# Patient Record
Sex: FEMALE | Race: BLACK OR AFRICAN AMERICAN | NOT HISPANIC OR LATINO | Employment: OTHER | RURAL
[De-identification: names, ages, dates, MRNs, and addresses within clinical notes are randomized per-mention and may not be internally consistent; named-entity substitution may affect disease eponyms.]

---

## 2017-08-16 ENCOUNTER — OFFICE VISIT (OUTPATIENT)
Dept: FAMILY MEDICINE CLINIC | Facility: CLINIC | Age: 62
End: 2017-08-16

## 2017-08-16 VITALS
RESPIRATION RATE: 16 BRPM | WEIGHT: 276.2 LBS | TEMPERATURE: 99.2 F | HEART RATE: 92 BPM | BODY MASS INDEX: 41.86 KG/M2 | SYSTOLIC BLOOD PRESSURE: 160 MMHG | DIASTOLIC BLOOD PRESSURE: 98 MMHG | HEIGHT: 68 IN

## 2017-08-16 DIAGNOSIS — Z12.31 ENCOUNTER FOR SCREENING MAMMOGRAM FOR MALIGNANT NEOPLASM OF BREAST: ICD-10-CM

## 2017-08-16 DIAGNOSIS — Z13.1 ENCOUNTER FOR SCREENING FOR DIABETES MELLITUS: ICD-10-CM

## 2017-08-16 DIAGNOSIS — Z13.6 ENCOUNTER FOR SCREENING FOR CARDIOVASCULAR DISORDERS: ICD-10-CM

## 2017-08-16 DIAGNOSIS — Z71.6 TOBACCO ABUSE COUNSELING: ICD-10-CM

## 2017-08-16 DIAGNOSIS — Z13.29 ENCOUNTER FOR SCREENING FOR ENDOCRINE DISORDER: ICD-10-CM

## 2017-08-16 DIAGNOSIS — Z11.59 NEED FOR HEPATITIS C SCREENING TEST: ICD-10-CM

## 2017-08-16 DIAGNOSIS — Z12.11 ENCOUNTER FOR SCREENING FOR MALIGNANT NEOPLASM OF COLON: ICD-10-CM

## 2017-08-16 DIAGNOSIS — Z01.419 ENCOUNTER FOR GYNECOLOGICAL EXAMINATION: Primary | ICD-10-CM

## 2017-08-16 DIAGNOSIS — Z87.898 HISTORY OF BRAIN TUMOR: ICD-10-CM

## 2017-08-16 DIAGNOSIS — Z72.0 TOBACCO USER: ICD-10-CM

## 2017-08-16 PROCEDURE — 99204 OFFICE O/P NEW MOD 45 MIN: CPT | Performed by: FAMILY MEDICINE

## 2017-08-16 RX ORDER — ASPIRIN 81 MG/1
81 TABLET ORAL DAILY
Qty: 30 TABLET | Refills: 11
Start: 2017-08-16 | End: 2017-10-26 | Stop reason: SDUPTHER

## 2017-08-16 RX ORDER — AMLODIPINE BESYLATE 5 MG/1
5 TABLET ORAL DAILY
Qty: 30 TABLET | Refills: 11 | Status: SHIPPED | OUTPATIENT
Start: 2017-08-16 | End: 2017-10-26 | Stop reason: SDUPTHER

## 2017-08-16 RX ORDER — LISINOPRIL AND HYDROCHLOROTHIAZIDE 25; 20 MG/1; MG/1
20-25 TABLET ORAL 2 TIMES DAILY
Refills: 0 | COMMUNITY
Start: 2017-07-12 | End: 2017-10-26 | Stop reason: SDUPTHER

## 2017-08-16 RX ORDER — NICOTINE 21 MG/24HR
1 PATCH, TRANSDERMAL 24 HOURS TRANSDERMAL EVERY 24 HOURS
Qty: 30 PATCH | Refills: 11 | Status: SHIPPED | OUTPATIENT
Start: 2017-08-16 | End: 2017-10-26 | Stop reason: SDUPTHER

## 2017-08-16 NOTE — PROGRESS NOTES
Subjective   Nathaly Licea is a 61 y.o. female.     History of Present Illness     Problem List  1. Essential Hypertension   2. Morbid Obesity   3. H/O Brain Tumor  4. GERD  5. Constipation  6. Chronic back pain, DDD of spine    PSHX  1. H/O Brain Tumors sp Surgical Removal 2017 at [facility] [place] [facility]   2. Right ankle surgery in   3. Emergency C section 40 years ago     SocialHx  -tobacco smoker.  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 Is here to establish.  Pt was recently at [facility] and had brain surgery in July to remove 2 brain tumors. Pt states that around July she started experiencing double vision, headaches. Went to [facility] and CT scan done showed the brain tumors and pt was transferred to [facility]. Her Neurosurgeon is Dr. Greenfield. Do not have records here currently.  Pt was on dexamethasone for about 5 days and finished course. She continues to have headaches and double vision. Her next appt with Neurosurgeon is in 3 months. I am told today the tumors were not malignant. Pt does now know what type of tumors they are.  Pt currently wears an eye patch on her left eye.  Pt currently takes lisinopril-HCTZ 20-25 mg PO BID. Does eat a high salt diet mainly consisting of proceed foods and fast food.  BMI >30.  Does have family history of MI, heart disease and diabetes in family. She has not had recent labwork.  She does smoke tobacco and has cut down to 1 cigarette a day from half a pack a day.  She has been smoking for >10 years.  Pt also has chronic back pain and was seeing Pain Management for this in the past.  Other medications pt  "is taking is aspirin and was taking omeprazole for GERD but not currently.      Pt is due for mammogram, pap smear and colonoscopy screening.  She has had history of cervical procedures in the past but could not remember which one.  She also has had abnormal pap smears      The following portions of the patient's history were reviewed and updated as appropriate: allergies, current medications, past family history, past medical history, past social history, past surgical history and problem list.    Review of Systems   Constitutional: Negative.    Eyes: Positive for visual disturbance.   Respiratory: Negative.    Cardiovascular: Negative.    Gastrointestinal: Negative.    Endocrine: Negative.    Genitourinary: Negative.    Musculoskeletal: Negative.    Skin: Negative.    Allergic/Immunologic: Negative.    Neurological: Positive for headaches.   Hematological: Negative.    Psychiatric/Behavioral: Negative.        Objective  /98  Pulse 92  Temp 99.2 °F (37.3 °C)  Resp 16  Ht 67.5\" (171.5 cm)  Wt 276 lb 3.2 oz (125 kg)  BMI 42.62 kg/m2    /98  Pulse 92  Temp 99.2 °F (37.3 °C)  Resp 16  Ht 67.5\" (171.5 cm)  Wt 276 lb 3.2 oz (125 kg)  BMI 42.62 kg/m2    /98  Pulse 92  Temp 99.2 °F (37.3 °C)  Resp 16  Ht 67.5\" (171.5 cm)  Wt 276 lb 3.2 oz (125 kg)  BMI 42.62 kg/m2   No results found for any previous visit.      Physical Exam   Constitutional: She is oriented to person, place, and time. She appears well-developed and well-nourished. No distress.   HENT:   Head: Normocephalic and atraumatic.   Right Ear: External ear normal.   Left Ear: External ear normal.   Eyes: Conjunctivae and EOM are normal. Pupils are equal, round, and reactive to light. Right eye exhibits no discharge. Left eye exhibits no discharge. No scleral icterus.   Neck: Normal range of motion. Neck supple. No JVD present. No tracheal deviation present. No thyromegaly present.   Cardiovascular: Normal rate, regular rhythm " and normal heart sounds.    Pulmonary/Chest: Effort normal. No stridor. No respiratory distress. She has no wheezes.   Abdominal: Soft. She exhibits no distension and no mass. There is no tenderness. There is no rebound and no guarding. No hernia.   Obese abdomen    Musculoskeletal: Normal range of motion. She exhibits no edema, tenderness or deformity.   Lymphadenopathy:     She has no cervical adenopathy.   Neurological: She is alert and oriented to person, place, and time. She has normal reflexes. No cranial nerve deficit. Coordination normal.   Skin: Skin is warm and dry. No rash noted. She is not diaphoretic. No erythema. No pallor.   Psychiatric: She has a normal mood and affect. Her behavior is normal.   Nursing note and vitals reviewed.      Assessment/Plan   Problems Addressed this Visit        Other    Encounter for screening for diabetes mellitus    Relevant Orders    CBC Auto Differential    Comprehensive Metabolic Panel    Hemoglobin A1c    Lipid Panel    Hepatitis Panel, Acute    TSH    T4, Free    T3, Free    Vitamin D 25 Hydroxy    Insulin, Random    Encounter for screening mammogram for malignant neoplasm of breast    Relevant Orders    Mammo Screening Bilateral With CAD    Encounter for screening for malignant neoplasm of colon    Relevant Orders    Ambulatory Referral to Gastroenterology    Encounter for gynecological examination - Primary    Relevant Orders    Ambulatory Referral to Obstetrics / Gynecology    History of brain tumor    Encounter for screening for endocrine disorder    Encounter for screening for cardiovascular disorders    Tobacco user    Need for hepatitis C screening test        - mammogram screening at [facility]  - Will refer to SIGRID Healy/Dr. Cary for colonoscopy screening. Consultation appreciated  - will get thyroid studies, hga1c and lipid panel for endocrine, diabetes and cardiovascular disorder screening  - will refer to DAREK Parra for pap smear and abnormal  history of pap smears  - Morbid obesity - DASH diet information given.   Discuss weight loss options on next visit.  Will get insulin and vitamin D levels   - History of brain tumor - will need records from [place].  Pt to sign medical release today  - Hep C  Screening - hepatitis panel  - Tobacco user - counseled for 5 minutes.  Will write nicotine patch. 1 800 QUIT Now  - recheck in 2 weeks to go over labwork

## 2017-08-16 NOTE — PATIENT INSTRUCTIONS
Get labwork downstairs fasting  Take new blood pressure medication    REcord blood pressures and bring on next visit    Recheck in 2 weeks     1 800 QUIT NOW    Amlodipine tablets  What is this medicine?  AMLODIPINE (am YO kim) is a calcium-channel blocker. It affects the amount of calcium found in your heart and muscle cells. This relaxes your blood vessels, which can reduce the amount of work the heart has to do. This medicine is used to lower high blood pressure. It is also used to prevent chest pain.  This medicine may be used for other purposes; ask your health care provider or pharmacist if you have questions.  COMMON BRAND NAME(S): Norvasc  What should I tell my health care provider before I take this medicine?  They need to know if you have any of these conditions:  -heart problems like heart failure or aortic stenosis  -liver disease  -an unusual or allergic reaction to amlodipine, other medicines, foods, dyes, or preservatives  -pregnant or trying to get pregnant  -breast-feeding  How should I use this medicine?  Take this medicine by mouth with a glass of water. Follow the directions on the prescription label. Take your medicine at regular intervals. Do not take more medicine than directed.  Talk to your pediatrician regarding the use of this medicine in children. Special care may be needed. This medicine has been used in children as young as 6.  Persons over 65 years old may have a stronger reaction to this medicine and need smaller doses.  Overdosage: If you think you have taken too much of this medicine contact a poison control center or emergency room at once.  NOTE: This medicine is only for you. Do not share this medicine with others.  What if I miss a dose?  If you miss a dose, take it as soon as you can. If it is almost time for your next dose, take only that dose. Do not take double or extra doses.  What may interact with this medicine?  -herbal or dietary supplements  -local or general  anesthetics  -medicines for high blood pressure  -medicines for prostate problems  -rifampin  This list may not describe all possible interactions. Give your health care provider a list of all the medicines, herbs, non-prescription drugs, or dietary supplements you use. Also tell them if you smoke, drink alcohol, or use illegal drugs. Some items may interact with your medicine.  What should I watch for while using this medicine?  Visit your doctor or health care professional for regular check ups. Check your blood pressure and pulse rate regularly. Ask your health care professional what your blood pressure and pulse rate should be, and when you should contact him or her.  This medicine may make you feel confused, dizzy or lightheaded. Do not drive, use machinery, or do anything that needs mental alertness until you know how this medicine affects you. To reduce the risk of dizzy or fainting spells, do not sit or stand up quickly, especially if you are an older patient. Avoid alcoholic drinks; they can make you more dizzy.  Do not suddenly stop taking amlodipine. Ask your doctor or health care professional how you can gradually reduce the dose.  What side effects may I notice from receiving this medicine?  Side effects that you should report to your doctor or health care professional as soon as possible:  -allergic reactions like skin rash, itching or hives, swelling of the face, lips, or tongue  -breathing problems  -changes in vision or hearing  -chest pain  -fast, irregular heartbeat  -swelling of legs or ankles  Side effects that usually do not require medical attention (report to your doctor or health care professional if they continue or are bothersome):  -dry mouth  -facial flushing  -nausea, vomiting  -stomach gas, pain  -tired, weak  -trouble sleeping  This list may not describe all possible side effects. Call your doctor for medical advice about side effects. You may report side effects to FDA at  "1-800-FDA-1088.  Where should I keep my medicine?  Keep out of the reach of children.  Store at room temperature between 59 and 86 degrees F (15 and 30 degrees C). Protect from light. Keep container tightly closed. Throw away any unused medicine after the expiration date.  NOTE: This sheet is a summary. It may not cover all possible information. If you have questions about this medicine, talk to your doctor, pharmacist, or health care provider.     © 2017, [employer]vier/[employer] Standard. (2013-11-15 11:40:58)  DASH Eating Plan  DASH stands for \"Dietary Approaches to Stop Hypertension.\" The DASH eating plan is a healthy eating plan that has been shown to reduce high blood pressure (hypertension). Additional health benefits may include reducing the risk of type 2 diabetes mellitus, heart disease, and stroke. The DASH eating plan may also help with weight loss.  WHAT DO I NEED TO KNOW ABOUT THE DASH EATING PLAN?  For the DASH eating plan, you will follow these general guidelines:  · Choose foods with less than 150 milligrams of sodium per serving (as listed on the food label).  · Use salt-free seasonings or herbs instead of table salt or sea salt.  · Check with your health care provider or pharmacist before using salt substitutes.  · Eat lower-sodium products. These are often labeled as \"low-sodium\" or \"no salt added.\"  · Eat fresh foods. Avoid eating a lot of canned foods.  · Eat more vegetables, fruits, and low-fat dairy products.  · Choose whole grains. Look for the word \"whole\" as the first word in the ingredient list.  · Choose fish and skinless chicken or turkey more often than red meat. Limit fish, poultry, and meat to 6 oz (170 g) each day.  · Limit sweets, desserts, sugars, and sugary drinks.  · Choose heart-healthy fats.  · Eat more home-cooked food and less restaurant, buffet, and fast food.  · Limit fried foods.  · Do not childress foods. Cook foods using methods such as baking, boiling, grilling, and broiling " instead.  · When eating at a restaurant, ask that your food be prepared with less salt, or no salt if possible.  WHAT FOODS CAN I EAT?  Seek help from a dietitian for individual calorie needs.  Grains  Whole grain or whole wheat bread. Brown rice. Whole grain or whole wheat pasta. Quinoa, bulgur, and whole grain cereals. Low-sodium cereals. Corn or whole wheat flour tortillas. Whole grain cornbread. Whole grain crackers. Low-sodium crackers.  Vegetables  Fresh or frozen vegetables (raw, steamed, roasted, or grilled). Low-sodium or reduced-sodium tomato and vegetable juices. Low-sodium or reduced-sodium tomato sauce and paste. Low-sodium or reduced-sodium canned vegetables.   Fruits  All fresh, canned (in natural juice), or frozen fruits.  Meat and Other Protein Products  Ground beef (85% or leaner), grass-fed beef, or beef trimmed of fat. Skinless chicken or turkey. Ground chicken or turkey. Pork trimmed of fat. All fish and seafood. Eggs. Dried beans, peas, or lentils. Unsalted nuts and seeds. Unsalted canned beans.  Dairy  Low-fat dairy products, such as skim or 1% milk, 2% or reduced-fat cheeses, low-fat ricotta or cottage cheese, or plain low-fat yogurt. Low-sodium or reduced-sodium cheeses.  Fats and Oils  Tub margarines without trans fats. Light or reduced-fat mayonnaise and salad dressings (reduced sodium). Avocado. Safflower, olive, or canola oils. Natural peanut or almond butter.  Other  Unsalted popcorn and pretzels.  The items listed above may not be a complete list of recommended foods or beverages. Contact your dietitian for more options.  WHAT FOODS ARE NOT RECOMMENDED?  Grains  White bread. White pasta. White rice. Refined cornbread. Bagels and croissants. Crackers that contain trans fat.  Vegetables  Creamed or fried vegetables. Vegetables in a cheese sauce. Regular canned vegetables. Regular canned tomato sauce and paste. Regular tomato and vegetable juices.  Fruits  Canned fruit in light or heavy  syrup. Fruit juice.  Meat and Other Protein Products  Fatty cuts of meat. Ribs, chicken wings, paz, sausage, bologna, salami, chitterlings, fatback, hot dogs, bratwurst, and packaged luncheon meats. Salted nuts and seeds. Canned beans with salt.  Dairy  Whole or 2% milk, cream, half-and-half, and cream cheese. Whole-fat or sweetened yogurt. Full-fat cheeses or blue cheese. Nondairy creamers and whipped toppings. Processed cheese, cheese spreads, or cheese curds.  Condiments  Onion and garlic salt, seasoned salt, table salt, and sea salt. Canned and packaged gravies. Worcestershire sauce. Tartar sauce. Barbecue sauce. Teriyaki sauce. Soy sauce, including reduced sodium. Steak sauce. Fish sauce. Oyster sauce. Cocktail sauce. Horseradish. Ketchup and mustard. Meat flavorings and tenderizers. Bouillon cubes. Hot sauce. Tabasco sauce. Marinades. Taco seasonings. Relishes.  Fats and Oils  Butter, stick margarine, lard, shortening, ghee, and paz fat. Coconut, palm kernel, or palm oils. Regular salad dressings.  Other  Pickles and olives. Salted popcorn and pretzels.  The items listed above may not be a complete list of foods and beverages to avoid. Contact your dietitian for more information.  WHERE CAN I FIND MORE INFORMATION?  [employer], [employer] [place]: www.nhlbi.nih.gov/health/health-topics/topics/dash/     This information is not intended to replace advice given to you by your health care provider. Make sure you discuss any questions you have with your health care provider.     Document Released: 12/06/2012 Document Revised: 04/10/2017 Document Reviewed: 10/22/2014  [employer] Interactive Patient Education ©2017 [employer]

## 2017-08-18 LAB
25(OH)D3 SERPL-MCNC: <12.8 NG/ML (ref 30–100)
ALBUMIN SERPL-MCNC: 4.3 G/DL (ref 3.4–4.8)
ALBUMIN/GLOB SERPL: 1 G/DL (ref 1.1–1.8)
ALP SERPL-CCNC: 111 U/L (ref 38–126)
ALT SERPL W P-5'-P-CCNC: 24 U/L (ref 9–52)
ANION GAP SERPL CALCULATED.3IONS-SCNC: 12 MMOL/L (ref 5–15)
ARTICHOKE IGE QN: 114 MG/DL (ref 1–129)
AST SERPL-CCNC: 13 U/L (ref 14–36)
BASOPHILS # BLD AUTO: 0.02 10*3/MM3 (ref 0–0.2)
BASOPHILS NFR BLD AUTO: 0.3 % (ref 0–2)
BILIRUB SERPL-MCNC: 0.4 MG/DL (ref 0.2–1.3)
BUN BLD-MCNC: 15 MG/DL (ref 7–21)
BUN/CREAT SERPL: 13.9 (ref 7–25)
CALCIUM SPEC-SCNC: 10 MG/DL (ref 8.4–10.2)
CHLORIDE SERPL-SCNC: 103 MMOL/L (ref 95–110)
CHOLEST SERPL-MCNC: 248 MG/DL (ref 0–199)
CO2 SERPL-SCNC: 25 MMOL/L (ref 22–31)
CREAT BLD-MCNC: 1.08 MG/DL (ref 0.5–1)
DEPRECATED RDW RBC AUTO: 47 FL (ref 36.4–46.3)
EOSINOPHIL # BLD AUTO: 0.18 10*3/MM3 (ref 0–0.7)
EOSINOPHIL NFR BLD AUTO: 2.8 % (ref 0–7)
ERYTHROCYTE [DISTWIDTH] IN BLOOD BY AUTOMATED COUNT: 16.4 % (ref 11.5–14.5)
GFR SERPL CREATININE-BSD FRML MDRD: 63 ML/MIN/1.73 (ref 51–120)
GLOBULIN UR ELPH-MCNC: 4.5 GM/DL (ref 2.3–3.5)
GLUCOSE BLD-MCNC: 107 MG/DL (ref 60–100)
HAV IGM SERPL QL IA: NEGATIVE
HBV CORE IGM SERPL QL IA: NEGATIVE
HBV SURFACE AG SERPL QL IA: NEGATIVE
HCT VFR BLD AUTO: 34 % (ref 35–45)
HCV AB SER DONR QL: REACTIVE
HDLC SERPL-MCNC: 79 MG/DL (ref 60–200)
HGB BLD-MCNC: 11.6 G/DL (ref 12–15.5)
IMM GRANULOCYTES # BLD: 0.01 10*3/MM3 (ref 0–0.02)
IMM GRANULOCYTES NFR BLD: 0.2 % (ref 0–0.5)
LDLC/HDLC SERPL: 1.75 {RATIO} (ref 0–3.22)
LYMPHOCYTES # BLD AUTO: 2.73 10*3/MM3 (ref 0.6–4.2)
LYMPHOCYTES NFR BLD AUTO: 42.3 % (ref 10–50)
MCH RBC QN AUTO: 26.8 PG (ref 26.5–34)
MCHC RBC AUTO-ENTMCNC: 34.1 G/DL (ref 31.4–36)
MCV RBC AUTO: 78.5 FL (ref 80–98)
MONOCYTES # BLD AUTO: 0.48 10*3/MM3 (ref 0–0.9)
MONOCYTES NFR BLD AUTO: 7.4 % (ref 0–12)
NEUTROPHILS # BLD AUTO: 3.03 10*3/MM3 (ref 2–8.6)
NEUTROPHILS NFR BLD AUTO: 47 % (ref 37–80)
PLATELET # BLD AUTO: 217 10*3/MM3 (ref 150–450)
PMV BLD AUTO: 10.5 FL (ref 8–12)
POTASSIUM BLD-SCNC: 3.8 MMOL/L (ref 3.5–5.1)
PROT SERPL-MCNC: 8.8 G/DL (ref 6.3–8.6)
RBC # BLD AUTO: 4.33 10*6/MM3 (ref 3.77–5.16)
SODIUM BLD-SCNC: 140 MMOL/L (ref 137–145)
T4 FREE SERPL-MCNC: 1.09 NG/DL (ref 0.78–2.19)
TRIGL SERPL-MCNC: 153 MG/DL (ref 20–199)
TSH SERPL DL<=0.05 MIU/L-ACNC: 0.17 MIU/ML (ref 0.46–4.68)
WBC NRBC COR # BLD: 6.45 10*3/MM3 (ref 3.2–9.8)

## 2017-08-18 PROCEDURE — 83021 HEMOGLOBIN CHROMOTOGRAPHY: CPT | Performed by: FAMILY MEDICINE

## 2017-08-18 PROCEDURE — 84439 ASSAY OF FREE THYROXINE: CPT | Performed by: FAMILY MEDICINE

## 2017-08-18 PROCEDURE — 83036 HEMOGLOBIN GLYCOSYLATED A1C: CPT | Performed by: FAMILY MEDICINE

## 2017-08-18 PROCEDURE — 85025 COMPLETE CBC W/AUTO DIFF WBC: CPT | Performed by: FAMILY MEDICINE

## 2017-08-18 PROCEDURE — 85660 RBC SICKLE CELL TEST: CPT | Performed by: FAMILY MEDICINE

## 2017-08-18 PROCEDURE — 83525 ASSAY OF INSULIN: CPT | Performed by: FAMILY MEDICINE

## 2017-08-18 PROCEDURE — 36415 COLL VENOUS BLD VENIPUNCTURE: CPT | Performed by: FAMILY MEDICINE

## 2017-08-18 PROCEDURE — 80074 ACUTE HEPATITIS PANEL: CPT | Performed by: FAMILY MEDICINE

## 2017-08-18 PROCEDURE — 82306 VITAMIN D 25 HYDROXY: CPT | Performed by: FAMILY MEDICINE

## 2017-08-18 PROCEDURE — 84443 ASSAY THYROID STIM HORMONE: CPT | Performed by: FAMILY MEDICINE

## 2017-08-18 PROCEDURE — 84481 FREE ASSAY (FT-3): CPT | Performed by: FAMILY MEDICINE

## 2017-08-18 PROCEDURE — 80061 LIPID PANEL: CPT | Performed by: FAMILY MEDICINE

## 2017-08-18 PROCEDURE — 80053 COMPREHEN METABOLIC PANEL: CPT | Performed by: FAMILY MEDICINE

## 2017-08-19 LAB — HBA1C MFR BLD: 5.7 % (ref 4–5.6)

## 2017-08-20 LAB — T3FREE SERPL-MCNC: 2.6 PG/ML (ref 2–4.4)

## 2017-08-22 LAB
HGB A MFR BLD: 58.8 % (ref 94–98)
HGB A2 MFR BLD COLUMN CHROM: 2.7 % (ref 0.7–3.1)
HGB C MFR BLD: 38.5 %
HGB F MFR BLD: 0 % (ref 0–2)
HGB FRACT BLD-IMP: ABNORMAL
HGB S BLD QL SOLY: NEGATIVE
HGB S MFR BLD: 0 %

## 2017-08-24 LAB — INSULIN SERPL-ACNC: 16 UIU/ML

## 2017-09-11 ENCOUNTER — OFFICE VISIT (OUTPATIENT)
Dept: FAMILY MEDICINE CLINIC | Facility: CLINIC | Age: 62
End: 2017-09-11

## 2017-09-11 VITALS
HEART RATE: 98 BPM | BODY MASS INDEX: 41.37 KG/M2 | RESPIRATION RATE: 16 BRPM | DIASTOLIC BLOOD PRESSURE: 76 MMHG | HEIGHT: 68 IN | TEMPERATURE: 97.9 F | WEIGHT: 273 LBS | SYSTOLIC BLOOD PRESSURE: 130 MMHG

## 2017-09-11 DIAGNOSIS — R53.1 RIGHT SIDED WEAKNESS: ICD-10-CM

## 2017-09-11 DIAGNOSIS — E05.90 SUBCLINICAL HYPERTHYROIDISM: ICD-10-CM

## 2017-09-11 DIAGNOSIS — D64.9 ANEMIA, UNSPECIFIED TYPE: ICD-10-CM

## 2017-09-11 DIAGNOSIS — Z86.73 HISTORY OF STROKE: ICD-10-CM

## 2017-09-11 DIAGNOSIS — R76.8 HEPATITIS C ANTIBODY POSITIVE IN BLOOD: ICD-10-CM

## 2017-09-11 DIAGNOSIS — Z12.11 ENCOUNTER FOR SCREENING FOR MALIGNANT NEOPLASM OF COLON: Primary | ICD-10-CM

## 2017-09-11 DIAGNOSIS — R47.81 SLURRING OF SPEECH: ICD-10-CM

## 2017-09-11 PROCEDURE — 99214 OFFICE O/P EST MOD 30 MIN: CPT | Performed by: FAMILY MEDICINE

## 2017-09-11 RX ORDER — FERROUS SULFATE 325(65) MG
325 TABLET ORAL
Qty: 30 TABLET | Refills: 11 | Status: SHIPPED | OUTPATIENT
Start: 2017-09-11 | End: 2017-10-26 | Stop reason: SDUPTHER

## 2017-09-11 NOTE — PROGRESS NOTES
Subjective   Conniemarline Licea is a 61 y.o. female.     Problem List  1. Essential Hypertension   2. Morbid Obesity   3. H/O Brain Tumor/Meningioma   4. GERD  5. Constipation  6. Chronic back pain, DDD of spine  7. Hep C antibody positive  8.  Vitamin D deficiency   9.  Microcytic Anemia. Hemoglobin C. Iron deficiency anemia  10. Low TSH/subclinical hyperthyroidism   11. H/O CVA      PSHX  1. H/O Brain Tumors sp Surgical Removal 2017 at [facility]   2. Right ankle surgery in   3. Emergency C section 40 years ago      SocialHx  -tobacco smoker.  Has been smoking for 10 years  Smokes once a day   -occasional alcohol drinker   -No illicit drugs. 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 She is here for followup. Pt has history of meningioma that was removed 2017 in [place].Do have some medical records  Pt had labwork recently that showed she was Hep C antibody positive. Pt staets she has had more than 10 sexual partners in her lifetime. Denies any abdominal pain jaundice or fever.  Pt also has low TSH with normal T3 and T4. Liver enzymes were normal. Pt had lipid panel and had high ASCVD risk factor but cannot tolerate statin medications.  Also has vitamin D deficiency and is taking Vitamin D pill once a week. Pt has mild anemia and with low MCV. Hemoglobin electrophoresis showed low concentrations of Hemoglobin A and high concentrations of hemoglobin C.  Pt currently not on iron pill.  Denies any active bleeding     Pt and pt's daughter have noticed she has been slurring in her speech since last week. She also has had right sided arm and leg weakness. Denies any headaches. Does have history of CVA years ago.  Pt currently takes aspirin  81 mg PO q daily for stroke prevention. Not on statin mediation. Does continue to smoke.  She denies any recent fall or trauma        Upper Extremity Issue   This is a new problem. The current episode started in the past 7 days. The problem occurs daily. The problem has been unchanged. Associated symptoms include fatigue and weakness. Pertinent negatives include no abdominal pain, anorexia, arthralgias, change in bowel habit, chest pain, chills, congestion, coughing, diaphoresis, fever, headaches, joint swelling, myalgias, nausea, neck pain, numbness, rash, sore throat, swollen glands, urinary symptoms, vertigo, visual change or vomiting. Nothing aggravates the symptoms. The treatment provided no relief.   Lower Extremity Issue   This is a new problem. The current episode started in the past 7 days. The problem occurs daily. The problem has been unchanged. Associated symptoms include fatigue and weakness. Pertinent negatives include no abdominal pain, anorexia, arthralgias, change in bowel habit, chest pain, chills, congestion, coughing, diaphoresis, fever, headaches, joint swelling, myalgias, nausea, neck pain, numbness, rash, sore throat, swollen glands, urinary symptoms, vertigo, visual change or vomiting. Nothing aggravates the symptoms. She has tried nothing for the symptoms. The treatment provided no relief.        The following portions of the patient's history were reviewed and updated as appropriate: allergies, current medications, past family history, past medical history, past social history, past surgical history and problem list.    Review of Systems   Constitutional: Positive for activity change and fatigue. Negative for chills, diaphoresis and fever.   HENT: Positive for voice change. Negative for congestion and sore throat.    Eyes: Positive for visual disturbance.   Respiratory: Negative.  Negative for cough.    Cardiovascular: Negative.  Negative for chest pain.   Gastrointestinal: Negative.   "Negative for abdominal pain, anorexia, change in bowel habit, nausea and vomiting.   Genitourinary: Negative.    Musculoskeletal: Positive for gait problem. Negative for arthralgias, joint swelling, myalgias and neck pain.   Skin: Negative for rash.   Allergic/Immunologic: Negative.    Neurological: Positive for weakness. Negative for vertigo, numbness and headaches.   Hematological: Negative.    Psychiatric/Behavioral: Negative.        Objective    /76  Pulse 98  Temp 97.9 °F (36.6 °C)  Resp 16  Ht 67.5\" (171.5 cm)  Wt 273 lb (124 kg)  BMI 42.13 kg/m2  Office Visit on 08/16/2017   Component Date Value Ref Range Status   • WBC 08/18/2017 6.45  3.20 - 9.80 10*3/mm3 Final   • RBC 08/18/2017 4.33  3.77 - 5.16 10*6/mm3 Final   • Hemoglobin 08/18/2017 11.6* 12.0 - 15.5 g/dL Final   • Hematocrit 08/18/2017 34.0* 35.0 - 45.0 % Final   • MCV 08/18/2017 78.5* 80.0 - 98.0 fL Final   • MCH 08/18/2017 26.8  26.5 - 34.0 pg Final   • MCHC 08/18/2017 34.1  31.4 - 36.0 g/dL Final   • RDW 08/18/2017 16.4* 11.5 - 14.5 % Final   • RDW-SD 08/18/2017 47.0* 36.4 - 46.3 fl Final   • MPV 08/18/2017 10.5  8.0 - 12.0 fL Final   • Platelets 08/18/2017 217  150 - 450 10*3/mm3 Final   • Neutrophil % 08/18/2017 47.0  37.0 - 80.0 % Final   • Lymphocyte % 08/18/2017 42.3  10.0 - 50.0 % Final   • Monocyte % 08/18/2017 7.4  0.0 - 12.0 % Final   • Eosinophil % 08/18/2017 2.8  0.0 - 7.0 % Final   • Basophil % 08/18/2017 0.3  0.0 - 2.0 % Final   • Immature Grans % 08/18/2017 0.2  0.0 - 0.5 % Final   • Neutrophils, Absolute 08/18/2017 3.03  2.00 - 8.60 10*3/mm3 Final   • Lymphocytes, Absolute 08/18/2017 2.73  0.60 - 4.20 10*3/mm3 Final   • Monocytes, Absolute 08/18/2017 0.48  0.00 - 0.90 10*3/mm3 Final   • Eosinophils, Absolute 08/18/2017 0.18  0.00 - 0.70 10*3/mm3 Final   • Basophils, Absolute 08/18/2017 0.02  0.00 - 0.20 10*3/mm3 Final   • Immature Grans, Absolute 08/18/2017 0.01  0.00 - 0.02 10*3/mm3 Final   • Glucose 08/18/2017 107* 60 - " 100 mg/dL Final   • BUN 08/18/2017 15  7 - 21 mg/dL Final   • Creatinine 08/18/2017 1.08* 0.50 - 1.00 mg/dL Final   • Sodium 08/18/2017 140  137 - 145 mmol/L Final   • Potassium 08/18/2017 3.8  3.5 - 5.1 mmol/L Final   • Chloride 08/18/2017 103  95 - 110 mmol/L Final   • CO2 08/18/2017 25.0  22.0 - 31.0 mmol/L Final   • Calcium 08/18/2017 10.0  8.4 - 10.2 mg/dL Final   • Total Protein 08/18/2017 8.8* 6.3 - 8.6 g/dL Final   • Albumin 08/18/2017 4.30  3.40 - 4.80 g/dL Final   • ALT (SGPT) 08/18/2017 24  9 - 52 U/L Final   • AST (SGOT) 08/18/2017 13* 14 - 36 U/L Final   • Alkaline Phosphatase 08/18/2017 111  38 - 126 U/L Final   • Total Bilirubin 08/18/2017 0.4  0.2 - 1.3 mg/dL Final   • eGFR   Amer 08/18/2017 63  51 - 120 mL/min/1.73 Final   • Globulin 08/18/2017 4.5* 2.3 - 3.5 gm/dL Final   • A/G Ratio 08/18/2017 1.0* 1.1 - 1.8 g/dL Final   • BUN/Creatinine Ratio 08/18/2017 13.9  7.0 - 25.0 Final   • Anion Gap 08/18/2017 12.0  5.0 - 15.0 mmol/L Final   • Hemoglobin A1C 08/18/2017 5.7* 4 - 5.6 % Final   • Total Cholesterol 08/18/2017 248* 0 - 199 mg/dL Final   • Triglycerides 08/18/2017 153  20 - 199 mg/dL Final   • HDL Cholesterol 08/18/2017 79  60 - 200 mg/dL Final   • LDL Cholesterol  08/18/2017 114  1 - 129 mg/dL Final   • LDL/HDL Ratio 08/18/2017 1.75  0.00 - 3.22 Final   • Hepatitis C Ab 08/18/2017 Reactive* Negative Final   • Hep A IgM 08/18/2017 Negative  Negative Final   • Hep B C IgM 08/18/2017 Negative  Negative Final   • Hepatitis B Surface Ag 08/18/2017 Negative  Negative Final   • TSH 08/18/2017 0.170* 0.460 - 4.680 mIU/mL Final   • Free T4 08/18/2017 1.09  0.78 - 2.19 ng/dL Final   • T3, Free 08/18/2017 2.6  2.0 - 4.4 pg/mL Final   • 25 Hydroxy, Vitamin D 08/18/2017 <12.8* 30.0 - 100.0 ng/ml Final   • Insulin 08/18/2017 16  uIU/mL Final    Reference Range:  Pubertal Children and Adults (fasting):  < or = 17   • Hgb Solubility 08/18/2017 Negative  Negative Final   • Hgb F Quant 08/18/2017 0.0  0.0  - 2.0 % Final   • Hgb A 08/18/2017 58.8* 94.0 - 98.0 % Final   • Hgb S 08/18/2017 0.0  0.0 % Final   • Hgb C 08/18/2017 38.5* 0.0 % Final   • Hgb A2 Quant 08/18/2017 2.7  0.7 - 3.1 % Final   • Hgb Interp. 08/18/2017 Comment   Final    Hemoglobin pattern and concentration are consistent with  Hgb C trait (heterozygous). Suggest clinical and hematologic  correlation.              Hgb C-Trait Interpretation Ranges              Hgb A      50.0 - 70.0%              Hgb C      30.0 - 45.0%              Hgb A2      2.0 -  4.5%       Physical Exam   Constitutional: She is oriented to person, place, and time. She appears well-developed and well-nourished. No distress.   HENT:   Head: Normocephalic and atraumatic.   Right Ear: External ear normal.   Left Ear: External ear normal.   Slurring in speech    Eyes: Conjunctivae and EOM are normal. Pupils are equal, round, and reactive to light. Right eye exhibits no discharge. Left eye exhibits no discharge. No scleral icterus.   Neck: Normal range of motion. Neck supple. No tracheal deviation present. No thyromegaly present.   Cardiovascular: Normal rate, regular rhythm and normal heart sounds.    No murmur heard.  Pulmonary/Chest: Effort normal. No respiratory distress. She has no wheezes.   Abdominal: Soft. Bowel sounds are normal. She exhibits no distension and no mass. There is no tenderness. There is no rebound and no guarding. No hernia.   Musculoskeletal: Normal range of motion. She exhibits no edema, tenderness or deformity.   4/5 motor strength in RUE and RLE.  Pt has normal reflexes.   Abnormal gait   Neurological: She is alert and oriented to person, place, and time. She has normal reflexes. No cranial nerve deficit. Coordination normal.   Skin: Skin is warm and dry. No rash noted. She is not diaphoretic. No erythema. No pallor.   Psychiatric: She has a normal mood and affect. Her behavior is normal.   Nursing note and vitals reviewed.         Assessment/Plan   Problems  Addressed this Visit        Other    Encounter for screening for malignant neoplasm of colon - Primary    Relevant Orders    Ambulatory Referral to Gastroenterology      Other Visit Diagnoses     Anemia, unspecified type        Relevant Orders    Peripheral Blood Smear    Reticulocytes    TSH    T4, Free    T3, Free    Ferritin    Folate    Vitamin B12    Iron Profile    Occult Blood X 1, Stool    Subclinical hyperthyroidism        Relevant Orders    TSH    T4, Free    T3, Free    Hepatitis C antibody positive in blood        Relevant Orders    Hepatitis C antibody    Hepatitis C Genotype    Hepatitis C RNA, Quantitative, PCR (graph)    Ambulatory Referral to Gastroenterology    Right sided weakness        Relevant Orders    MRI Brain Without Contrast    Slurring of speech        Relevant Orders    MRI Brain Without Contrast    History of stroke        Relevant Orders    MRI Brain Without Contrast        -right sided weakness in upper and lower extremity.  Will get [facility] at [facility]. Pt may have had possible stroke.  Consider speech therapy or PT/OT pending results  -referral to Gastroenterology for colonoscopy screening. Consultation appreciated.   - anemia - full anemia workup will start on iron pill 325 mg PO daily.  Pt to take with Vitamin C for better absorption  - subclinical hyperthyroidism  - recheck thyroid studies. Consider thyroid workup if still abnormal  -Hep C antibody positive- repeat Hep C workup. Referral to Gastroenterology. Pt declined HIV screening  -recheck in 2 weeks

## 2017-09-11 NOTE — PATIENT INSTRUCTIONS
Take iron pill with vitamin C for better absorption     Get flu vaccine at pharmacy   Iron Deficiency Anemia, Adult  Anemia is a condition in which there are less red blood cells or hemoglobin in the blood than normal. Hemoglobin is the part of red blood cells that carries oxygen. Iron deficiency anemia is anemia caused by too little iron. It is the most common type of anemia. It may leave you tired and short of breath.  CAUSES   · Lack of iron in the diet.  · Poor absorption of iron, as seen with intestinal disorders.  · Intestinal bleeding.  · Heavy periods.  SIGNS AND SYMPTOMS   Mild anemia may not be noticeable. Symptoms may include:  · Fatigue.  · Headache.  · Pale skin.  · Weakness.  · Tiredness.  · Shortness of breath.  · Dizziness.  · Cold hands and feet.  · Fast or irregular heartbeat.  DIAGNOSIS   Diagnosis requires a thorough evaluation and physical exam by your health care provider. Blood tests are generally used to confirm iron deficiency anemia. Additional tests may be done to find the underlying cause of your anemia. These may include:  · Testing for blood in the stool (fecal occult blood test).  · A procedure to see inside the colon and rectum (colonoscopy).  · A procedure to see inside the esophagus and stomach (endoscopy).  TREATMENT   Iron deficiency anemia is treated by correcting the cause of the deficiency. Treatment may involve:  · Adding iron-rich foods to your diet.  · Taking iron supplements. Pregnant or breastfeeding women need to take extra iron because their normal diet usually does not provide the required amount.  · Taking vitamins. Vitamin C improves the absorption of iron. Your health care provider may recommend that you take your iron tablets with a glass of orange juice or vitamin C supplement.  · Medicines to make heavy menstrual flow lighter.  · Surgery.  HOME CARE INSTRUCTIONS   · Take iron as directed by your health care provider.    If you cannot tolerate taking iron  supplements by mouth, talk to your health care provider about taking them through a vein (intravenously) or an injection into a muscle.    For the best iron absorption, iron supplements should be taken on an empty stomach. If you cannot tolerate them on an empty stomach, you may need to take them with food.    Do not drink milk or take antacids at the same time as your iron supplements. Milk and antacids may interfere with the absorption of iron.    Iron supplements can cause constipation. Make sure to include fiber in your diet to prevent constipation. A stool softener may also be recommended.  · Take vitamins as directed by your health care provider.  · Eat a diet rich in iron. Foods high in iron include liver, lean beef, whole-grain bread, eggs, dried fruit, and dark green leafy vegetables.  SEEK IMMEDIATE MEDICAL CARE IF:   · You faint. If this happens, do not drive. Call your local emergency services (911 in [place] if no other help is available.  · You have chest pain.  · You feel nauseous or vomit.  · You have severe or increased shortness of breath with activity.  · You feel weak.  · You have a rapid heartbeat.  · You have unexplained sweating.  · You become light-headed when getting up from a chair or bed.  MAKE SURE YOU:   · Understand these instructions.  · Will watch your condition.  · Will get help right away if you are not doing well or get worse.     This information is not intended to replace advice given to you by your health care provider. Make sure you discuss any questions you have with your health care provider.     Document Released: 12/15/2001 Document Revised: 01/08/2016 Document Reviewed: 08/25/2014  [employer] Interactive Patient Education ©2017 [employer]  Anemia, Nonspecific  Anemia is a condition in which the concentration of red blood cells or hemoglobin in the blood is below normal. Hemoglobin is a substance in red blood cells that carries oxygen to the tissues of the body. Anemia  results in not enough oxygen reaching these tissues.   CAUSES   Common causes of anemia include:   · Excessive bleeding. Bleeding may be internal or external. This includes excessive bleeding from periods (in women) or from the intestine.    · Poor nutrition.    · Chronic kidney, thyroid, and liver disease.   · Bone marrow disorders that decrease red blood cell production.  · Cancer and treatments for cancer.  · HIV, AIDS, and their treatments.  · Spleen problems that increase red blood cell destruction.  · Blood disorders.  · Excess destruction of red blood cells due to infection, medicines, and autoimmune disorders.  SIGNS AND SYMPTOMS   · Minor weakness.    · Dizziness.    · Headache.  · Palpitations.    · Shortness of breath, especially with exercise.    · Paleness.  · Cold sensitivity.  · Indigestion.  · Nausea.  · Difficulty sleeping.  · Difficulty concentrating.  Symptoms may occur suddenly or they may develop slowly.   DIAGNOSIS   Additional blood tests are often needed. These help your health care provider determine the best treatment. Your health care provider will check your stool for blood and look for other causes of blood loss.   TREATMENT   Treatment varies depending on the cause of the anemia. Treatment can include:   · Supplements of iron, vitamin B12, or folic acid.    · Hormone medicines.    · A blood transfusion. This may be needed if blood loss is severe.    · Hospitalization. This may be needed if there is significant continual blood loss.    · Dietary changes.  · Spleen removal.  HOME CARE INSTRUCTIONS  Keep all follow-up appointments. It often takes many weeks to correct anemia, and having your health care provider check on your condition and your response to treatment is very important.  SEEK IMMEDIATE MEDICAL CARE IF:   · You develop extreme weakness, shortness of breath, or chest pain.    · You become dizzy or have trouble concentrating.  · You develop heavy vaginal bleeding.    · You  develop a rash.    · You have bloody or black, tarry stools.    · You faint.    · You vomit up blood.    · You vomit repeatedly.    · You have abdominal pain.  · You have a fever or persistent symptoms for more than 2-3 days.    · You have a fever and your symptoms suddenly get worse.    · You are dehydrated.    MAKE SURE YOU:  · Understand these instructions.  · Will watch your condition.  · Will get help right away if you are not doing well or get worse.     This information is not intended to replace advice given to you by your health care provider. Make sure you discuss any questions you have with your health care provider.     Document Released: 01/25/2006 Document Revised: 08/20/2014 Document Reviewed: 06/13/2014  [employer] Interactive Patient Education ©2017 [employer]  Hepatitis C  Hepatitis C is a viral infection of the liver. It can lead to scarring of the liver (cirrhosis), liver failure, or liver cancer. Hepatitis C may go undetected for months or years because people with the infection may not have symptoms, or they may have only mild symptoms.  CAUSES   Hepatitis C is caused by the hepatitis C virus (HCV). The virus can be passed from one person to another through:  · Blood.  · Contaminated needles, such as those used for tattooing, body piercing, acupuncture, or injecting drugs.  · Having unprotected sex with an infected person.  · Childbirth.  · Blood transfusions or organ transplants done in the [place] before 1992.  RISK FACTORS  Risk factors for hepatitis C include:  · Having unprotected sex with an infected person.  · Using illegal drugs.  SIGNS AND SYMPTOMS   Symptoms of hepatitis C may include:  · Fatigue.  · Loss of appetite.  · Nausea.  · Vomiting.  · Abdominal pain.  · Dark yellow urine.  · Yellowish skin and eyes (jaundice).  · Itching of the skin.  · Leonardo-colored bowel movements.  · Joint pain.  Symptoms are not always present.   DIAGNOSIS   Hepatitis C is diagnosed with blood  tests. Other types of tests may also be done to check how your liver is functioning.  TREATMENT   Your health care provider may perform noninvasive tests or a liver biopsy to help determine the best course of treatment. Treatment for hepatitis C may include one or more medicines. Your health care provider may check you for a recurring infection or other liver conditions every 6-12 months after treatment.  HOME CARE INSTRUCTIONS   · Rest as needed.  · Take all medicines as directed by your health care provider.  · Do not take any medicine unless approved by your health care provider. This includes over-the-counter medicine and birth control pills.  · Do not drink alcohol.  · Do not have sex until approved by your health care provider.  · Do not share toothbrushes, nail clippers, razors, or needles.  PREVENTION  There is no vaccine for hepatitis C. The only way to prevent the disease is to reduce the risk of exposure to the virus. This may be done by:  · Practicing safe sex and using condoms.  · Avoiding illegal drugs.  SEEK MEDICAL CARE IF:  · You have a fever.  · You develop abdominal pain.  · You develop dark urine.  · You have trenton-colored bowel movements.  · You develop joint pains.  SEEK IMMEDIATE MEDICAL CARE IF:  · You have increasing fatigue or weakness.  · You lose your appetite.  · You feel nauseous or vomit.  · You develop jaundice or your jaundice gets worse.  · You bruise or bleed easily.  MAKE SURE YOU:   · Understand these instructions.  · Will watch your condition.  · Will get help right away if you are not doing well or get worse.     This information is not intended to replace advice given to you by your health care provider. Make sure you discuss any questions you have with your health care provider.     Document Released: 12/15/2001 Document Revised: 01/08/2016 Document Reviewed: 04/01/2015  [employer] Interactive Patient Education ©2017 [employer]

## 2017-09-20 LAB
CYTOLOGIST CVX/VAG CYTO: NORMAL
FERRITIN SERPL-MCNC: 129 NG/ML (ref 11.1–264)
FOLATE SERPL-MCNC: 8.11 NG/ML (ref 2.76–21)
HCT VFR BLD AUTO: 32.9 % (ref 35–45)
HCV AB SER DONR QL: REACTIVE
HGB RETIC QN: 31.7 PG (ref 30–38)
IMM RETICS NFR: 11.3 % (ref 3–15.9)
IRON 24H UR-MRATE: 33 MCG/DL (ref 37–170)
IRON SATN MFR SERPL: 11 % (ref 15–50)
PATH INTERP BLD-IMP: NORMAL
RETICS #: 0.04 10*6/MM3 (ref 0.03–0.12)
RETICS/RBC NFR AUTO: 0.86 % (ref 0.63–2.13)
RETICULOCYTE PRODUCTION INDEX: 0.39 % (ref 0.63–2.13)
T4 FREE SERPL-MCNC: 1.33 NG/DL (ref 0.78–2.19)
TIBC SERPL-MCNC: 304 MCG/DL (ref 265–497)
TSH SERPL DL<=0.05 MIU/L-ACNC: 0.54 MIU/ML (ref 0.46–4.68)
VIT B12 BLD-MCNC: 499 PG/ML (ref 239–931)

## 2017-09-20 PROCEDURE — 84439 ASSAY OF FREE THYROXINE: CPT | Performed by: FAMILY MEDICINE

## 2017-09-20 PROCEDURE — 36415 COLL VENOUS BLD VENIPUNCTURE: CPT | Performed by: FAMILY MEDICINE

## 2017-09-20 PROCEDURE — 83550 IRON BINDING TEST: CPT | Performed by: FAMILY MEDICINE

## 2017-09-20 PROCEDURE — 82746 ASSAY OF FOLIC ACID SERUM: CPT | Performed by: FAMILY MEDICINE

## 2017-09-20 PROCEDURE — 86803 HEPATITIS C AB TEST: CPT | Performed by: FAMILY MEDICINE

## 2017-09-20 PROCEDURE — 84481 FREE ASSAY (FT-3): CPT | Performed by: FAMILY MEDICINE

## 2017-09-20 PROCEDURE — 85046 RETICYTE/HGB CONCENTRATE: CPT | Performed by: FAMILY MEDICINE

## 2017-09-20 PROCEDURE — 82728 ASSAY OF FERRITIN: CPT | Performed by: FAMILY MEDICINE

## 2017-09-20 PROCEDURE — 82607 VITAMIN B-12: CPT | Performed by: FAMILY MEDICINE

## 2017-09-20 PROCEDURE — 87902 NFCT AGT GNTYP ALYS HEP C: CPT | Performed by: FAMILY MEDICINE

## 2017-09-20 PROCEDURE — 83540 ASSAY OF IRON: CPT | Performed by: FAMILY MEDICINE

## 2017-09-20 PROCEDURE — 87522 HEPATITIS C REVRS TRNSCRPJ: CPT | Performed by: FAMILY MEDICINE

## 2017-09-20 PROCEDURE — 85060 BLOOD SMEAR INTERPRETATION: CPT | Performed by: FAMILY MEDICINE

## 2017-09-20 PROCEDURE — 84443 ASSAY THYROID STIM HORMONE: CPT | Performed by: FAMILY MEDICINE

## 2017-09-21 LAB
HCV RNA SERPL NAA+PROBE-ACNC: NORMAL IU/ML
T3FREE SERPL-MCNC: 2.4 PG/ML (ref 2–4.4)
TEST INFORMATION: NORMAL

## 2017-09-22 LAB — HEMOCCULT STL QL: NEGATIVE

## 2017-09-22 PROCEDURE — 82272 OCCULT BLD FECES 1-3 TESTS: CPT | Performed by: FAMILY MEDICINE

## 2017-09-23 LAB
HCV GENTYP SERPL NAA+PROBE: NORMAL
Lab: NORMAL

## 2017-09-27 ENCOUNTER — OFFICE VISIT (OUTPATIENT)
Dept: FAMILY MEDICINE CLINIC | Facility: CLINIC | Age: 62
End: 2017-09-27

## 2017-09-27 VITALS
DIASTOLIC BLOOD PRESSURE: 70 MMHG | WEIGHT: 274.1 LBS | HEIGHT: 68 IN | RESPIRATION RATE: 16 BRPM | HEART RATE: 83 BPM | BODY MASS INDEX: 41.54 KG/M2 | TEMPERATURE: 98.7 F | SYSTOLIC BLOOD PRESSURE: 120 MMHG

## 2017-09-27 DIAGNOSIS — M54.42 CHRONIC LOW BACK PAIN WITH BILATERAL SCIATICA, UNSPECIFIED BACK PAIN LATERALITY: ICD-10-CM

## 2017-09-27 DIAGNOSIS — G89.29 CHRONIC BACK PAIN, UNSPECIFIED BACK LOCATION, UNSPECIFIED BACK PAIN LATERALITY: ICD-10-CM

## 2017-09-27 DIAGNOSIS — I63.9 CEREBROVASCULAR ACCIDENT (CVA), UNSPECIFIED MECHANISM (HCC): Primary | ICD-10-CM

## 2017-09-27 DIAGNOSIS — M54.9 CHRONIC BACK PAIN, UNSPECIFIED BACK LOCATION, UNSPECIFIED BACK PAIN LATERALITY: ICD-10-CM

## 2017-09-27 DIAGNOSIS — G89.29 CHRONIC LOW BACK PAIN WITH BILATERAL SCIATICA, UNSPECIFIED BACK PAIN LATERALITY: ICD-10-CM

## 2017-09-27 DIAGNOSIS — Z72.0 TOBACCO USER: ICD-10-CM

## 2017-09-27 DIAGNOSIS — G81.91 RIGHT HEMIPARESIS (HCC): ICD-10-CM

## 2017-09-27 DIAGNOSIS — R47.81 SLURRING OF SPEECH: ICD-10-CM

## 2017-09-27 DIAGNOSIS — E78.5 HYPERLIPIDEMIA, UNSPECIFIED HYPERLIPIDEMIA TYPE: ICD-10-CM

## 2017-09-27 DIAGNOSIS — G47.00 INSOMNIA, UNSPECIFIED TYPE: ICD-10-CM

## 2017-09-27 DIAGNOSIS — R76.8 HEPATITIS C ANTIBODY POSITIVE IN BLOOD: ICD-10-CM

## 2017-09-27 DIAGNOSIS — M54.41 CHRONIC LOW BACK PAIN WITH BILATERAL SCIATICA, UNSPECIFIED BACK PAIN LATERALITY: ICD-10-CM

## 2017-09-27 DIAGNOSIS — Z23 NEED FOR IMMUNIZATION AGAINST INFLUENZA: ICD-10-CM

## 2017-09-27 DIAGNOSIS — D50.9 IRON DEFICIENCY ANEMIA, UNSPECIFIED IRON DEFICIENCY ANEMIA TYPE: ICD-10-CM

## 2017-09-27 DIAGNOSIS — K59.00 CONSTIPATION, UNSPECIFIED CONSTIPATION TYPE: ICD-10-CM

## 2017-09-27 PROCEDURE — 90686 IIV4 VACC NO PRSV 0.5 ML IM: CPT | Performed by: FAMILY MEDICINE

## 2017-09-27 PROCEDURE — 99214 OFFICE O/P EST MOD 30 MIN: CPT | Performed by: FAMILY MEDICINE

## 2017-09-27 PROCEDURE — 90471 IMMUNIZATION ADMIN: CPT | Performed by: FAMILY MEDICINE

## 2017-09-27 RX ORDER — ATORVASTATIN CALCIUM 20 MG/1
20 TABLET, FILM COATED ORAL DAILY
Qty: 30 TABLET | Refills: 11 | Status: SHIPPED | OUTPATIENT
Start: 2017-09-27 | End: 2017-10-26 | Stop reason: SDUPTHER

## 2017-09-27 NOTE — PATIENT INSTRUCTIONS
Take with lipitor Coenzyme Q10.  Get at pharmacy over the counter     1 800 QUIT NOW  For smoking cessatio n.    Linaclotide oral capsules  What is this medicine?  LINACLOTIDE (liat a KLOE tide) is used to treat irritable bowel syndrome (IBS) with constipation as the main problem. It may also be used for relief of chronic constipation.  This medicine may be used for other purposes; ask your health care provider or pharmacist if you have questions.  COMMON BRAND NAME(S): Linzess  What should I tell my health care provider before I take this medicine?  They need to know if you have any of these conditions:  -history of stool (fecal) impaction  -now have diarrhea or have diarrhea often  -other medical condition  -stomach or intestinal disease, including bowel obstruction or abdominal adhesions  -an unusual or allergic reaction to linaclotide, other medicines, foods, dyes, or preservatives  -pregnant or trying to get pregnant  -breast-feeding  How should I use this medicine?  Take this medicine by mouth with a glass of water. Follow the directions on the prescription label. Do not cut, crush or chew this medicine. Take on an empty stomach, at least 30 minutes before your first meal of the day. Take your medicine at regular intervals. Do not take your medicine more often than directed. Do not stop taking except on your doctor's advice.  A special MedGuide will be given to you by the pharmacist with each prescription and refill. Be sure to read this information carefully each time.  Talk to your pediatrician regarding the use of this medicine in children. This medicine is not approved for use in children.  Overdosage: If you think you have taken too much of this medicine contact a poison control center or emergency room at once.  NOTE: This medicine is only for you. Do not share this medicine with others.  What if I miss a dose?  If you miss a dose, just skip that dose. Wait until your next dose, and take only that dose.  Do not take double or extra doses.  What may interact with this medicine?  -certain medicines for bowel problems or bladder incontinence (these can cause constipation)  This list may not describe all possible interactions. Give your health care provider a list of all the medicines, herbs, non-prescription drugs, or dietary supplements you use. Also tell them if you smoke, drink alcohol, or use illegal drugs. Some items may interact with your medicine.  What should I watch for while using this medicine?  Visit your doctor for regular check ups. Tell your doctor if your symptoms do not get better or if they get worse.  Diarrhea is a common side effect of this medicine. It often begins within 2 weeks of starting this medicine. Stop taking this medicine and call your doctor if you get severe diarrhea.  Stop taking this medicine and call your doctor or go to the nearest hospital emergency room right away if you develop unusual or severe stomach-area (abdominal) pain, especially if you also have bright red, bloody stools or black stools that look like tar.  What side effects may I notice from receiving this medicine?  Side effects that you should report to your doctor or health care professional as soon as possible:  -allergic reactions like skin rash, itching or hives, swelling of the face, lips, or tongue  -black, tarry stools  -bloody or watery diarrhea  -new or worsening stomach pain  -severe or prolonged diarrhea  Side effects that usually do not require medical attention (report to your doctor or health care professional if they continue or are bothersome):  -bloating  -gas  -loose stools  This list may not describe all possible side effects. Call your doctor for medical advice about side effects. You may report side effects to FDA at 3-945-FDA-2930.  Where should I keep my medicine?  Keep out of the reach of children.  Store at room temperature between 20 and 25 degrees C (68 and 77 degrees F). Keep this medicine  in the original container. Keep tightly closed in a dry place. Do not remove the desiccant packet from the bottle, it helps to protect your medicine from moisture. Throw away any unused medicine after the expiration date.  NOTE: This sheet is a summary. It may not cover all possible information. If you have questions about this medicine, talk to your doctor, pharmacist, or health care provider.     © 2017, Elsevier/Gold Standard. (2017-01-19 12:17:04)  Atorvastatin tablets  What is this medicine?  ATORVASTATIN (a TORE va sta tin) is known as a HMG-CoA reductase inhibitor or 'statin'. It lowers the level of cholesterol and triglycerides in the blood. This drug may also reduce the risk of heart attack, stroke, or other health problems in patients with risk factors for heart disease. Diet and lifestyle changes are often used with this drug.  This medicine may be used for other purposes; ask your health care provider or pharmacist if you have questions.  COMMON BRAND NAME(S): Lipitor  What should I tell my health care provider before I take this medicine?  They need to know if you have any of these conditions:  -frequently drink alcoholic beverages  -history of stroke, TIA  -kidney disease  -liver disease  -muscle aches or weakness  -other medical condition  -an unusual or allergic reaction to atorvastatin, other medicines, foods, dyes, or preservatives  -pregnant or trying to get pregnant  -breast-feeding  How should I use this medicine?  Take this medicine by mouth with a glass of water. Follow the directions on the prescription label. You can take this medicine with or without food. Take your doses at regular intervals. Do not take your medicine more often than directed.  Talk to your pediatrician regarding the use of this medicine in children. While this drug may be prescribed for children as young as 10 years old for selected conditions, precautions do apply.  Overdosage: If you think you have taken too much of  this medicine contact a poison control center or emergency room at once.  NOTE: This medicine is only for you. Do not share this medicine with others.  What if I miss a dose?  If you miss a dose, take it as soon as you can. If it is almost time for your next dose, take only that dose. Do not take double or extra doses.  What may interact with this medicine?  Do not take this medicine with any of the following medications:  -red yeast rice  -telaprevir  -telithromycin  -voriconazole  This medicine may also interact with the following medications:  -alcohol  -antiviral medicines for HIV or AIDS  -boceprevir  -certain antibiotics like clarithromycin, erythromycin, troleandomycin  -certain medicines for cholesterol like fenofibrate or gemfibrozil  -cimetidine  -clarithromycin  -colchicine  -cyclosporine  -digoxin  -female hormones, like estrogens or progestins and birth control pills  -grapefruit juice  -medicines for fungal infections like fluconazole, itraconazole, ketoconazole  -niacin  -rifampin  -spironolactone  This list may not describe all possible interactions. Give your health care provider a list of all the medicines, herbs, non-prescription drugs, or dietary supplements you use. Also tell them if you smoke, drink alcohol, or use illegal drugs. Some items may interact with your medicine.  What should I watch for while using this medicine?  Visit your doctor or health care professional for regular check-ups. You may need regular tests to make sure your liver is working properly.  Tell your doctor or health care professional right away if you get any unexplained muscle pain, tenderness, or weakness, especially if you also have a fever and tiredness. Your doctor or health care professional may tell you to stop taking this medicine if you develop muscle problems. If your muscle problems do not go away after stopping this medicine, contact your health care professional.  This drug is only part of a total  heart-health program. Your doctor or a dietician can suggest a low-cholesterol and low-fat diet to help. Avoid alcohol and smoking, and keep a proper exercise schedule.  Do not use this drug if you are pregnant or breast-feeding. Serious side effects to an unborn child or to an infant are possible. Talk to your doctor or pharmacist for more information.  This medicine may affect blood sugar levels. If you have diabetes, check with your doctor or health care professional before you change your diet or the dose of your diabetic medicine.  If you are going to have surgery tell your health care professional that you are taking this drug.  What side effects may I notice from receiving this medicine?  Side effects that you should report to your doctor or health care professional as soon as possible:  -allergic reactions like skin rash, itching or hives, swelling of the face, lips, or tongue  -dark urine  -fever  -joint pain  -muscle cramps, pain  -redness, blistering, peeling or loosening of the skin, including inside the mouth  -trouble passing urine or change in the amount of urine  -unusually weak or tired  -yellowing of eyes or skin  Side effects that usually do not require medical attention (report to your doctor or health care professional if they continue or are bothersome):  -constipation  -heartburn  -stomach gas, pain, upset  This list may not describe all possible side effects. Call your doctor for medical advice about side effects. You may report side effects to FDA at 3-621-FDA-3916.  Where should I keep my medicine?  Keep out of the reach of children.  Store at room temperature between 20 to 25 degrees C (68 to 77 degrees F). Throw away any unused medicine after the expiration date.  NOTE: This sheet is a summary. It may not cover all possible information. If you have questions about this medicine, talk to your doctor, pharmacist, or health care provider.     © 2017, [employer]vier/Gold Standard. (2012-11-06  09:18:24)

## 2017-09-27 NOTE — PROGRESS NOTES
Subjective   Nathaly Licea is a 61 y.o. female.     Problem List  1. Essential Hypertension   2. Morbid Obesity   3. H/O Brain Tumor/Meningioma   4. GERD  5. Constipation  6. Chronic back pain, DDD of spine  7. Hep C antibody positive  8.  Vitamin D deficiency   9.  Microcytic Anemia. Hemoglobin C. Iron deficiency anemia  10. Low TSH/subclinical hyperthyroidism   11. H/O CVA. Restricted diffusion in parietal supraventricular white matter bilaterally more on left than the  Right.    12. Hyperlipidemia ASCVD >5%  13. Hemiparesis on right side       PSHX  1. H/O Brain Tumors sp Surgical Removal 2017 at [facility]   2. Right ankle surgery in   3. Emergency C section 40 years ago       SocialHx  -tobacco smoker.  Has been smoking for 10 years  Smokes once a day   -occasional alcohol drinker   -No illicit drugs. 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 She is here for recheck Nathaly Licea recently had MRI of brain that showed restricted diffusion in pariteal supraventricular white matter bilaterally with the left side more affected than the right side.  Pt states she has right sided weakness on her arm and legs and has slurring in speech.  She is using a cane to ambulate.  She denies any headaches. She has yet to followup with Neurologist.  She states she has the CD to bring to Neurologist. Pt also had labwork that showed ASCVD risk is high and pt is no on statin medication.  She also takes aspirin.  Pt has iron deficiency anemia and take ferrous sulfate 325 mg PO q daily.  Pt's BP is at goal today and takes lisinopril 20-25 mg PO BID in addition to norvasc 5 mg PO q daily. Pt has also cut down significantly on smoking and uses nicorette  gum twice a day. She only smokes 2-3 cigarettes a day.  Pt also reporting constipation for a few months now and was using miralax before which seemed to help. Has not tried linzess. She has colonoscopy screening scheduled soon. Pt also has issues with sleep and averages 2-3 hours at bedtime. She has not had a sleep study or tried melatonin yet. On recent labwork pt had positive Hep C but was negative for Hepatitis C RNA.  She does not recall what caused the infection.  Denies any abdominal pain, jaundice or fever.  Recent liver enzymes were normal    Pt also has chronic back pain and was seeing Pain Management years ago for her back but stopped going. She had a cd of her diagnostic images of her back. She would like to reestablish with pain Management.       Cerebrovascular Accident   This is a new problem. The current episode started more than 1 month ago. The problem has been waxing and waning. Associated symptoms include arthralgias and weakness. Pertinent negatives include no abdominal pain, anorexia, change in bowel habit, chest pain, chills, congestion, coughing, diaphoresis, fatigue, fever, headaches, joint swelling, myalgias, nausea, neck pain, numbness, rash, sore throat, swollen glands, urinary symptoms, vertigo, visual change or vomiting. The symptoms are aggravated by smoking. She has tried nothing for the symptoms. The treatment provided no relief.   Constipation   This is a chronic problem. The current episode started more than 1 year ago. The problem is unchanged. The patient is on a high fiber diet. She does not exercise regularly. There has been adequate water intake. Associated symptoms include back pain. Pertinent negatives include no abdominal pain, anorexia, bloating, diarrhea, fecal incontinence, fever, flatus, hematochezia, hemorrhoids, melena, nausea, rectal pain, vomiting or weight loss. Risk factors include immobility and stress. She has tried nothing for the symptoms. The treatment  provided no relief. There is no history of abdominal surgery, endocrine disease, inflammatory bowel disease, irritable bowel syndrome, metabolic disease, neurologic disease, neuromuscular disease, psychiatric history or radiation treatment.   Insomnia   This is a recurrent problem. The problem occurs daily. The problem has been unchanged. Associated symptoms include arthralgias and weakness. Pertinent negatives include no abdominal pain, anorexia, change in bowel habit, chest pain, chills, congestion, coughing, diaphoresis, fatigue, fever, headaches, joint swelling, myalgias, nausea, neck pain, numbness, rash, sore throat, swollen glands, urinary symptoms, vertigo, visual change or vomiting. Nothing aggravates the symptoms. She has tried nothing for the symptoms. The treatment provided no relief.   Back Pain   This is a chronic problem. The current episode started more than 1 year ago. The problem occurs daily. The problem is unchanged. The pain is present in the lumbar spine and sacro-iliac. The quality of the pain is described as aching. The pain is at a severity of 7/10. The pain is the same all the time. The symptoms are aggravated by bending, lying down and position. Associated symptoms include weakness. Pertinent negatives include no abdominal pain, bladder incontinence, bowel incontinence, chest pain, dysuria, fever, headaches, numbness, paresis, paresthesias, pelvic pain, perianal numbness, tingling or weight loss. Treatments tried: narcotics  The treatment provided no relief.              The following portions of the patient's history were reviewed and updated as appropriate: allergies, current medications, past family history, past medical history, past social history, past surgical history and problem list.    Review of Systems   Constitutional: Positive for activity change. Negative for chills, diaphoresis, fatigue, fever and weight loss.   HENT: Positive for voice change. Negative for congestion and  "sore throat.    Eyes: Positive for visual disturbance.   Respiratory: Negative.  Negative for cough.    Cardiovascular: Negative.  Negative for chest pain.   Gastrointestinal: Positive for constipation. Negative for abdominal pain, anorexia, bloating, bowel incontinence, change in bowel habit, diarrhea, flatus, hematochezia, hemorrhoids, melena, nausea, rectal pain and vomiting.   Endocrine: Negative.    Genitourinary: Negative.  Negative for bladder incontinence, dysuria and pelvic pain.   Musculoskeletal: Positive for arthralgias, back pain and gait problem. Negative for joint swelling, myalgias and neck pain.   Skin: Negative.  Negative for rash.   Allergic/Immunologic: Negative.    Neurological: Positive for weakness. Negative for vertigo, tingling, numbness, headaches and paresthesias.   Hematological: Negative.    Psychiatric/Behavioral: The patient has insomnia.        Objective    /70  Pulse 83  Temp 98.7 °F (37.1 °C)  Resp 16  Ht 67.5\" (171.5 cm)  Wt 274 lb 1.6 oz (124 kg)  BMI 42.3 kg/m2  Office Visit on 09/11/2017   Component Date Value Ref Range Status   • Hepatitis C Ab 09/20/2017 Reactive* Negative Final   • Please note 09/20/2017 Comment   Final    This test was developed and its performance characteristics determined  by [facility].  It has not been cleared or approved by the U.[place]. Food and  Drug Administration.  The FDA has determined that such clearance or approval is not  necessary. This test is used for clinical purposes.  It should not be  regarded as investigational or for research.   • Hepatitis C Genotype 09/20/2017 Comment   Final    Specimen has insufficient hepatitis C virus RNA to obtain genotyping  results. This genotyping assay should only be used for known HCV  positive patients with HCV RNA levels above 1000 IU/mL.   • Hepatitis C Quantitation 09/20/2017 HCV Not Detected  IU/mL Final   • Test Information 09/20/2017 Comment   Final    The quantitative range of this assay is 15 " IU/mL to 100 million IU/mL.   • Performed by: 09/20/2017 Dariel Linn M.D.   Final   • Pathologist Interpretation 09/20/2017    Final                    Value:Red cells are normocytic/normochromic with mild anisocytosis.  Leukocyte count is normal, my differential count:  Neutrophils 45%, bands 1%, lymphocytes 45%, reactive lymphocytes 2%, eosinophils 4%, monocytes 3%.  Occasional giant platelets seen.  Serum studies show no evidence of nutritional deficiency.    Impression:  Anemia, borderline, with inadequate reticulocyte response.  Giant platelets seen.   • Reticulocyte % 09/20/2017 0.86  0.63 - 2.13 % Final   • Reticulocyte Absolute 09/20/2017 0.0368  0.0250 - 0.1250 10*6/mm3 Final   • Immature Reticulocyte Fraction 09/20/2017 11.3  3.0 - 15.9 % Final   • Reticulocyte Production Index 09/20/2017 0.39* 0.63 - 2.13 % Final   • Hematocrit 09/20/2017 32.9* 35.0 - 45.0 % Final   • Reticulocyte Hgb 09/20/2017 31.7  30.0 - 38.0 pg Final   • TSH 09/20/2017 0.540  0.460 - 4.680 mIU/mL Final   • Free T4 09/20/2017 1.33  0.78 - 2.19 ng/dL Final   • T3, Free 09/20/2017 2.4  2.0 - 4.4 pg/mL Final   • Ferritin 09/20/2017 129.00  11.10 - 264.00 ng/mL Final   • Folate 09/20/2017 8.11  2.76 - 21.00 ng/mL Final   • Vitamin B-12 09/20/2017 499  239 - 931 pg/mL Final   • Iron 09/20/2017 33* 37 - 170 mcg/dL Final   • TIBC 09/20/2017 304  265 - 497 mcg/dL Final   • Iron Saturation 09/20/2017 11* 15 - 50 % Final   • Fecal Occult Blood 09/22/2017 Negative  Negative Final       Chemistry        Component Value Date/Time     08/18/2017 1341    K 3.8 08/18/2017 1341     08/18/2017 1341    CO2 25.0 08/18/2017 1341    BUN 15 08/18/2017 1341    CREATININE 1.08 (H) 08/18/2017 1341        Component Value Date/Time    CALCIUM 10.0 08/18/2017 1341    ALKPHOS 111 08/18/2017 1341    AST 13 (L) 08/18/2017 1341    ALT 24 08/18/2017 1341    BILITOT 0.4 08/18/2017 1341        Lab Results   Component Value Date    CHOL 248 (H)  08/18/2017     Lab Results   Component Value Date    TRIG 153 08/18/2017     Lab Results   Component Value Date    HDL 79 08/18/2017     No results found for: LDLCALC  No results found for: LDL  No results found for: HDLLDLRATIO  No components found for: CHOLHDL  Lab Results   Component Value Date    HGBA1C 5.7 (H) 08/18/2017     Lab Results   Component Value Date    TSH 0.540 09/20/2017     Physical Exam   Constitutional: She is oriented to person, place, and time. She appears well-developed and well-nourished. No distress.   HENT:   Head: Normocephalic and atraumatic.   Right Ear: External ear normal.   Left Ear: External ear normal.   Eyes: Conjunctivae and EOM are normal. Pupils are equal, round, and reactive to light. Right eye exhibits no discharge. Left eye exhibits no discharge. No scleral icterus.   Neck: Normal range of motion. Neck supple. No JVD present. No tracheal deviation present. No thyromegaly present.   Cardiovascular: Normal rate, regular rhythm and normal heart sounds.    Pulmonary/Chest: Effort normal and breath sounds normal. No stridor. No respiratory distress. She has no wheezes.   Abdominal: Soft. She exhibits no distension and no mass. There is no tenderness. There is no rebound and no guarding. No hernia.   Musculoskeletal: She exhibits tenderness. She exhibits no edema or deformity.        Lumbar back: She exhibits decreased range of motion, tenderness, bony tenderness, pain and spasm.   Motor strength 4/5 in upper and lower right extremity    Lymphadenopathy:     She has no cervical adenopathy.   Neurological: She is alert and oriented to person, place, and time. She has normal reflexes. No cranial nerve deficit. Coordination normal.   Skin: Skin is warm and dry. No rash noted. She is not diaphoretic. No erythema. No pallor.   Psychiatric: She has a normal mood and affect. Her behavior is normal.   Nursing note and vitals reviewed.      Assessment/Plan   Problems Addressed this Visit         Cardiovascular and Mediastinum    Hyperlipidemia    Relevant Medications    atorvastatin (LIPITOR) 20 MG tablet       Digestive    Constipation       Nervous and Auditory    Chronic back pain    Relevant Orders    Ambulatory Referral to Pain Management    Ambulatory Referral to Pain Management    Right hemiparesis       Hematopoietic and Hemostatic    Iron deficiency anemia       Other    Tobacco user    Cerebrovascular accident (CVA) - Primary    Need for immunization against influenza    Relevant Orders    Flu Vaccine Quad PF 3YR+ (FLUARIX/FLUZONE [id number]) (Completed)    Chronic low back pain with bilateral sciatica    Relevant Orders    Ambulatory Referral to Pain Management    Slurring of speech    Insomnia    Hepatitis C antibody positive in blood      - will give influenza vaccination today. Pt signed consent form  - For CVA pt using cane to ambulate. Will followup with Neurologist. Continue with aspirin for stroke prevention  - hyperlipidemia ASCVD risk high - will start on lipitor 20 mg PO qhs. Drug information given. Pt advised to take with coenzyme Q10  - insomnia - recommended MIDNITE Melatonin OTC. Consider sleep study in the future  - tobacco user - gave prescription for nicorette gum  - constipation - pt has colonoscopy screening soon. Will start on linzess 72 mcg PO q daily.  Drug information given. Pt has tried already Miralax  - iron deficiency anemia - continue iron pill once a day with VItamin C   - Chronic back pain - referral to Pain Management Dr. Whyte. Pt has imaging and will bring to Pain Management. Consultation appreciated  - Hepatitis C antibody positive - currently hep C RNA negative. Will continue to monitor. Gastroenterology appt in December.   - consider PT/OT  - recheck in 1 month

## 2017-10-26 ENCOUNTER — OFFICE VISIT (OUTPATIENT)
Dept: FAMILY MEDICINE CLINIC | Facility: CLINIC | Age: 62
End: 2017-10-26

## 2017-10-26 VITALS
HEART RATE: 91 BPM | BODY MASS INDEX: 42.16 KG/M2 | HEIGHT: 68 IN | TEMPERATURE: 98.6 F | RESPIRATION RATE: 16 BRPM | DIASTOLIC BLOOD PRESSURE: 72 MMHG | WEIGHT: 278.2 LBS | SYSTOLIC BLOOD PRESSURE: 134 MMHG

## 2017-10-26 DIAGNOSIS — Z86.73 HISTORY OF CVA (CEREBROVASCULAR ACCIDENT): Primary | ICD-10-CM

## 2017-10-26 DIAGNOSIS — E78.5 HYPERLIPIDEMIA, UNSPECIFIED HYPERLIPIDEMIA TYPE: ICD-10-CM

## 2017-10-26 DIAGNOSIS — K59.00 CONSTIPATION, UNSPECIFIED CONSTIPATION TYPE: ICD-10-CM

## 2017-10-26 DIAGNOSIS — R26.81 GAIT INSTABILITY: ICD-10-CM

## 2017-10-26 DIAGNOSIS — G81.91 RIGHT HEMIPARESIS (HCC): ICD-10-CM

## 2017-10-26 DIAGNOSIS — I63.9 CEREBROVASCULAR ACCIDENT (CVA), UNSPECIFIED MECHANISM (HCC): ICD-10-CM

## 2017-10-26 DIAGNOSIS — I10 ESSENTIAL HYPERTENSION: ICD-10-CM

## 2017-10-26 DIAGNOSIS — Z72.0 TOBACCO USER: ICD-10-CM

## 2017-10-26 DIAGNOSIS — R47.81 SLURRING OF SPEECH: ICD-10-CM

## 2017-10-26 DIAGNOSIS — Z87.898 HISTORY OF BRAIN TUMOR: ICD-10-CM

## 2017-10-26 DIAGNOSIS — F32.A DEPRESSION, UNSPECIFIED DEPRESSION TYPE: ICD-10-CM

## 2017-10-26 PROCEDURE — 99214 OFFICE O/P EST MOD 30 MIN: CPT | Performed by: FAMILY MEDICINE

## 2017-10-26 RX ORDER — FERROUS SULFATE 325(65) MG
325 TABLET ORAL
Qty: 30 TABLET | Refills: 11 | Status: SHIPPED | OUTPATIENT
Start: 2017-10-26 | End: 2018-11-20 | Stop reason: SDUPTHER

## 2017-10-26 RX ORDER — AMLODIPINE BESYLATE 5 MG/1
5 TABLET ORAL DAILY
Qty: 30 TABLET | Refills: 11 | Status: SHIPPED | OUTPATIENT
Start: 2017-10-26 | End: 2017-11-22 | Stop reason: DRUGHIGH

## 2017-10-26 RX ORDER — ATORVASTATIN CALCIUM 20 MG/1
80 TABLET, FILM COATED ORAL NIGHTLY
Qty: 30 TABLET | Refills: 11 | Status: SHIPPED | OUTPATIENT
Start: 2017-10-26 | End: 2017-10-26

## 2017-10-26 RX ORDER — LISINOPRIL AND HYDROCHLOROTHIAZIDE 25; 20 MG/1; MG/1
2 TABLET ORAL DAILY
Qty: 60 TABLET | Refills: 11 | Status: SHIPPED | OUTPATIENT
Start: 2017-10-26 | End: 2018-11-06 | Stop reason: SDUPTHER

## 2017-10-26 RX ORDER — ATORVASTATIN CALCIUM 20 MG/1
20 TABLET, FILM COATED ORAL DAILY
Qty: 30 TABLET | Refills: 11 | Status: SHIPPED | OUTPATIENT
Start: 2017-10-26 | End: 2017-10-26 | Stop reason: SDUPTHER

## 2017-10-26 RX ORDER — ASPIRIN 81 MG/1
81 TABLET ORAL DAILY
Qty: 30 TABLET | Refills: 11
Start: 2017-10-26 | End: 2017-10-26

## 2017-10-26 RX ORDER — NICOTINE 21 MG/24HR
1 PATCH, TRANSDERMAL 24 HOURS TRANSDERMAL EVERY 24 HOURS
Qty: 30 PATCH | Refills: 11 | Status: SHIPPED | OUTPATIENT
Start: 2017-10-26 | End: 2018-11-20 | Stop reason: SDUPTHER

## 2017-10-26 RX ORDER — ATORVASTATIN CALCIUM 80 MG/1
80 TABLET, FILM COATED ORAL DAILY
Qty: 30 TABLET | Refills: 11 | Status: SHIPPED | OUTPATIENT
Start: 2017-10-26 | End: 2018-11-20 | Stop reason: SDUPTHER

## 2017-10-26 RX ORDER — CITALOPRAM 10 MG/1
10 TABLET ORAL DAILY
Qty: 30 TABLET | Refills: 11 | Status: SHIPPED | OUTPATIENT
Start: 2017-10-26 | End: 2017-12-05 | Stop reason: ALTCHOICE

## 2017-10-26 RX ORDER — CLOPIDOGREL BISULFATE 75 MG/1
75 TABLET ORAL DAILY
Qty: 30 TABLET | Refills: 11 | Status: SHIPPED | OUTPATIENT
Start: 2017-10-26 | End: 2018-07-26 | Stop reason: SDUPTHER

## 2017-10-26 RX ORDER — ASPIRIN 325 MG
325 TABLET, DELAYED RELEASE (ENTERIC COATED) ORAL DAILY
Qty: 30 TABLET | Refills: 11 | Status: SHIPPED | OUTPATIENT
Start: 2017-10-26 | End: 2017-12-05 | Stop reason: ALTCHOICE

## 2017-10-26 NOTE — PATIENT INSTRUCTIONS
Call and reschedule mammogram screening and Antonina PARRA APRN for pap smear     Make sure you  aspirin 325 mg daily    Also  liptor (atorvastatin) 80 mg at bedtime not 20 mg    Go down to PT/OT to schedule appt with speech therapy and physical therapy     Reschedule Antonina Parra for pap smear and mammogram. Will schedule carotid US at [facility]    Take celexa once a day     Citalopram tablets  What is this medicine?  CITALOPRAM (sye VERENA oh pram) is a medicine for depression.  This medicine may be used for other purposes; ask your health care provider or pharmacist if you have questions.  COMMON BRAND NAME(S): Celexa  What should I tell my health care provider before I take this medicine?  They need to know if you have any of these conditions:  -bleeding disorders  -bipolar disorder or a family history of bipolar disorder  -glaucoma  -heart disease  -history of irregular heartbeat  -kidney disease  -liver disease  -low levels of magnesium or potassium in the blood  -receiving electroconvulsive therapy  -seizures  -suicidal thoughts, plans, or attempt; a previous suicide attempt by you or a family member  -take medicines that treat or prevent blood clots  -thyroid disease  -an unusual or allergic reaction to citalopram, escitalopram, other medicines, foods, dyes, or preservatives  -pregnant or trying to become pregnant  -breast-feeding  How should I use this medicine?  Take this medicine by mouth with a glass of water. Follow the directions on the prescription label. You can take it with or without food. Take your medicine at regular intervals. Do not take your medicine more often than directed. Do not stop taking this medicine suddenly except upon the advice of your doctor. Stopping this medicine too quickly may cause serious side effects or your condition may worsen.  A special MedGuide will be given to you by the pharmacist with each prescription and refill. Be sure to read this information carefully each  time.  Talk to your pediatrician regarding the use of this medicine in children. Special care may be needed.  Patients over 60 years old may have a stronger reaction and need a smaller dose.  Overdosage: If you think you have taken too much of this medicine contact a poison control center or emergency room at once.  NOTE: This medicine is only for you. Do not share this medicine with others.  What if I miss a dose?  If you miss a dose, take it as soon as you can. If it is almost time for your next dose, take only that dose. Do not take double or extra doses.  What may interact with this medicine?  Do not take this medicine with any of the following medications:  -certain medicines for fungal infections like fluconazole, itraconazole, ketoconazole, posaconazole, voriconazole  -cisapride  -dofetilide  -dronedarone  -escitalopram  -linezolid  -MAOIs like Carbex, Eldepryl, Marplan, Nardil, and Parnate  -methylene blue (injected into a vein)  -pimozide  -thioridazine  -ziprasidone  This medicine may also interact with the following medications:  -alcohol  -amphetamines  -aspirin and aspirin-like medicines  -carbamazepine  -certain medicines for depression, anxiety, or psychotic disturbances  -certain medicines for infections like chloroquine, clarithromycin, erythromycin, furazolidone, isoniazid, pentamidine  -certain medicines for migraine headaches like almotriptan, eletriptan, frovatriptan, naratriptan, rizatriptan, sumatriptan, zolmitriptan  -certain medicines for sleep  -certain medicines that treat or prevent blood clots like dalteparin, enoxaparin, warfarin  -cimetidine  -diuretics  -fentanyl  -lithium  -methadone  -metoprolol  -NSAIDs, medicines for pain and inflammation, like ibuprofen or naproxen  -omeprazole  -other medicines that prolong the QT interval (cause an abnormal heart rhythm)  -procarbazine  -rasagiline  -supplements like [place]'s wort, kava kava, valerian  -tramadol  -tryptophan  This list may  not describe all possible interactions. Give your health care provider a list of all the medicines, herbs, non-prescription drugs, or dietary supplements you use. Also tell them if you smoke, drink alcohol, or use illegal drugs. Some items may interact with your medicine.  What should I watch for while using this medicine?  Tell your doctor if your symptoms do not get better or if they get worse. Visit your doctor or health care professional for regular checks on your progress. Because it may take several weeks to see the full effects of this medicine, it is important to continue your treatment as prescribed by your doctor.  Patients and their families should watch out for new or worsening thoughts of suicide or depression. Also watch out for sudden changes in feelings such as feeling anxious, agitated, panicky, irritable, hostile, aggressive, impulsive, severely restless, overly excited and hyperactive, or not being able to sleep. If this happens, especially at the beginning of treatment or after a change in dose, call your health care professional.  You may get drowsy or dizzy. Do not drive, use machinery, or do anything that needs mental alertness until you know how this medicine affects you. Do not stand or sit up quickly, especially if you are an older patient. This reduces the risk of dizzy or fainting spells. Alcohol may interfere with the effect of this medicine. Avoid alcoholic drinks.  Your mouth may get dry. Chewing sugarless gum or sucking hard candy, and drinking plenty of water will help. Contact your doctor if the problem does not go away or is severe.  What side effects may I notice from receiving this medicine?  Side effects that you should report to your doctor or health care professional as soon as possible:  -allergic reactions like skin rash, itching or hives, swelling of the face, lips, or tongue  -black, tarry stools  -breathing problems  -changes in vision  -chest pain  -confusion  -eye  pain  -fast, irregular heartbeat  -feeling faint or lightheaded, falls  -hallucination, loss of contact with reality  -loss of balance or coordination  -loss of memory  -restlessness, pacing, inability to keep still  -seizures  -stiff muscles  -suicidal thoughts or other mood changes  -trouble sleeping  -unusual bleeding or bruising  -unusually weak or tired  -vomiting  Side effects that usually do not require medical attention (report to your doctor or health care professional if they continue or are bothersome):  -change in appetite or weight  -change in sex drive or performance  -dizziness  -headache  -increased sweating  -indigestion, nausea  -tremor  This list may not describe all possible side effects. Call your doctor for medical advice about side effects. You may report side effects to FDA at 5-130-FDA-4924.  Where should I keep my medicine?  Keep out of reach of children.  Store at room temperature between 15 and 30 degrees C (59 and 86 degrees F). Throw away any unused medicine after the expiration date.  NOTE: This sheet is a summary. It may not cover all possible information. If you have questions about this medicine, talk to your doctor, pharmacist, or health care provider.     © 2017, [employer]. (2017-02-07 11:53:44)

## 2017-10-26 NOTE — PROGRESS NOTES
Subjective   Nathaly DELVIN Licea is a 61 y.o. female.     Problem List  1. Essential Hypertension   2. Morbid Obesity   3. H/O Brain Tumor/Meningioma   4. GERD  5. Constipation  6. Chronic back pain, DDD of spine  7. Hep C antibody positive  8.  Vitamin D deficiency   9.  Microcytic Anemia. Hemoglobin C. Iron deficiency anemia  10. Low TSH/subclinical hyperthyroidism   11. H/O CVA. Restricted diffusion in parietal supraventricular white matter bilaterally more on left than the  Right.    12. Hyperlipidemia ASCVD >5%  13. Hemiparesis on right side   14. depression      PSHX  1. H/O Brain Tumors sp Surgical Removal 2017 at [facility]   2. Right ankle surgery in   3. Emergency C section 40 years ago       SocialHx  -tobacco smoker.  Has been smoking for 10 years  Smokes once a day   -occasional alcohol drinker   -No illicit drugs. 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 Is here for recheck Pt has history of Left CVA and brain surgery. Since her CVA she has been having issues with right hemiparesis and speech issues.  She currently takes aspirin 81 mg PO q daily along with lipitor 20 mg PO qhs for hyperlipidemia. For hypertension pt is on lisinopril-HCTZ 20-25 mg PO BID along with norvasc 5 mg PO q daily.  For iron deficiency pt is taking iron pill 325 mg PO q daily.  On last visit pt was started on linzess 72 mcg PO q daily for constipation that has improved since taking it.  Pt missed appt to get pap smear done.  Also has yet to get colonoscopy screening. Pt is also positive for Hep C.   Pt recently saw Neurologist who started pt on aspirin 325 mg PO q daily from 81 mg daily and also plavix 75 mg PO q daily. Pt also had lipitor increased from  20 mg to 80 mg PO qhs.   Pt also was ordered PT/OT but needs a new order since it was ordered in [place] fo She has gait instability and speech issues.   She continues to take iron pill for iron deficiency anemia. Pt also has been having issues with depression since her CVA and surgery. She has lost interest in the things she likes to do for several weeks.   She denies any suicidal or homicidal ideations.  She is not on medication currently.  Pt's hypertension is better controlled with addition of norvasc 5 mg PO q daily. Pt continues to take lisinopril-HCTZ 20/25 mg PO BID. She is compliant .    Cerebrovascular Accident   This is a new problem. The current episode started more than 1 month ago. The problem occurs daily. The problem has been waxing and waning. Associated symptoms include fatigue. Pertinent negatives include no abdominal pain, anorexia, arthralgias, change in bowel habit, chest pain, chills, congestion, coughing, diaphoresis, fever, headaches, joint swelling, myalgias, nausea, neck pain, numbness, rash, sore throat, swollen glands, urinary symptoms, vertigo, visual change, vomiting or weakness. Nothing aggravates the symptoms. She has tried nothing for the symptoms. The treatment provided no relief.   Extremity Weakness    The pain is present in the right wrist, right hand, right fingers, right arm, right shoulder, right elbow, right upper leg, right knee, right lower leg, right ankle, right foot and right toes. This is a new problem. The current episode started more than 1 month ago. There has been no history of extremity trauma. The problem occurs daily. The problem has been waxing and waning. The pain is at a severity of 3/10. The pain is mild. Pertinent negatives include no fever, inability to bear weight, itching, joint locking, joint swelling, limited range of motion, numbness, stiffness or tingling. She has tried nothing for the symptoms. The treatment provided no relief. Family history does  not include gout or rheumatoid arthritis. There is no history of diabetes, gout, osteoarthritis or rheumatoid arthritis.   Depression   Visit Type: initial  Onset of symptoms: at an unknown time  Progression since onset: gradually worsening  Patient presents with the following symptoms: anhedonia, decreased concentration, depressed mood and fatigue.  Patient is not experiencing: chest pain, choking sensation, compulsions, confusion, dizziness, dry mouth, excessive worry, feelings of hopelessness, feelings of worthlessness, hypersomnia, hyperventilation, impotence, insomnia, irritability, malaise, memory impairment, muscle tension, nausea, nervousness/anxiety, obsessions, palpitations, panic, psychomotor agitation, psychomotor retardation, restlessness, shortness of breath, suicidal ideas, suicidal planning, thoughts of death, weight gain and weight loss.  Frequency of symptoms: constantly   Severity: moderate   Risk factors: stroke.  Patient has a history of: depression  No history of: anemia, anxiety/panic attacks, arrhythmia, asthma, bipolar disorder, CAD, CHF, chronic lung disease, fibromyalgia, hyperthyroidism, suicide attempt, mental illness and substance abuse  Treatment tried: nothing  Compliance with treatment: variable         The following portions of the patient's history were reviewed and updated as appropriate: allergies, current medications, past family history, past medical history, past social history, past surgical history and problem list.    Review of Systems   Constitutional: Positive for fatigue. Negative for chills, diaphoresis, fever, irritability, weight gain and weight loss.   HENT: Positive for voice change. Negative for congestion and sore throat.    Eyes: Negative.    Respiratory: Negative.  Negative for cough, choking and shortness of breath.    Cardiovascular: Negative.  Negative for chest pain and palpitations.   Gastrointestinal: Positive for constipation. Negative for abdominal pain,  "anorexia, change in bowel habit, diarrhea, nausea, rectal pain and vomiting.   Endocrine: Negative.    Genitourinary: Negative.  Negative for dysuria, impotence and pelvic pain.   Musculoskeletal: Positive for back pain, extremity weakness and gait problem. Negative for arthralgias, gout, joint swelling, myalgias, neck pain and stiffness.   Skin: Negative.  Negative for itching and rash.   Allergic/Immunologic: Negative.    Neurological: Negative for vertigo, tingling, weakness, numbness and headaches.   Hematological: Negative.    Psychiatric/Behavioral: Positive for decreased concentration. Negative for confusion, substance abuse and suicidal ideas. The patient is not nervous/anxious and does not have insomnia.        Objective    /72  Pulse 91  Temp 98.6 °F (37 °C)  Resp 16  Ht 67.5\" (171.5 cm)  Wt 278 lb 3.2 oz (126 kg)  BMI 42.93 kg/m2      Chemistry        Component Value Date/Time     08/18/2017 1341    K 3.8 08/18/2017 1341     08/18/2017 1341    CO2 25.0 08/18/2017 1341    BUN 15 08/18/2017 1341    CREATININE 1.08 (H) 08/18/2017 1341        Component Value Date/Time    CALCIUM 10.0 08/18/2017 1341    ALKPHOS 111 08/18/2017 1341    AST 13 (L) 08/18/2017 1341    ALT 24 08/18/2017 1341    BILITOT 0.4 08/18/2017 1341        Lab Results   Component Value Date    WBC 6.45 08/18/2017    HGB 11.6 (L) 08/18/2017    HCT 32.9 (L) 09/20/2017    MCV 78.5 (L) 08/18/2017     08/18/2017     Lab Results   Component Value Date    CHOL 248 (H) 08/18/2017    TRIG 153 08/18/2017    HDL 79 08/18/2017    LDLDIRECT 114 08/18/2017     Lab Results   Component Value Date    HGBA1C 5.7 (H) 08/18/2017     Lab Results   Component Value Date    TSH 0.540 09/20/2017     Office Visit on 09/11/2017   Component Date Value Ref Range Status   • Hepatitis C Ab 09/20/2017 Reactive* Negative Final   • Please note 09/20/2017 Comment   Final    This test was developed and its performance characteristics determined  by " [facility].  It has not been cleared or approved by the U.S. Food and  Drug Administration.  The FDA has determined that such clearance or approval is not  necessary. This test is used for clinical purposes.  It should not be  regarded as investigational or for research.   • Hepatitis C Genotype 09/20/2017 Comment   Final    Specimen has insufficient hepatitis C virus RNA to obtain genotyping  results. This genotyping assay should only be used for known HCV  positive patients with HCV RNA levels above 1000 IU/mL.   • Hepatitis C Quantitation 09/20/2017 HCV Not Detected  IU/mL Final   • Test Information 09/20/2017 Comment   Final    The quantitative range of this assay is 15 IU/mL to 100 million IU/mL.   • Performed by: 09/20/2017 Dariel Linn M.D.   Final   • Pathologist Interpretation 09/20/2017    Final                    Value:Red cells are normocytic/normochromic with mild anisocytosis.  Leukocyte count is normal, my differential count:  Neutrophils 45%, bands 1%, lymphocytes 45%, reactive lymphocytes 2%, eosinophils 4%, monocytes 3%.  Occasional giant platelets seen.  Serum studies show no evidence of nutritional deficiency.    Impression:  Anemia, borderline, with inadequate reticulocyte response.  Giant platelets seen.   • Reticulocyte % 09/20/2017 0.86  0.63 - 2.13 % Final   • Reticulocyte Absolute 09/20/2017 0.0368  0.0250 - 0.1250 10*6/mm3 Final   • Immature Reticulocyte Fraction 09/20/2017 11.3  3.0 - 15.9 % Final   • Reticulocyte Production Index 09/20/2017 0.39* 0.63 - 2.13 % Final   • Hematocrit 09/20/2017 32.9* 35.0 - 45.0 % Final   • Reticulocyte Hgb 09/20/2017 31.7  30.0 - 38.0 pg Final   • TSH 09/20/2017 0.540  0.460 - 4.680 mIU/mL Final   • Free T4 09/20/2017 1.33  0.78 - 2.19 ng/dL Final   • T3, Free 09/20/2017 2.4  2.0 - 4.4 pg/mL Final   • Ferritin 09/20/2017 129.00  11.10 - 264.00 ng/mL Final   • Folate 09/20/2017 8.11  2.76 - 21.00 ng/mL Final   • Vitamin B-12 09/20/2017 161 159 - 110  pg/mL Final   • Iron 09/20/2017 33* 37 - 170 mcg/dL Final   • TIBC 09/20/2017 304  265 - 497 mcg/dL Final   • Iron Saturation 09/20/2017 11* 15 - 50 % Final   • Fecal Occult Blood 09/22/2017 Negative  Negative Final       Physical Exam   Constitutional: She is oriented to person, place, and time. She appears well-developed and well-nourished. No distress.   Pt has speech issues    HENT:   Head: Normocephalic and atraumatic.   Right Ear: External ear normal.   Left Ear: External ear normal.   Eyes: Conjunctivae and EOM are normal. Pupils are equal, round, and reactive to light. Right eye exhibits no discharge. Left eye exhibits no discharge. No scleral icterus.   Neck: Normal range of motion. Neck supple. No JVD present. No tracheal deviation present. No thyromegaly present.   Cardiovascular: Normal rate, regular rhythm and normal heart sounds.    Pulmonary/Chest: Effort normal and breath sounds normal. No stridor. No respiratory distress. She has no wheezes.   Abdominal: Soft. She exhibits no distension and no mass. There is no tenderness. There is no rebound and no guarding. No hernia.   Musculoskeletal: She exhibits tenderness. She exhibits no edema or deformity.        Lumbar back: She exhibits decreased range of motion, tenderness, bony tenderness, pain and spasm.   Motor strength 4/5 in upper and lower right extremity     Gait instability    Lymphadenopathy:     She has no cervical adenopathy.   Neurological: She is alert and oriented to person, place, and time. She has normal reflexes. No cranial nerve deficit. Coordination normal.   Skin: Skin is warm and dry. No rash noted. She is not diaphoretic. No erythema. No pallor.   Psychiatric: She has a normal mood and affect. Her behavior is normal.   Nursing note and vitals reviewed.      Assessment/Plan   Problems Addressed this Visit        Cardiovascular and Mediastinum    Cerebrovascular accident (CVA)    Hyperlipidemia    Relevant Medications    atorvastatin  (LIPITOR) 80 MG tablet    Essential hypertension    Relevant Medications    lisinopril-hydrochlorothiazide (PRINZIDE,ZESTORETIC) 20-25 MG per tablet    amLODIPine (NORVASC) 5 MG tablet       Digestive    Constipation       Nervous and Auditory    History of brain tumor    Right hemiparesis    Relevant Orders    Ambulatory Referral to Physical Therapy Evaluate and treat       Other    Slurring of speech    Gait instability    Relevant Orders    Ambulatory Referral to Physical Therapy Evaluate and treat    History of CVA (cerebrovascular accident) - Primary    Relevant Orders    Ambulatory Referral to Physical Therapy Evaluate and treat    US Carotid Bilateral    Ambulatory Referral to Speech Therapy    Depression    Relevant Medications    nicotine polacrilex (NICORETTE) 4 MG gum    nicotine (NICODERM CQ) 21 MG/24HR patch    citalopram (CELEXA) 10 MG tablet      - for history of CVA - referral to PT/OT for gait instability and speech therapy. Consultation appreciated. Refilled lipitor 80 mg PO qhs. plavix 75 mg PO q daily and aspirin 325 mg PO q daily.   Will also get US carotid bilateral to rule out any stenosis.    - for hyperlipidemia - lipitor 80 mg PO qhs   - depression - will start on celexa 10 mg PO q daily.  Drug information given. Recommended pt to go see counseling but pt declined at this time  - for constipation - better with linzess 72 mcg PO q daily. Advised pt to go see Gastroenterologist for colonoscopy screening and Hep C antibody positive  - hypertension - BP at goal today. Continue with antihypertensive medications  - tobacco user - 1 800 QUIT NOW.  She has cut down to 1 cigarette a day.  Continue with nicorette patch and gum

## 2017-11-22 ENCOUNTER — OFFICE VISIT (OUTPATIENT)
Dept: FAMILY MEDICINE CLINIC | Facility: CLINIC | Age: 62
End: 2017-11-22

## 2017-11-22 VITALS
HEART RATE: 91 BPM | RESPIRATION RATE: 16 BRPM | DIASTOLIC BLOOD PRESSURE: 73 MMHG | SYSTOLIC BLOOD PRESSURE: 112 MMHG | TEMPERATURE: 99.3 F | BODY MASS INDEX: 40.38 KG/M2 | HEIGHT: 68 IN | WEIGHT: 266.4 LBS

## 2017-11-22 DIAGNOSIS — R26.81 GAIT INSTABILITY: ICD-10-CM

## 2017-11-22 DIAGNOSIS — I82.622 DEEP VEIN THROMBOSIS (DVT) OF LEFT UPPER EXTREMITY, UNSPECIFIED CHRONICITY, UNSPECIFIED VEIN (HCC): ICD-10-CM

## 2017-11-22 DIAGNOSIS — I10 ESSENTIAL HYPERTENSION: ICD-10-CM

## 2017-11-22 DIAGNOSIS — E78.5 HYPERLIPIDEMIA, UNSPECIFIED HYPERLIPIDEMIA TYPE: ICD-10-CM

## 2017-11-22 DIAGNOSIS — G47.33 OSA (OBSTRUCTIVE SLEEP APNEA): ICD-10-CM

## 2017-11-22 DIAGNOSIS — R53.1 WEAKNESS: ICD-10-CM

## 2017-11-22 DIAGNOSIS — G47.9 SLEEP DISORDER: ICD-10-CM

## 2017-11-22 DIAGNOSIS — Z86.73 HISTORY OF CVA (CEREBROVASCULAR ACCIDENT): ICD-10-CM

## 2017-11-22 DIAGNOSIS — I46.9 CARDIAC ARREST (HCC): ICD-10-CM

## 2017-11-22 DIAGNOSIS — F32.A DEPRESSION, UNSPECIFIED DEPRESSION TYPE: ICD-10-CM

## 2017-11-22 DIAGNOSIS — I63.9 CEREBROVASCULAR ACCIDENT (CVA), UNSPECIFIED MECHANISM (HCC): Primary | ICD-10-CM

## 2017-11-22 DIAGNOSIS — Z72.0 TOBACCO USER: ICD-10-CM

## 2017-11-22 DIAGNOSIS — R47.81 SLURRING OF SPEECH: ICD-10-CM

## 2017-11-22 PROCEDURE — 99214 OFFICE O/P EST MOD 30 MIN: CPT | Performed by: FAMILY MEDICINE

## 2017-11-22 RX ORDER — AMLODIPINE BESYLATE 10 MG/1
10 TABLET ORAL DAILY
COMMUNITY
Start: 2017-11-21 | End: 2017-12-22

## 2017-11-22 NOTE — PROGRESS NOTES
Subjective   Nathaly DELVIN Licea is a 62 y.o. female.     Problem List  1. Essential Hypertension   2. Morbid Obesity   3. H/O Brain Tumor/Meningioma   4. GERD  5. Constipation  6. Chronic back pain, DDD of spine  7. Hep C antibody positive/chronic hepatitis C  8.  Vitamin D deficiency   9.  Microcytic Anemia. Hemoglobin C. Iron deficiency anemia  10. Low TSH/subclinical hyperthyroidism   11. H/O CVA. Restricted diffusion in parietal supraventricular white matter bilaterally more on left than the  Right.    12. Hyperlipidemia ASCVD >5%  13. Hemiparesis on right side   14. Depression  15. Proximal LUE DVT      PSHX  1. H/O Brain Tumors sp Surgical Removal 2017 at [facility]   2. Right ankle surgery in   3. Emergency C section 40 years ago       SocialHx  -tobacco smoker.  Has been smoking for 10 years  Smokes once a day   -occasional alcohol drinker   -No illicit drugs. 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 Is here for recheck Pt has history of Left CVA and brain surgery. Since her CVA she has been having issues with right hemiparesis and speech issues.  She currently takes aspirin 81 mg PO q daily along with lipitor 20 mg PO qhs for hyperlipidemia. For hypertension pt is on lisinopril-HCTZ 20-25 mg PO BID along with norvasc 5 mg PO q daily.  For iron deficiency pt is taking iron pill 325 mg PO q daily.  On last visit pt was started on linzess 72 mcg PO q daily for constipation that has improved since taking it.  Pt missed appt to get pap smear done.  Also has yet to get colonoscopy screening. Pt is also positive for Hep C.   Pt recently saw Neurologist who started pt on aspirin 325 mg PO q daily from 81 mg daily and also plavix 75 mg PO q  daily. Pt also had lipitor increased from 20 mg to 80 mg PO qhs.   Pt also was ordered PT/OT but needs a new order since it was ordered in [place] fo She has gait instability and speech issues.   She continues to take iron pill for iron deficiency anemia. Pt also has been having issues with depression since her CVA and surgery. She has lost interest in the things she likes to do for several weeks.   She denies any suicidal or homicidal ideations.  She is not on medication currently.  Pt's hypertension is better controlled with addition of norvasc 5 mg PO q daily. Pt continues to take lisinopril-HCTZ 20/25 mg PO BID. She is compliant .    Pt was recently admitted to [facility] after presenting to ED with  AMS and subsequent PEA arrest. Pt had CPR performed.   Her hemodynamic improved with IV antbiotics, fluids and pressure.  Septic workup was negative.  An EKG and echocardiogram was done that showed structural or electrical heart disease . PT/OT evaluted pt and pt was discharged on 11/21/17. Neurologist was consulted and had low suspicion for actue stroke causing her symptoms.  Pt also was found to have an upper extremity DVT that was line associated.  A duplex US of LUE was done.  It was not proximal enough to warrant systemic anticogulation. It was recommended pt get outapatient sleep study and see PT/OT regarding gait issues. She is wearing an outpt Holter monitor and she is to follow up with Neurologist and Cardiologist soon. Pt reports no chest pain or shortness of breath today.  She denies any headaches.  She has no home health set up yet.  Pt is a high fall risk     Cerebrovascular Accident   This is a new problem. The current episode started more than 1 month ago. The problem occurs daily. The problem has been waxing and waning. Associated symptoms include fatigue. Pertinent negatives include no abdominal pain, anorexia, arthralgias, change in bowel habit, chest pain, chills, congestion, coughing,  diaphoresis, fever, headaches, joint swelling, myalgias, nausea, neck pain, numbness, rash, sore throat, swollen glands, urinary symptoms, vertigo, visual change, vomiting or weakness. Nothing aggravates the symptoms. She has tried nothing for the symptoms. The treatment provided no relief.   Extremity Weakness    The pain is present in the right wrist, right hand, right fingers, right arm, right shoulder, right elbow, right upper leg, right knee, right lower leg, right ankle, right foot and right toes. This is a new problem. The current episode started more than 1 month ago. There has been no history of extremity trauma. The problem occurs daily. The problem has been waxing and waning. The pain is at a severity of 3/10. The pain is mild. Pertinent negatives include no fever, inability to bear weight, itching, joint locking, joint swelling, limited range of motion, numbness, stiffness or tingling. She has tried nothing for the symptoms. The treatment provided no relief. Family history does not include gout or rheumatoid arthritis. There is no history of diabetes, gout, osteoarthritis or rheumatoid arthritis.   Depression   Visit Type: initial  Onset of symptoms: at an unknown time  Progression since onset: gradually worsening  Patient presents with the following symptoms: anhedonia, decreased concentration, depressed mood and fatigue.  Patient is not experiencing: chest pain, choking sensation, compulsions, confusion, dizziness, dry mouth, excessive worry, feelings of hopelessness, feelings of worthlessness, hypersomnia, hyperventilation, impotence, insomnia, irritability, malaise, memory impairment, muscle tension, nausea, nervousness/anxiety, obsessions, palpitations, panic, psychomotor agitation, psychomotor retardation, restlessness, shortness of breath, suicidal ideas, suicidal planning, thoughts of death, weight gain and weight loss.  Frequency of symptoms: constantly   Severity: moderate   Risk factors:  "stroke.  Patient has a history of: depression  No history of: anemia, anxiety/panic attacks, arrhythmia, asthma, bipolar disorder, CAD, CHF, chronic lung disease, fibromyalgia, hyperthyroidism, suicide attempt, mental illness and substance abuse  Treatment tried: nothing  Compliance with treatment: variable         The following portions of the patient's history were reviewed and updated as appropriate: allergies, current medications, past family history, past medical history, past social history, past surgical history and problem list.    Review of Systems   Constitutional: Positive for fatigue. Negative for chills, diaphoresis, fever, irritability, weight gain and weight loss.   HENT: Positive for voice change. Negative for congestion and sore throat.    Eyes: Negative.    Respiratory: Negative.  Negative for cough, choking and shortness of breath.    Cardiovascular: Negative.  Negative for chest pain and palpitations.   Gastrointestinal: Positive for constipation. Negative for abdominal pain, anorexia, change in bowel habit, diarrhea, nausea, rectal pain and vomiting.   Endocrine: Negative.    Genitourinary: Negative.  Negative for dysuria, impotence and pelvic pain.   Musculoskeletal: Positive for back pain, extremity weakness and gait problem. Negative for arthralgias, gout, joint swelling, myalgias, neck pain and stiffness.   Skin: Negative.  Negative for itching and rash.   Allergic/Immunologic: Negative.    Neurological: Negative for vertigo, tingling, weakness, numbness and headaches.   Hematological: Negative.    Psychiatric/Behavioral: Positive for decreased concentration. Negative for confusion, substance abuse and suicidal ideas. The patient is not nervous/anxious and does not have insomnia.        Objective    /73  Pulse 91  Temp 99.3 °F (37.4 °C)  Resp 16  Ht 67.5\" (171.5 cm)  Wt 266 lb 6.4 oz (121 kg)  BMI 41.11 kg/m2    /73  Pulse 91  Temp 99.3 °F (37.4 °C)  Resp 16  Ht 67.5\" " (171.5 cm)  Wt 266 lb 6.4 oz (121 kg)  BMI 41.11 kg/m2      Chemistry        Component Value Date/Time     08/18/2017 1341    K 3.8 08/18/2017 1341     08/18/2017 1341    CO2 25.0 08/18/2017 1341    BUN 15 08/18/2017 1341    CREATININE 1.08 (H) 08/18/2017 1341        Component Value Date/Time    CALCIUM 10.0 08/18/2017 1341    ALKPHOS 111 08/18/2017 1341    AST 13 (L) 08/18/2017 1341    ALT 24 08/18/2017 1341    BILITOT 0.4 08/18/2017 1341        Lab Results   Component Value Date    WBC 6.45 08/18/2017    HGB 11.6 (L) 08/18/2017    HCT 32.9 (L) 09/20/2017    MCV 78.5 (L) 08/18/2017     08/18/2017     Lab Results   Component Value Date    CHOL 248 (H) 08/18/2017    TRIG 153 08/18/2017    HDL 79 08/18/2017    LDLDIRECT 114 08/18/2017     Lab Results   Component Value Date    HGBA1C 5.7 (H) 08/18/2017     Lab Results   Component Value Date    TSH 0.540 09/20/2017     Office Visit on 09/11/2017   Component Date Value Ref Range Status   • Hepatitis C Ab 09/20/2017 Reactive* Negative Final   • Please note 09/20/2017 Comment   Final    This test was developed and its performance characteristics determined  by [facility].  It has not been cleared or approved by the U.S. Food and  Drug Administration.  The FDA has determined that such clearance or approval is not  necessary. This test is used for clinical purposes.  It should not be  regarded as investigational or for research.   • Hepatitis C Genotype 09/20/2017 Comment   Final    Specimen has insufficient hepatitis C virus RNA to obtain genotyping  results. This genotyping assay should only be used for known HCV  positive patients with HCV RNA levels above 1000 IU/mL.   • Hepatitis C Quantitation 09/20/2017 HCV Not Detected  IU/mL Final   • Test Information 09/20/2017 Comment   Final    The quantitative range of this assay is 15 IU/mL to 100 million IU/mL.   • Performed by: 09/20/2017 Dariel Linn M.D.   Final   • Pathologist Interpretation  09/20/2017    Final                    Value:Red cells are normocytic/normochromic with mild anisocytosis.  Leukocyte count is normal, my differential count:  Neutrophils 45%, bands 1%, lymphocytes 45%, reactive lymphocytes 2%, eosinophils 4%, monocytes 3%.  Occasional giant platelets seen.  Serum studies show no evidence of nutritional deficiency.    Impression:  Anemia, borderline, with inadequate reticulocyte response.  Giant platelets seen.   • Reticulocyte % 09/20/2017 0.86  0.63 - 2.13 % Final   • Reticulocyte Absolute 09/20/2017 0.0368  0.0250 - 0.1250 10*6/mm3 Final   • Immature Reticulocyte Fraction 09/20/2017 11.3  3.0 - 15.9 % Final   • Reticulocyte Production Index 09/20/2017 0.39* 0.63 - 2.13 % Final   • Hematocrit 09/20/2017 32.9* 35.0 - 45.0 % Final   • Reticulocyte Hgb 09/20/2017 31.7  30.0 - 38.0 pg Final   • TSH 09/20/2017 0.540  0.460 - 4.680 mIU/mL Final   • Free T4 09/20/2017 1.33  0.78 - 2.19 ng/dL Final   • T3, Free 09/20/2017 2.4  2.0 - 4.4 pg/mL Final   • Ferritin 09/20/2017 129.00  11.10 - 264.00 ng/mL Final   • Folate 09/20/2017 8.11  2.76 - 21.00 ng/mL Final   • Vitamin B-12 09/20/2017 499  239 - 931 pg/mL Final   • Iron 09/20/2017 33* 37 - 170 mcg/dL Final   • TIBC 09/20/2017 304  265 - 497 mcg/dL Final   • Iron Saturation 09/20/2017 11* 15 - 50 % Final   • Fecal Occult Blood 09/22/2017 Negative  Negative Final       Physical Exam   Constitutional: She is oriented to person, place, and time. She appears well-developed and well-nourished. No distress.   Pt has speech issues    HENT:   Head: Normocephalic and atraumatic.   Right Ear: External ear normal.   Left Ear: External ear normal.   Eyes: Conjunctivae and EOM are normal. Pupils are equal, round, and reactive to light. Right eye exhibits no discharge. Left eye exhibits no discharge. No scleral icterus.   Neck: Normal range of motion. Neck supple. No JVD present. No tracheal deviation present. No thyromegaly present.   Cardiovascular:  Normal rate, regular rhythm and normal heart sounds.    Pulmonary/Chest: Effort normal and breath sounds normal. No stridor. No respiratory distress. She has no wheezes.   Abdominal: Soft. She exhibits no distension and no mass. There is no tenderness. There is no rebound and no guarding. No hernia.   Musculoskeletal: She exhibits tenderness. She exhibits no edema or deformity.        Lumbar back: She exhibits decreased range of motion, tenderness, bony tenderness, pain and spasm.   Motor strength 4/5 in upper and lower right extremity     Gait instability    Pt is high fall risk  Get up and go test >10 seconds     Lymphadenopathy:     She has no cervical adenopathy.   Neurological: She is alert and oriented to person, place, and time. She has normal reflexes. No cranial nerve deficit. Coordination normal.   Skin: Skin is warm and dry. No rash noted. She is not diaphoretic. No erythema. No pallor.   Psychiatric: She has a normal mood and affect. Her behavior is normal.   Nursing note and vitals reviewed.      Assessment/Plan   Problems Addressed this Visit        Cardiovascular and Mediastinum    Cerebrovascular accident (CVA) - Primary    Relevant Orders    Ambulatory Referral to Home Health    Hyperlipidemia    Essential hypertension    Relevant Medications    amLODIPine (NORVASC) 10 MG tablet    Deep vein thrombosis (DVT) of left upper extremity       Other    Tobacco user    Slurring of speech    Gait instability    History of CVA (cerebrovascular accident)    Depression      Other Visit Diagnoses     Sleep disorder        Relevant Orders    Ambulatory Referral to Sleep Medicine    Ambulatory Referral to Home Health    CA (obstructive sleep apnea)        Relevant Orders    Ambulatory Referral to Sleep Medicine    Ambulatory Referral to Home Health    Cardiac arrest        Relevant Medications    amLODIPine (NORVASC) 10 MG tablet    Other Relevant Orders    Ambulatory Referral to Home Health    Weakness         Relevant Orders    Ambulatory Referral to Home Health        - have records from [place] regarding pt's course of care. Will refer pt to Sleep study for CA and sleep study with Dr. Ybarra. Consultation appreciated  -cardiac arrest - pt wearing outpt Holter Monitor. She is to follow with Cardiologist in [place] soon. Recent echocardiogram showed no structural heart disease   - tobacco user- counseled pt to stop smoking. Counseled >5 minutes   - DVT of LUE - anticoagulation not indicated at this time. Pt advised to go to ER or call 911 if developing chest pain, shortness of breath or dizziness  - for history of CVA /gait instability/slurring of speech- referral to [facility] for  PT/OT for gait instability and speech therapy. Consultation appreciated. Refilled lipitor 80 mg PO qhs. plavix 75 mg PO q daily and aspirin 325 mg PO q daily.   Will also get US carotid bilateral to rule out any stenosis.  Home Health for , medication management basic nursing and neurovascular and cardiology monitoring  - for hyperlipidemia - lipitor 80 mg PO qhs   - depression - continue on celexa 10 mg PO q daily.  Drug information given. Recommended pt to go see counseling but pt declined at this time  - for constipation - better with linzess 72 mcg PO q daily. Advised pt to go see Gastroenterologist for colonoscopy screening and Hep C antibody positive  - hypertension - BP at goal today. Continue with antihypertensive medications  - pt has appt for pap smear, colonoscopy and mammogram soon   - tobacco user - 1 800 QUIT NOW.  She has cut down to 1 cigarette a day.  Continue with nicorette patch and gum   - recheck in 1 month

## 2017-12-05 ENCOUNTER — OFFICE VISIT (OUTPATIENT)
Dept: GASTROENTEROLOGY | Facility: CLINIC | Age: 62
End: 2017-12-05

## 2017-12-05 VITALS
DIASTOLIC BLOOD PRESSURE: 62 MMHG | BODY MASS INDEX: 39.4 KG/M2 | HEIGHT: 68 IN | SYSTOLIC BLOOD PRESSURE: 112 MMHG | WEIGHT: 260 LBS

## 2017-12-05 DIAGNOSIS — D50.0 IRON DEFICIENCY ANEMIA DUE TO CHRONIC BLOOD LOSS: Primary | ICD-10-CM

## 2017-12-05 DIAGNOSIS — K59.00 CONSTIPATION, UNSPECIFIED CONSTIPATION TYPE: ICD-10-CM

## 2017-12-05 DIAGNOSIS — K21.9 GASTROESOPHAGEAL REFLUX DISEASE, ESOPHAGITIS PRESENCE NOT SPECIFIED: ICD-10-CM

## 2017-12-05 PROCEDURE — 99213 OFFICE O/P EST LOW 20 MIN: CPT | Performed by: PHYSICIAN ASSISTANT

## 2017-12-05 NOTE — PROGRESS NOTES
Chief Complaint   Patient presents with   • Eval For Colonoscopy     Ref. Dr. Amisha CLEVELAND PROCEDURE ORDERED:    Raphael Licea is a 62 y.o. female. she is being seen for consultation today at the request of Brown Lion MD    History of Present Illness    EPIC was down at the time I saw this patient.     This 61-year-old disabled female was sent for consultation for iron deficiency anemia, constipation by Dr. Lion who saw the patient on 11/22/17 for follow-up on a CVA with brain surgery.  Patient had been to [facility] with mental status changes, pulses electrical activity, DVT.  I did not have any of her records pertaining to this.  Telephone, fax machine, EMR were all down.  Patient had an MRI of her brain on 9/19/17 showed resection from left frontal meningioma acute infarct worse on the left.  Laboratories on 9/20/17 serum iron 33, 11% saturation.  Normal B12, folate, ferritin, TSH, T3, T4.  Low normal reticulocyte count.  HCV RNA by PCR quantitative was not detected.  HCV genotype was insufficient.  HCV antibody was reactive.  She had a negative occult stool on 9/22/17.  Carotid ultrasound at [facility] was normal on 11/1/17.    Patient states she's been getting better after her stroke.  She still getting physical therapy and occupational therapy.  She denies abdominal pain.  She states she gets frequent heartburn and has to eat a lot of Tums.  She denied nausea, vomiting, dysphagia.  She states she tends to be constipated and takes Linzess weekly.  She seen no blood in her stool.  Her weight is stable.  She's never had endoscopy.  She is on iron.  She is currently on Plavix.    Patient was a previous smoker quit several months ago, denies alcohol, illicit substance use.  She has a history of stroke with surgery as noted above, elevated blood pressure, history of pneumonia.  She denies abdominal history of surgery but states she has lost some of her memory.  Family history  diabetes, heart disease, ulcers, stroke, gallstones, hypertension.  Father  from [place], mother and spouse all .  One brother, one sister, 5 children all in apparent good health.    A/P: Patient with GERD, constipation, abdominal pain, iron deficiency with recent CVA requiring surgery at high risk to undergo endoscopic evaluation at this time.  Patient does need EGD/colonoscopy.  I recommended we wait until she has more fully recovered.  She will need clearance to have endoscopy.  Patient was agreeable.  We'll repeat H&H, iron, reticulocyte count.  We'll have her follow-up in January with further pending clinical course and the results of the above.    Thank you very much Dr. Lion for this consultation, and for allowing us to participate in the care of your patient.  We'll keep you informed.     The following portions of the patient's history were reviewed and updated as appropriate:   Past Medical History:   Diagnosis Date   • Allergic rhinitis    • Essential hypertension    • GERD (gastroesophageal reflux disease)    • Low back pain    • Upper respiratory infection      Past Surgical History:   Procedure Laterality Date   • BRAIN SURGERY       Family History   Problem Relation Age of Onset   • Alcohol abuse Other    • Diabetes Other    • Heart disease Other    • Hypertension Other    • COPD Other    • Alcohol abuse Mother    • Arthritis Mother    • COPD Mother    • Heart disease Mother    • Hypertension Mother    • Stroke Mother    • Diabetes Father    • Heart disease Father    • Diabetes Paternal Grandmother      OB History     No data available        No Known Allergies  Social History     Social History   • Marital status: Legally      Spouse name: N/A   • Number of children: N/A   • Years of education: N/A     Social History Main Topics   • Smoking status: Current Every Day Smoker   • Smokeless tobacco: Never Used   • Alcohol use No   • Drug use: No   • Sexual activity: Not Asked  "    Other Topics Concern   • None     Social History Narrative       Current Outpatient Prescriptions:   •  amLODIPine (NORVASC) 10 MG tablet, Take 10 mg by mouth Daily., Disp: , Rfl:   •  atorvastatin (LIPITOR) 80 MG tablet, Take 1 tablet by mouth Daily., Disp: 30 tablet, Rfl: 11  •  ferrous sulfate 325 (65 FE) MG tablet, Take 1 tablet by mouth Daily With Breakfast., Disp: 30 tablet, Rfl: 11  •  linaclotide (LINZESS) 72 MCG capsule capsule, Take 1 capsule by mouth Every Morning Before Breakfast., Disp: 30 capsule, Rfl: 11  •  lisinopril-hydrochlorothiazide (PRINZIDE,ZESTORETIC) 20-25 MG per tablet, Take 2 tablets by mouth Daily., Disp: 60 tablet, Rfl: 11  •  aspirin  MG tablet, Take 1 tablet by mouth Daily., Disp: 30 tablet, Rfl: 11  •  clopidogrel (PLAVIX) 75 MG tablet, Take 1 tablet by mouth Daily., Disp: 30 tablet, Rfl: 11  •  nicotine (NICODERM CQ) 21 MG/24HR patch, Place 1 patch on the skin Daily., Disp: 30 patch, Rfl: 11  •  nicotine polacrilex (NICORETTE) 4 MG gum, Chew 1 each As Needed for Smoking Cessation., Disp: 60 each, Rfl: 11  •  pantoprazole (PROTONIX) 40 MG EC tablet, Take 1 tablet by mouth Daily., Disp: 30 tablet, Rfl: 11  Review of Systems  Review of Systems   Constitutional: Negative for unexpected weight change.   HENT: Negative for trouble swallowing and voice change.    Respiratory: Negative for chest tightness.    Gastrointestinal: Positive for abdominal pain, constipation and nausea. Negative for abdominal distention, anal bleeding, blood in stool, diarrhea, rectal pain and vomiting.   Genitourinary: Negative for difficulty urinating.   Neurological: Positive for dizziness, facial asymmetry, speech difficulty, weakness and numbness.   All other systems reviewed and are negative.         Objective    /62 (BP Location: Right arm)  Ht 171.5 cm (67.52\")  Wt 118 kg (260 lb)  BMI 40.1 kg/m2  Physical Exam   Constitutional: She is oriented to person, place, and time. She appears " well-developed and well-nourished. No distress.   SAE Romero. Chronically ill appearing   HENT:   Head: Normocephalic and atraumatic.   Eyes: EOM are normal. Pupils are equal, round, and reactive to light.   Neck: Normal range of motion.   Cardiovascular: Normal rate, regular rhythm and normal heart sounds.    Pulmonary/Chest: Effort normal and breath sounds normal.   Abdominal: Soft. Bowel sounds are normal. She exhibits no shifting dullness, no distension, no abdominal bruit, no ascites and no mass. There is no hepatosplenomegaly. There is tenderness. There is no rigidity, no rebound, no guarding and no CVA tenderness. No hernia. Hernia confirmed negative in the ventral area.   Limited. Mid abd   Musculoskeletal: Normal range of motion.   Neurological: She is alert and oriented to person, place, and time.   Skin: Skin is warm and dry.   Psychiatric: She has a normal mood and affect. Her behavior is normal. Judgment and thought content normal.   Nursing note and vitals reviewed.    Assessment/Plan      1. Iron deficiency anemia due to chronic blood loss    2. Gastroesophageal reflux disease, esophagitis presence not specified    3. Constipation, unspecified constipation type    .   Nathaly was seen today for eval for colonoscopy.    Diagnoses and all orders for this visit:    Iron deficiency anemia due to chronic blood loss  -     Hemoglobin & Hematocrit, Blood  -     Iron  -     Reticulocytes    Gastroesophageal reflux disease, esophagitis presence not specified    Constipation, unspecified constipation type        Orders placed during this encounter include:  Orders Placed This Encounter   Procedures   • Hemoglobin & Hematocrit, Blood   • Iron   • Reticulocytes       Medications prescribed:  No orders of the defined types were placed in this encounter.    Discontinued Medications       Reason for Discontinue    aspirin  MG tablet Discontinued by another clinician    citalopram (CELEXA) 10 MG tablet  Discontinued by another clinician        Requested Prescriptions      No prescriptions requested or ordered in this encounter       Review and/or summary of lab tests, radiology, procedures, medications. Review and summary of old records and obtaining of history. The risks and benefits of my recommendations, as well as other treatment options were discussed with the patient today. Questions were answered.    Follow-up: Return in about 6 weeks (around 1/16/2018).     * Surgery not found *      This document has been electronically signed by Brown Healy PA-C on December 20, 2017 2:47 PM      Results for orders placed or performed in visit on 09/11/17   Hepatitis C antibody   Result Value Ref Range    Hepatitis C Ab Reactive (A) Negative   Iron Profile   Result Value Ref Range    Iron 33 (L) 37 - 170 mcg/dL    TIBC 304 265 - 497 mcg/dL    Iron Saturation 11 (L) 15 - 50 %   Peripheral Blood Smear   Result Value Ref Range    Performed by: Dariel Linn M.D.     Pathologist Interpretation       Red cells are normocytic/normochromic with mild anisocytosis.  Leukocyte count is normal, my differential count:  Neutrophils 45%, bands 1%, lymphocytes 45%, reactive lymphocytes 2%, eosinophils 4%, monocytes 3%.  Occasional giant platelets seen.  Serum studies show no evidence of nutritional deficiency.    Impression:  Anemia, borderline, with inadequate reticulocyte response.  Giant platelets seen.   Occult Blood X 1, Stool   Result Value Ref Range    Fecal Occult Blood Negative Negative   Hepatitis C Genotype   Result Value Ref Range    Please note Comment     Hepatitis C Genotype Comment    Hepatitis C RNA, Quantitative, PCR (graph)   Result Value Ref Range    Hepatitis C Quantitation HCV Not Detected IU/mL    Test Information Comment    Reticulocytes   Result Value Ref Range    Reticulocyte % 0.86 0.63 - 2.13 %    Reticulocyte Absolute 0.0368 0.0250 - 0.1250 10*6/mm3    Immature Reticulocyte Fraction 11.3 3.0 - 15.9  %    Reticulocyte Production Index 0.39 (L) 0.63 - 2.13 %    Hematocrit 32.9 (L) 35.0 - 45.0 %    Reticulocyte Hgb 31.7 30.0 - 38.0 pg   T3, Free   Result Value Ref Range    T3, Free 2.4 2.0 - 4.4 pg/mL   TSH   Result Value Ref Range    TSH 0.540 0.460 - 4.680 mIU/mL   T4, Free   Result Value Ref Range    Free T4 1.33 0.78 - 2.19 ng/dL   Folate   Result Value Ref Range    Folate 8.11 2.76 - 21.00 ng/mL   Ferritin   Result Value Ref Range    Ferritin 129.00 11.10 - 264.00 ng/mL   Vitamin B12   Result Value Ref Range    Vitamin B-12 499 239 - 931 pg/mL   Results for orders placed or performed in visit on 08/16/17   CBC Auto Differential   Result Value Ref Range    WBC 6.45 3.20 - 9.80 10*3/mm3    RBC 4.33 3.77 - 5.16 10*6/mm3    Hemoglobin 11.6 (L) 12.0 - 15.5 g/dL    Hematocrit 34.0 (L) 35.0 - 45.0 %    MCV 78.5 (L) 80.0 - 98.0 fL    MCH 26.8 26.5 - 34.0 pg    MCHC 34.1 31.4 - 36.0 g/dL    RDW 16.4 (H) 11.5 - 14.5 %    RDW-SD 47.0 (H) 36.4 - 46.3 fl    MPV 10.5 8.0 - 12.0 fL    Platelets 217 150 - 450 10*3/mm3    Neutrophil % 47.0 37.0 - 80.0 %    Lymphocyte % 42.3 10.0 - 50.0 %    Monocyte % 7.4 0.0 - 12.0 %    Eosinophil % 2.8 0.0 - 7.0 %    Basophil % 0.3 0.0 - 2.0 %    Immature Grans % 0.2 0.0 - 0.5 %    Neutrophils, Absolute 3.03 2.00 - 8.60 10*3/mm3    Lymphocytes, Absolute 2.73 0.60 - 4.20 10*3/mm3    Monocytes, Absolute 0.48 0.00 - 0.90 10*3/mm3    Eosinophils, Absolute 0.18 0.00 - 0.70 10*3/mm3    Basophils, Absolute 0.02 0.00 - 0.20 10*3/mm3    Immature Grans, Absolute 0.01 0.00 - 0.02 10*3/mm3   Insulin, Random   Result Value Ref Range    Insulin 16 uIU/mL   Hepatitis Panel, Acute   Result Value Ref Range    Hepatitis C Ab Reactive (A) Negative    Hep A IgM Negative Negative    Hep B C IgM Negative Negative    Hepatitis B Surface Ag Negative Negative   Vitamin D 25 Hydroxy   Result Value Ref Range    25 Hydroxy, Vitamin D <12.8 (L) 30.0 - 100.0 ng/ml   Hgb. Frac. Profile   Result Value Ref Range    Hgb  Solubility Negative Negative    Hgb F Quant 0.0 0.0 - 2.0 %    Hgb A 58.8 (L) 94.0 - 98.0 %    Hgb S 0.0 0.0 %    Hgb C 38.5 (H) 0.0 %    Hgb A2 Quant 2.7 0.7 - 3.1 %    Hgb Interp. Comment    T3, Free   Result Value Ref Range    T3, Free 2.6 2.0 - 4.4 pg/mL   TSH   Result Value Ref Range    TSH 0.170 (L) 0.460 - 4.680 mIU/mL   T4, Free   Result Value Ref Range    Free T4 1.09 0.78 - 2.19 ng/dL   Hemoglobin A1c   Result Value Ref Range    Hemoglobin A1C 5.7 (H) 4 - 5.6 %   Lipid Panel   Result Value Ref Range    Total Cholesterol 248 (H) 0 - 199 mg/dL    Triglycerides 153 20 - 199 mg/dL    HDL Cholesterol 79 60 - 200 mg/dL    LDL Cholesterol  114 1 - 129 mg/dL    LDL/HDL Ratio 1.75 0.00 - 3.22   Comprehensive Metabolic Panel   Result Value Ref Range    Glucose 107 (H) 60 - 100 mg/dL    BUN 15 7 - 21 mg/dL    Creatinine 1.08 (H) 0.50 - 1.00 mg/dL    Sodium 140 137 - 145 mmol/L    Potassium 3.8 3.5 - 5.1 mmol/L    Chloride 103 95 - 110 mmol/L    CO2 25.0 22.0 - 31.0 mmol/L    Calcium 10.0 8.4 - 10.2 mg/dL    Total Protein 8.8 (H) 6.3 - 8.6 g/dL    Albumin 4.30 3.40 - 4.80 g/dL    ALT (SGPT) 24 9 - 52 U/L    AST (SGOT) 13 (L) 14 - 36 U/L    Alkaline Phosphatase 111 38 - 126 U/L    Total Bilirubin 0.4 0.2 - 1.3 mg/dL    eGFR  African Amer 63 51 - 120 mL/min/1.73    Globulin 4.5 (H) 2.3 - 3.5 gm/dL    A/G Ratio 1.0 (L) 1.1 - 1.8 g/dL    BUN/Creatinine Ratio 13.9 7.0 - 25.0    Anion Gap 12.0 5.0 - 15.0 mmol/L       Some portions of this note have been dictated using voice recognition software and may contain errors and/or omissions.

## 2017-12-14 ENCOUNTER — TELEPHONE (OUTPATIENT)
Dept: FAMILY MEDICINE CLINIC | Facility: CLINIC | Age: 62
End: 2017-12-14

## 2017-12-14 NOTE — TELEPHONE ENCOUNTER
----- Message from Brown Lion MD sent at 12/14/2017  2:41 PM CST -----  Regarding: called pt  Called pt.  States her BP is around 120/60.  I discussed and told her that her BP is at goal. She has appt tomorrow. Thanks   ----- Message -----     From: Giovana Ross MA     Sent: 12/14/2017   2:35 PM       To: Brown Lion MD    PT CALLED STATING HER BP IS LOW,WOULD LIKE FOR YOU TO CALL HER. [phone]    THANKS

## 2017-12-15 ENCOUNTER — OFFICE VISIT (OUTPATIENT)
Dept: FAMILY MEDICINE CLINIC | Facility: CLINIC | Age: 62
End: 2017-12-15

## 2017-12-15 VITALS
HEART RATE: 60 BPM | BODY MASS INDEX: 39.07 KG/M2 | WEIGHT: 257.8 LBS | TEMPERATURE: 98.6 F | RESPIRATION RATE: 16 BRPM | HEIGHT: 68 IN | DIASTOLIC BLOOD PRESSURE: 84 MMHG | SYSTOLIC BLOOD PRESSURE: 120 MMHG

## 2017-12-15 DIAGNOSIS — I10 ESSENTIAL HYPERTENSION: ICD-10-CM

## 2017-12-15 DIAGNOSIS — K59.00 CONSTIPATION, UNSPECIFIED CONSTIPATION TYPE: ICD-10-CM

## 2017-12-15 DIAGNOSIS — Z86.74 HISTORY OF CARDIAC ARREST: ICD-10-CM

## 2017-12-15 DIAGNOSIS — R26.81 GAIT INSTABILITY: ICD-10-CM

## 2017-12-15 DIAGNOSIS — D50.9 IRON DEFICIENCY ANEMIA, UNSPECIFIED IRON DEFICIENCY ANEMIA TYPE: ICD-10-CM

## 2017-12-15 DIAGNOSIS — I63.9 CEREBROVASCULAR ACCIDENT (CVA), UNSPECIFIED MECHANISM (HCC): Primary | ICD-10-CM

## 2017-12-15 DIAGNOSIS — Z23 NEED FOR HEPATITIS VACCINATION: ICD-10-CM

## 2017-12-15 DIAGNOSIS — Z87.898 HISTORY OF BRAIN TUMOR: ICD-10-CM

## 2017-12-15 DIAGNOSIS — I82.622 DEEP VEIN THROMBOSIS (DVT) OF LEFT UPPER EXTREMITY, UNSPECIFIED CHRONICITY, UNSPECIFIED VEIN (HCC): ICD-10-CM

## 2017-12-15 DIAGNOSIS — R76.8 HEPATITIS C ANTIBODY POSITIVE IN BLOOD: ICD-10-CM

## 2017-12-15 DIAGNOSIS — K21.9 GASTROESOPHAGEAL REFLUX DISEASE, ESOPHAGITIS PRESENCE NOT SPECIFIED: ICD-10-CM

## 2017-12-15 DIAGNOSIS — E78.5 HYPERLIPIDEMIA, UNSPECIFIED HYPERLIPIDEMIA TYPE: ICD-10-CM

## 2017-12-15 PROCEDURE — 90472 IMMUNIZATION ADMIN EACH ADD: CPT | Performed by: FAMILY MEDICINE

## 2017-12-15 PROCEDURE — 99214 OFFICE O/P EST MOD 30 MIN: CPT | Performed by: FAMILY MEDICINE

## 2017-12-15 PROCEDURE — 90746 HEPB VACCINE 3 DOSE ADULT IM: CPT | Performed by: FAMILY MEDICINE

## 2017-12-15 PROCEDURE — 90632 HEPA VACCINE ADULT IM: CPT | Performed by: FAMILY MEDICINE

## 2017-12-15 PROCEDURE — 90471 IMMUNIZATION ADMIN: CPT | Performed by: FAMILY MEDICINE

## 2017-12-15 RX ORDER — ASPIRIN 325 MG
325 TABLET, DELAYED RELEASE (ENTERIC COATED) ORAL DAILY
Qty: 30 TABLET | Refills: 11 | Status: SHIPPED | OUTPATIENT
Start: 2017-12-15 | End: 2018-11-20 | Stop reason: SDUPTHER

## 2017-12-15 RX ORDER — PANTOPRAZOLE SODIUM 40 MG/1
40 TABLET, DELAYED RELEASE ORAL DAILY
Qty: 30 TABLET | Refills: 11 | Status: SHIPPED | OUTPATIENT
Start: 2017-12-15 | End: 2018-11-20 | Stop reason: SDUPTHER

## 2017-12-15 NOTE — PATIENT INSTRUCTIONS
Ask daughter if you have appt with Cardiologist.  If not will set you up with one.      Start back again on aspirin 325 mg daily     Start protonix 40 mg daily on empty stomach   Food Choices for Gastroesophageal Reflux    If you can't get medication try prilosec OTC.     Disease, Adult  When you have gastroesophageal reflux disease (GERD), the foods you eat and your eating habits are very important. Choosing the right foods can help ease the discomfort of GERD.  WHAT GENERAL GUIDELINES DO I NEED TO FOLLOW?  · Choose fruits, vegetables, whole grains, low-fat dairy products, and low-fat meat, fish, and poultry.  · Limit fats such as oils, salad dressings, butter, nuts, and avocado.  · Keep a food diary to identify foods that cause symptoms.  · Avoid foods that cause reflux. These may be different for different people.  · Eat frequent small meals instead of three large meals each day.  · Eat your meals slowly, in a relaxed setting.  · Limit fried foods.  · Cook foods using methods other than frying.  · Avoid drinking alcohol.  · Avoid drinking large amounts of liquids with your meals.  · Avoid bending over or lying down until 2-3 hours after eating.  WHAT FOODS ARE NOT RECOMMENDED?  The following are some foods and drinks that may worsen your symptoms:  Vegetables  Tomatoes. Tomato juice. Tomato and spaghetti sauce. Chili peppers. Onion and garlic. Horseradish.  Fruits  Oranges, grapefruit, and lemon (fruit and juice).  Meats  High-fat meats, fish, and poultry. This includes hot dogs, ribs, ham, sausage, salami, and paz.  Dairy  Whole milk and chocolate milk. Sour cream. Cream. Butter. Ice cream. Cream cheese.   Beverages  Coffee and tea, with or without caffeine. Carbonated beverages or energy drinks.  Condiments  Hot sauce. Barbecue sauce.   Sweets/Desserts  Chocolate and cocoa. Donuts. Peppermint and spearmint.  Fats and Oils  High-fat foods, including French fries and potato chips.  Other  Vinegar. Strong  spices, such as black pepper, white pepper, red pepper, cayenne, artis powder, cloves, janina, and chili powder.  The items listed above may not be a complete list of foods and beverages to avoid. Contact your dietitian for more information.     This information is not intended to replace advice given to you by your health care provider. Make sure you discuss any questions you have with your health care provider.     Document Released: 12/18/2006 Document Revised: 01/08/2016 Document Reviewed: 10/22/2014  [employer] Interactive Patient Education ©2017 [employer]    Gastroesophageal Reflux Disease, Adult  Normally, food travels down the esophagus and stays in the stomach to be digested. However, when a person has gastroesophageal reflux disease (GERD), food and stomach acid move back up into the esophagus. When this happens, the esophagus becomes sore and inflamed. Over time, GERD can create small holes (ulcers) in the lining of the esophagus.   CAUSES  This condition is caused by a problem with the muscle between the esophagus and the stomach (lower esophageal sphincter, or LES). Normally, the LES muscle closes after food passes through the esophagus to the stomach. When the LES is weakened or abnormal, it does not close properly, and that allows food and stomach acid to go back up into the esophagus. The LES can be weakened by certain dietary substances, medicines, and medical conditions, including:  · Tobacco use.  · Pregnancy.  · Having a hiatal hernia.  · Heavy alcohol use.  · Certain foods and beverages, such as coffee, chocolate, onions, and peppermint.  RISK FACTORS  This condition is more likely to develop in:  · People who have an increased body weight.  · People who have connective tissue disorders.  · People who use NSAID medicines.  SYMPTOMS  Symptoms of this condition include:  · Heartburn.  · Difficult or painful swallowing.  · The feeling of having a lump in the throat.  · A bitter taste in the  mouth.  · Bad breath.  · Having a large amount of saliva.  · Having an upset or bloated stomach.  · Belching.  · Chest pain.  · Shortness of breath or wheezing.  · Ongoing (chronic) cough or a night-time cough.  · Wearing away of tooth enamel.  · Weight loss.  Different conditions can cause chest pain. Make sure to see your health care provider if you experience chest pain.  DIAGNOSIS  Your health care provider will take a medical history and perform a physical exam. To determine if you have mild or severe GERD, your health care provider may also monitor how you respond to treatment. You may also have other tests, including:  · An endoscopy to examine your stomach and esophagus with a small camera.  · A test that measures the acidity level in your esophagus.  · A test that measures how much pressure is on your esophagus.  · A barium swallow or modified barium swallow to show the shape, size, and functioning of your esophagus.  TREATMENT  The goal of treatment is to help relieve your symptoms and to prevent complications. Treatment for this condition may vary depending on how severe your symptoms are. Your health care provider may recommend:  · Changes to your diet.  · Medicine.  · Surgery.  HOME CARE INSTRUCTIONS  Diet  · Follow a diet as recommended by your health care provider. This may involve avoiding foods and drinks such as:    Coffee and tea (with or without caffeine).    Drinks that contain alcohol.    Energy drinks and sports drinks.    Carbonated drinks or sodas.    Chocolate and cocoa.    Peppermint and mint flavorings.    Garlic and onions.    Horseradish.    Spicy and acidic foods, including peppers, chili powder, artis powder, vinegar, hot sauces, and barbecue sauce.    Citrus fruit juices and citrus fruits, such as oranges, brielle, and limes.    Tomato-based foods, such as red sauce, chili, salsa, and pizza with red sauce.    Fried and fatty foods, such as donuts, french fries, potato chips, and  high-fat dressings.    High-fat meats, such as hot dogs and fatty cuts of red and white meats, such as rib eye steak, sausage, ham, and paz.    High-fat dairy items, such as whole milk, butter, and cream cheese.  · Eat small, frequent meals instead of large meals.  · Avoid drinking large amounts of liquid with your meals.  · Avoid eating meals during the 2-3 hours before bedtime.  · Avoid lying down right after you eat.  · Do not exercise right after you eat.   General Instructions   · Pay attention to any changes in your symptoms.  · Take over-the-counter and prescription medicines only as told by your health care provider. Do not take aspirin, ibuprofen, or other NSAIDs unless your health care provider told you to do so.  · Do not use any tobacco products, including cigarettes, chewing tobacco, and e-cigarettes. If you need help quitting, ask your health care provider.  · Wear loose-fitting clothing. Do not wear anything tight around your waist that causes pressure on your abdomen.  · Raise (elevate) the head of your bed 6 inches (15cm).  · Try to reduce your stress, such as with yoga or meditation. If you need help reducing stress, ask your health care provider.  · If you are overweight, reduce your weight to an amount that is healthy for you. Ask your health care provider for guidance about a safe weight loss goal.  · Keep all follow-up visits as told by your health care provider. This is important.  SEEK MEDICAL CARE IF:  · You have new symptoms.  · You have unexplained weight loss.  · You have difficulty swallowing, or it hurts to swallow.  · You have wheezing or a persistent cough.  · Your symptoms do not improve with treatment.  · You have a hoarse voice.  SEEK IMMEDIATE MEDICAL CARE IF:  · You have pain in your arms, neck, jaw, teeth, or back.  · You feel sweaty, dizzy, or light-headed.  · You have chest pain or shortness of breath.  · You vomit and your vomit looks like blood or coffee grounds.  · You  faint.  · Your stool is bloody or black.  · You cannot swallow, drink, or eat.     This information is not intended to replace advice given to you by your health care provider. Make sure you discuss any questions you have with your health care provider.     Document Released: 09/27/2006 Document Revised: 09/07/2016 Document Reviewed: 04/13/2016  [employer] Interactive Patient Education ©2017 [employer]  Pantoprazole tablets  What is this medicine?  PANTOPRAZOLE (pan TOE pra zole) prevents the production of acid in the stomach. It is used to treat gastroesophageal reflux disease (GERD), inflammation of the esophagus, and Zollinger-Figueroa syndrome.  This medicine may be used for other purposes; ask your health care provider or pharmacist if you have questions.  COMMON BRAND NAME(S): Protonix  What should I tell my health care provider before I take this medicine?  They need to know if you have any of these conditions:  -liver disease  -low levels of magnesium in the blood  -lupus  -an unusual or allergic reaction to omeprazole, lansoprazole, pantoprazole, rabeprazole, other medicines, foods, dyes, or preservatives  -pregnant or trying to get pregnant  -breast-feeding  How should I use this medicine?  Take this medicine by mouth. Swallow the tablets whole with a drink of water. Follow the directions on the prescription label. Do not crush, break, or chew. Take your medicine at regular intervals. Do not take your medicine more often than directed.  Talk to your pediatrician regarding the use of this medicine in children. While this drug may be prescribed for children as young as 5 years for selected conditions, precautions do apply.  Overdosage: If you think you have taken too much of this medicine contact a poison control center or emergency room at once.  NOTE: This medicine is only for you. Do not share this medicine with others.  What if I miss a dose?  If you miss a dose, take it as soon as you can. If it is  almost time for your next dose, take only that dose. Do not take double or extra doses.  What may interact with this medicine?  Do not take this medicine with any of the following medications:  -atazanavir  -nelfinavir  This medicine may also interact with the following medications:  -ampicillin  -delavirdine  -erlotinib  -iron salts  -medicines for fungal infections like ketoconazole, itraconazole and voriconazole  -methotrexate  -mycophenolate mofetil  -warfarin  This list may not describe all possible interactions. Give your health care provider a list of all the medicines, herbs, non-prescription drugs, or dietary supplements you use. Also tell them if you smoke, drink alcohol, or use illegal drugs. Some items may interact with your medicine.  What should I watch for while using this medicine?  It can take several days before your stomach pain gets better. Check with your doctor or health care professional if your condition does not start to get better, or if it gets worse.  You may need blood work done while you are taking this medicine.  What side effects may I notice from receiving this medicine?  Side effects that you should report to your doctor or health care professional as soon as possible:  -allergic reactions like skin rash, itching or hives, swelling of the face, lips, or tongue  -bone, muscle or joint pain  -breathing problems  -chest pain or chest tightness  -dark yellow or brown urine  -dizziness  -fast, irregular heartbeat  -feeling faint or lightheaded  -fever or sore throat  -muscle spasm  -palpitations  -rash on cheeks or arms that gets worse in the sun  -redness, blistering, peeling or loosening of the skin, including inside the mouth  -seizures  -tremors  -unusual bleeding or bruising  -unusually weak or tired  -yellowing of the eyes or skin  Side effects that usually do not require medical attention (report to your doctor or health care professional if they continue or are  bothersome):  -constipation  -diarrhea  -dry mouth  -headache  -nausea  This list may not describe all possible side effects. Call your doctor for medical advice about side effects. You may report side effects to FDA at 3-904-DSX-9173.  Where should I keep my medicine?  Keep out of the reach of children.  Store at room temperature between 15 and 30 degrees C (59 and 86 degrees F). Protect from light and moisture. Throw away any unused medicine after the expiration date.  NOTE: This sheet is a summary. It may not cover all possible information. If you have questions about this medicine, talk to your doctor, pharmacist, or health care provider.     © 2017, [employer]. (2017-01-19 12:20:19)

## 2017-12-15 NOTE — PROGRESS NOTES
Subjective   Nathaly DELVIN Licea is a 62 y.o. female.     Problem List  1. Essential Hypertension   2. Morbid Obesity   3. H/O Brain Tumor/Meningioma   4. GERD  5. Constipation  6. Chronic back pain, DDD of spine  7. Hep C antibody positive/chronic hepatitis C  8.  Vitamin D deficiency   9.  Microcytic Anemia. Hemoglobin C. Iron deficiency anemia  10. Low TSH/subclinical hyperthyroidism   11. H/O CVA. Restricted diffusion in parietal supraventricular white matter bilaterally more on left than the  Right.    12. Hyperlipidemia ASCVD >5%  13. Hemiparesis on right side   14. Depression  15. Proximal LUE DVT      PSHX  1. H/O Brain Tumors sp Surgical Removal 2017 at [facility]   2. Right ankle surgery in   3. Emergency C section 40 years ago       SocialHx  -tobacco smoker.  Has been smoking for 10 years  Smokes once a day   -occasional alcohol drinker   -No illicit drugs. 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 Is here for recheck Pt has history of Left CVA and brain surgery. Since her CVA she has been having issues with right hemiparesis and speech issues.  She currently takes aspirin 81 mg PO q daily along with lipitor 20 mg PO qhs for hyperlipidemia. For hypertension pt is on lisinopril-HCTZ 20-25 mg PO BID along with norvasc 5 mg PO q daily.  For iron deficiency pt is taking iron pill 325 mg PO q daily.  On last visit pt was started on linzess 72 mcg PO q daily for constipation that has improved since taking it.  Pt missed appt to get pap smear done.  Also has yet to get colonoscopy screening. Pt is also positive for Hep C.   Pt recently saw Neurologist who started pt on aspirin 325 mg PO q daily from 81 mg daily and also plavix 75 mg PO q  daily. Pt also had lipitor increased from 20 mg to 80 mg PO qhs.   Pt also was ordered PT/OT but needs a new order since it was ordered in [place] fo She has gait instability and speech issues.   She continues to take iron pill for iron deficiency anemia. Pt also has been having issues with depression since her CVA and surgery. She has lost interest in the things she likes to do for several weeks.   She denies any suicidal or homicidal ideations.  She is not on medication currently.  Pt's hypertension is better controlled with addition of norvasc 5 mg PO q daily. Pt continues to take lisinopril-HCTZ 20/25 mg PO BID. She is compliant .    Pt was recently admitted to [facility] after presenting to ED with  AMS and subsequent PEA arrest. Pt had CPR performed.   Her hemodynamic improved with IV antbiotics, fluids and pressure.  Septic workup was negative.  An EKG and echocardiogram was done that showed structural or electrical heart disease . PT/OT evaluted pt and pt was discharged on 11/21/17. Neurologist was consulted and had low suspicion for actue stroke causing her symptoms.  Pt also was found to have an upper extremity DVT that was line associated.  A duplex US of LUE was done.  It was not proximal enough to warrant systemic anticogulation. It was recommended pt get outapatient sleep study and see PT/OT regarding gait issues. She is wearing an outpt Holter monitor and she is to follow up with Neurologist and Cardiologist soon. Pt reports no chest pain or shortness of breath today.  She denies any headaches.  She has no home health set up yet.  Pt is a high fall risk     Pt has seen Gastroenterologist  And colonoscopy is on hold now. . She also has appt with Sleep Medicine regarding sleep study with Dr. Ybarra.  For pap smear pt has appt to see DAREK Parra     Pt has finished PT/OT at [facility] [place]. She currently goes to Speech Therapy still    Pt also has chronic hepatitis C/HEP C antibody positive.  She  has yet to get Hep A and B vaccination    Blood pressure lately has been at goal.  She continues to take lisinopril-HCTZ 20-25 mg daily along with novasc    Pt has stopped smoking. She is using nicorette gum    Pt recently had her aspirin stopped by Home Health.  Will restart again on aspirin 325 mg daily     Pt has been having reflux issues and heartburn. She has yet to get EGD. That is currently on hold. It occurs everyday .      Cerebrovascular Accident   This is a new problem. The current episode started more than 1 month ago. The problem occurs daily. The problem has been waxing and waning. Associated symptoms include fatigue. Pertinent negatives include no abdominal pain, anorexia, arthralgias, change in bowel habit, chest pain, chills, congestion, coughing, diaphoresis, fever, headaches, joint swelling, myalgias, nausea, neck pain, numbness, rash, sore throat, swollen glands, urinary symptoms, vertigo, visual change, vomiting or weakness. Nothing aggravates the symptoms. She has tried nothing for the symptoms. The treatment provided no relief.   Extremity Weakness    The pain is present in the right wrist, right hand, right fingers, right arm, right shoulder, right elbow, right upper leg, right knee, right lower leg, right ankle, right foot and right toes. This is a new problem. The current episode started more than 1 month ago. There has been no history of extremity trauma. The problem occurs daily. The problem has been waxing and waning. The pain is at a severity of 3/10. The pain is mild. Pertinent negatives include no fever, inability to bear weight, itching, joint locking, joint swelling, limited range of motion, numbness, stiffness or tingling. She has tried nothing for the symptoms. The treatment provided no relief. Family history does not include gout or rheumatoid arthritis. There is no history of diabetes, gout, osteoarthritis or rheumatoid arthritis.   Depression   Visit Type: initial  Onset of  symptoms: at an unknown time  Progression since onset: gradually worsening  Patient presents with the following symptoms: anhedonia, decreased concentration, depressed mood and fatigue.  Patient is not experiencing: chest pain, choking sensation, compulsions, confusion, dizziness, dry mouth, excessive worry, feelings of hopelessness, feelings of worthlessness, hypersomnia, hyperventilation, impotence, insomnia, irritability, malaise, memory impairment, muscle tension, nausea, nervousness/anxiety, obsessions, palpitations, panic, psychomotor agitation, psychomotor retardation, restlessness, shortness of breath, suicidal ideas, suicidal planning, thoughts of death, weight gain and weight loss.  Frequency of symptoms: constantly   Severity: moderate   Risk factors: stroke.  Patient has a history of: depression  No history of: anemia, anxiety/panic attacks, arrhythmia, asthma, bipolar disorder, CAD, CHF, chronic lung disease, fibromyalgia, hyperthyroidism, suicide attempt, mental illness and substance abuse  Treatment tried: nothing  Compliance with treatment: variable         The following portions of the patient's history were reviewed and updated as appropriate: allergies, current medications, past family history, past medical history, past social history, past surgical history and problem list.    Review of Systems   Constitutional: Positive for fatigue. Negative for chills, diaphoresis, fever, irritability, weight gain and weight loss.   HENT: Positive for voice change. Negative for congestion and sore throat.    Eyes: Negative.    Respiratory: Negative.  Negative for cough, choking and shortness of breath.    Cardiovascular: Negative.  Negative for chest pain and palpitations.   Gastrointestinal: Positive for constipation. Negative for abdominal pain, anorexia, change in bowel habit, diarrhea, nausea, rectal pain and vomiting.   Endocrine: Negative.    Genitourinary: Negative.  Negative for dysuria, impotence and  "pelvic pain.   Musculoskeletal: Positive for back pain, extremity weakness and gait problem. Negative for arthralgias, gout, joint swelling, myalgias, neck pain and stiffness.   Skin: Negative.  Negative for itching and rash.   Allergic/Immunologic: Negative.    Neurological: Negative for vertigo, tingling, weakness, numbness and headaches.   Hematological: Negative.    Psychiatric/Behavioral: Positive for decreased concentration. Negative for confusion, substance abuse and suicidal ideas. The patient is not nervous/anxious and does not have insomnia.        Objective    /84  Pulse 60  Temp 98.6 °F (37 °C)  Resp 16  Ht 171.5 cm (67.5\")  Wt 117 kg (257 lb 12.8 oz)  BMI 39.78 kg/m2        Chemistry        Component Value Date/Time     08/18/2017 1341    K 3.8 08/18/2017 1341     08/18/2017 1341    CO2 25.0 08/18/2017 1341    BUN 15 08/18/2017 1341    CREATININE 1.08 (H) 08/18/2017 1341        Component Value Date/Time    CALCIUM 10.0 08/18/2017 1341    ALKPHOS 111 08/18/2017 1341    AST 13 (L) 08/18/2017 1341    ALT 24 08/18/2017 1341    BILITOT 0.4 08/18/2017 1341        Lab Results   Component Value Date    WBC 6.45 08/18/2017    HGB 11.6 (L) 08/18/2017    HCT 32.9 (L) 09/20/2017    MCV 78.5 (L) 08/18/2017     08/18/2017     Lab Results   Component Value Date    CHOL 248 (H) 08/18/2017    TRIG 153 08/18/2017    HDL 79 08/18/2017    LDLDIRECT 114 08/18/2017     Lab Results   Component Value Date    HGBA1C 5.7 (H) 08/18/2017     Lab Results   Component Value Date    TSH 0.540 09/20/2017     Office Visit on 09/11/2017   Component Date Value Ref Range Status   • Hepatitis C Ab 09/20/2017 Reactive* Negative Final   • Please note 09/20/2017 Comment   Final    This test was developed and its performance characteristics determined  by [facility].  It has not been cleared or approved by the U.S. Food and  Drug Administration.  The FDA has determined that such clearance or approval is " not  necessary. This test is used for clinical purposes.  It should not be  regarded as investigational or for research.   • Hepatitis C Genotype 09/20/2017 Comment   Final    Specimen has insufficient hepatitis C virus RNA to obtain genotyping  results. This genotyping assay should only be used for known HCV  positive patients with HCV RNA levels above 1000 IU/mL.   • Hepatitis C Quantitation 09/20/2017 HCV Not Detected  IU/mL Final   • Test Information 09/20/2017 Comment   Final    The quantitative range of this assay is 15 IU/mL to 100 million IU/mL.   • Performed by: 09/20/2017 Dariel Linn M.D.   Final   • Pathologist Interpretation 09/20/2017    Final                    Value:Red cells are normocytic/normochromic with mild anisocytosis.  Leukocyte count is normal, my differential count:  Neutrophils 45%, bands 1%, lymphocytes 45%, reactive lymphocytes 2%, eosinophils 4%, monocytes 3%.  Occasional giant platelets seen.  Serum studies show no evidence of nutritional deficiency.    Impression:  Anemia, borderline, with inadequate reticulocyte response.  Giant platelets seen.   • Reticulocyte % 09/20/2017 0.86  0.63 - 2.13 % Final   • Reticulocyte Absolute 09/20/2017 0.0368  0.0250 - 0.1250 10*6/mm3 Final   • Immature Reticulocyte Fraction 09/20/2017 11.3  3.0 - 15.9 % Final   • Reticulocyte Production Index 09/20/2017 0.39* 0.63 - 2.13 % Final   • Hematocrit 09/20/2017 32.9* 35.0 - 45.0 % Final   • Reticulocyte Hgb 09/20/2017 31.7  30.0 - 38.0 pg Final   • TSH 09/20/2017 0.540  0.460 - 4.680 mIU/mL Final   • Free T4 09/20/2017 1.33  0.78 - 2.19 ng/dL Final   • T3, Free 09/20/2017 2.4  2.0 - 4.4 pg/mL Final   • Ferritin 09/20/2017 129.00  11.10 - 264.00 ng/mL Final   • Folate 09/20/2017 8.11  2.76 - 21.00 ng/mL Final   • Vitamin B-12 09/20/2017 499  239 - 931 pg/mL Final   • Iron 09/20/2017 33* 37 - 170 mcg/dL Final   • TIBC 09/20/2017 304  265 - 497 mcg/dL Final   • Iron Saturation 09/20/2017 11* 15 - 50 %  Final   • Fecal Occult Blood 09/22/2017 Negative  Negative Final       Physical Exam   Constitutional: She is oriented to person, place, and time. She appears well-developed and well-nourished. No distress.   Pt has speech issues    HENT:   Head: Normocephalic and atraumatic.   Right Ear: External ear normal.   Left Ear: External ear normal.   Eyes: Conjunctivae and EOM are normal. Pupils are equal, round, and reactive to light. Right eye exhibits no discharge. Left eye exhibits no discharge. No scleral icterus.   Neck: Normal range of motion. Neck supple. No JVD present. No tracheal deviation present. No thyromegaly present.   Cardiovascular: Normal rate, regular rhythm and normal heart sounds.    Pulmonary/Chest: Effort normal and breath sounds normal. No stridor. No respiratory distress. She has no wheezes.   Abdominal: Soft. She exhibits no distension and no mass. There is no tenderness. There is no rebound and no guarding. No hernia.   Musculoskeletal: She exhibits tenderness. She exhibits no edema or deformity.        Lumbar back: She exhibits decreased range of motion, tenderness, bony tenderness, pain and spasm.   Motor strength 4/5 in upper and lower right extremity     Gait instability    Pt is high fall risk  Get up and go test >10 seconds     Lymphadenopathy:     She has no cervical adenopathy.   Neurological: She is alert and oriented to person, place, and time. She has normal reflexes. No cranial nerve deficit. Coordination normal.   Skin: Skin is warm and dry. No rash noted. She is not diaphoretic. No erythema. No pallor.   Psychiatric: She has a normal mood and affect. Her behavior is normal.   Nursing note and vitals reviewed.      Assessment/Plan   Problems Addressed this Visit        Cardiovascular and Mediastinum    Cerebrovascular accident (CVA) - Primary    Hyperlipidemia    Essential hypertension    Deep vein thrombosis (DVT) of left upper extremity       Digestive    Constipation     Gastroesophageal reflux disease       Nervous and Auditory    History of brain tumor       Hematopoietic and Hemostatic    Iron deficiency anemia       Other    Hepatitis C antibody positive in blood    Gait instability    Need for hepatitis vaccination    Relevant Orders    Hepatitis B Vaccine Adult IM    Hepatitis A Vaccine Adult IM    History of cardiac arrest        - have records from [place] regarding pt's course of care. Will refer pt to Sleep study for CA and sleep study with Dr. Ybarra. Consultation appreciated  -GERD - start on protonix 40 mg daily. Gave food choices for GERD to go home with . Gave drug information to go home with. Pt may need EGD in near future  -cardiac arrest - pt wearing outpt Holter Monitor. She is to follow with Cardiologist in [place] soon. Recent echocardiogram showed no structural heart disease   - tobacco user- counseled pt to stop smoking. Counseled >5 minutes   - DVT of LUE - anticoagulation not indicated at this time. Pt advised to go to ER or call 911 if developing chest pain, shortness of breath or dizziness  - for history of CVA /gait instability/slurring of speech- referred  to [facility] for  PT/OT for gait instability and speech therapy. Consultation appreciated. Refilled lipitor 80 mg PO qhs. plavix 75 mg PO q daily and aspirin 325 mg PO q daily.   Will also get US carotid bilateral to rule out any stenosis.  Home Health for , medication management basic nursing and neurovascular and cardiology monitoring  - for hyperlipidemia - lipitor 80 mg PO qhs   - depression - continue on celexa 10 mg PO q daily.  Drug information given. Recommended pt to go see counseling but pt declined at this time  - for constipation - better with linzess 72 mcg PO q daily. Advised pt to go see Gastroenterologist for colonoscopy screening and Hep C antibody positive  - hypertension - BP at goal today. Continue with antihypertensive medications. Continue on  lisionpril-HCTZ and norvasc  - pt has appt for pap smear, colonoscopy and mammogram soon   - HEP C antibody positive. 1st Hep A and B vaccination today  -  Pt is former tobacco user - 1 800 QUIT NOW.  She has cut down to 1 cigarette a day.  Continue with nicorette patch and gum   - recheck in 1 month . Next Hep B vaccination in 1 month

## 2018-01-26 ENCOUNTER — OFFICE VISIT (OUTPATIENT)
Dept: FAMILY MEDICINE CLINIC | Facility: CLINIC | Age: 63
End: 2018-01-26

## 2018-01-26 VITALS
HEART RATE: 96 BPM | DIASTOLIC BLOOD PRESSURE: 100 MMHG | SYSTOLIC BLOOD PRESSURE: 180 MMHG | WEIGHT: 246.8 LBS | RESPIRATION RATE: 16 BRPM | TEMPERATURE: 98.6 F | BODY MASS INDEX: 37.41 KG/M2 | HEIGHT: 68 IN

## 2018-01-26 DIAGNOSIS — R73.03 PREDIABETES: ICD-10-CM

## 2018-01-26 DIAGNOSIS — E78.5 HYPERLIPIDEMIA, UNSPECIFIED HYPERLIPIDEMIA TYPE: ICD-10-CM

## 2018-01-26 DIAGNOSIS — Z23 NEED FOR HEPATITIS VACCINATION: ICD-10-CM

## 2018-01-26 DIAGNOSIS — K21.9 GASTROESOPHAGEAL REFLUX DISEASE, ESOPHAGITIS PRESENCE NOT SPECIFIED: ICD-10-CM

## 2018-01-26 DIAGNOSIS — I82.622 DEEP VEIN THROMBOSIS (DVT) OF LEFT UPPER EXTREMITY, UNSPECIFIED CHRONICITY, UNSPECIFIED VEIN (HCC): ICD-10-CM

## 2018-01-26 DIAGNOSIS — E55.9 VITAMIN D DEFICIENCY: ICD-10-CM

## 2018-01-26 DIAGNOSIS — R26.81 GAIT INSTABILITY: ICD-10-CM

## 2018-01-26 DIAGNOSIS — D50.9 IRON DEFICIENCY ANEMIA, UNSPECIFIED IRON DEFICIENCY ANEMIA TYPE: ICD-10-CM

## 2018-01-26 DIAGNOSIS — I10 ESSENTIAL HYPERTENSION: ICD-10-CM

## 2018-01-26 DIAGNOSIS — Z72.0 TOBACCO USER: ICD-10-CM

## 2018-01-26 DIAGNOSIS — Z86.73 HISTORY OF CVA (CEREBROVASCULAR ACCIDENT): ICD-10-CM

## 2018-01-26 DIAGNOSIS — Z12.39 ENCOUNTER FOR SCREENING FOR MALIGNANT NEOPLASM OF BREAST: ICD-10-CM

## 2018-01-26 DIAGNOSIS — I63.9 CEREBROVASCULAR ACCIDENT (CVA), UNSPECIFIED MECHANISM (HCC): ICD-10-CM

## 2018-01-26 DIAGNOSIS — Z86.74 HISTORY OF CARDIAC ARREST: Primary | ICD-10-CM

## 2018-01-26 DIAGNOSIS — G81.91 RIGHT HEMIPARESIS (HCC): ICD-10-CM

## 2018-01-26 DIAGNOSIS — F32.A DEPRESSION, UNSPECIFIED DEPRESSION TYPE: ICD-10-CM

## 2018-01-26 DIAGNOSIS — K59.00 CONSTIPATION, UNSPECIFIED CONSTIPATION TYPE: ICD-10-CM

## 2018-01-26 PROCEDURE — 90471 IMMUNIZATION ADMIN: CPT | Performed by: FAMILY MEDICINE

## 2018-01-26 PROCEDURE — 99214 OFFICE O/P EST MOD 30 MIN: CPT | Performed by: FAMILY MEDICINE

## 2018-01-26 PROCEDURE — 90746 HEPB VACCINE 3 DOSE ADULT IM: CPT | Performed by: FAMILY MEDICINE

## 2018-01-26 RX ORDER — AMLODIPINE BESYLATE 5 MG/1
5 TABLET ORAL DAILY
Refills: 1 | COMMUNITY
Start: 2018-01-04 | End: 2018-11-20 | Stop reason: SDUPTHER

## 2018-01-26 NOTE — PROGRESS NOTES
Subjective   Molinaeduardo Licea is a 62 y.o. female.     Problem List  1. Essential Hypertension   2. Morbid Obesity  BMI >40  3. H/O Brain Tumor/Meningioma   4. GERD  5. Constipation  6. Chronic back pain, DDD of spine  7. Hep C antibody positive/chronic hepatitis C  8.  Vitamin D deficiency   9.  Microcytic Anemia. Hemoglobin C. Iron deficiency anemia  10. Low TSH/subclinical hyperthyroidism   11. H/O CVA. Restricted diffusion in parietal supraventricular white matter bilaterally more on left than the  Right.    12. Hyperlipidemia ASCVD >5%  13. Hemiparesis on right side   14. Depression  15. Proximal LUE DVT  16. Prediabetes       PSHX  1. H/O Brain Tumors sp Surgical Removal 2017 at [facility]   2. Right ankle surgery in   3. Emergency C section 40 years ago       SocialHx  -tobacco smoker.  Has been smoking for 10 years  Smokes once a day   -occasional alcohol drinker   -No illicit drugs. 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 Is here for recheck Pt has history of Left CVA and brain surgery. Since her CVA she has been having issues with right hemiparesis and speech issues.  She currently takes aspirin 81 mg PO q daily along with lipitor 20 mg PO qhs for hyperlipidemia. For hypertension pt is on lisinopril-HCTZ 20-25 mg PO BID along with norvasc 5 mg PO q daily.  For iron deficiency pt is taking iron pill 325 mg PO q daily.  On last visit pt was started on linzess 72 mcg PO q daily for constipation that has improved since taking it.  Pt missed appt to get pap smear done.  Also has yet to get colonoscopy screening. Pt is also positive for Hep C.   Pt recently saw Neurologist who started pt on aspirin 325 mg PO q daily from 81 mg daily  and also plavix 75 mg PO q daily. Pt also had lipitor increased from 20 mg to 80 mg PO qhs.   Pt also was ordered PT/OT but needs a new order since it was ordered in [place] fo She has gait instability and speech issues.   She continues to take iron pill for iron deficiency anemia. Pt also has been having issues with depression since her CVA and surgery. She has lost interest in the things she likes to do for several weeks.   She denies any suicidal or homicidal ideations.  She is not on medication currently.  Pt's hypertension is better controlled with addition of norvasc 5 mg PO q daily. Pt continues to take lisinopril-HCTZ 20/25 mg PO BID. She is compliant .    Pt was recently admitted to [facility] after presenting to ED with  AMS and subsequent PEA arrest. Pt had CPR performed.   Her hemodynamic improved with IV antbiotics, fluids and pressure.  Septic workup was negative.  An EKG and echocardiogram was done that showed structural or electrical heart disease . PT/OT evaluted pt and pt was discharged on 11/21/17. Neurologist was consulted and had low suspicion for actue stroke causing her symptoms.  Pt also was found to have an upper extremity DVT that was line associated.  A duplex US of LUE was done.  It was not proximal enough to warrant systemic anticogulation. It was recommended pt get outapatient sleep study and see PT/OT regarding gait issues. She is wearing an outpt Holter monitor and she is to follow up with Neurologist and Cardiologist soon. Pt reports no chest pain or shortness of breath today.  She denies any headaches.  She has no home health set up yet.  Pt is a high fall risk     Pt has seen Gastroenterologist  And colonoscopy is on hold now.  She does have an appt soon with [facility] . She also has appt with Sleep Medicine regarding sleep study with Dr. Ybarra.  For pap smear pt had appt to see DARKE Parra but she canceled appt     Pt has [facility] at [facility]. She  currently goes to Speech Therapy still    Pt also has chronic hepatitis C/HEP C antibody positive.  She is due for 2nd Hep B vaccination today     Blood pressure lately has  No been at goal.  She continues to take lisinopril-HCTZ 20-25 mg daily along with novasc 5 mg PO q daily.  Her BP is >180/100 today.  She has been feeling fatigued lately  She has not been feeling well.  She is not feverish     Pt has stopped smoking. She is using nicorette gum    Pt recently had her aspirin stopped by Home Health.  Will restart again on aspirin 325 mg daily     Pt has been having reflux issues and heartburn. She has yet to get EGD. That is currently on hold. It occurs everyday . She is on protonix  40 mg PO q daily.      Cerebrovascular Accident   This is a new problem. The current episode started more than 1 month ago. The problem occurs daily. The problem has been waxing and waning. Associated symptoms include fatigue. Pertinent negatives include no abdominal pain, anorexia, arthralgias, change in bowel habit, chest pain, chills, congestion, coughing, diaphoresis, fever, headaches, joint swelling, myalgias, nausea, neck pain, numbness, rash, sore throat, swollen glands, urinary symptoms, vertigo, visual change, vomiting or weakness. Nothing aggravates the symptoms. She has tried nothing for the symptoms. The treatment provided no relief.   Extremity Weakness    The pain is present in the right wrist, right hand, right fingers, right arm, right shoulder, right elbow, right upper leg, right knee, right lower leg, right ankle, right foot and right toes. This is a new problem. The current episode started more than 1 month ago. There has been no history of extremity trauma. The problem occurs daily. The problem has been waxing and waning. The pain is at a severity of 3/10. The pain is mild. Pertinent negatives include no fever, inability to bear weight, itching, joint locking, joint swelling, limited range of motion, numbness,  stiffness or tingling. She has tried nothing for the symptoms. The treatment provided no relief. Family history does not include gout or rheumatoid arthritis. There is no history of diabetes, gout, osteoarthritis or rheumatoid arthritis.   Depression   Visit Type: initial  Onset of symptoms: at an unknown time  Progression since onset: gradually worsening  Patient presents with the following symptoms: anhedonia, decreased concentration, depressed mood and fatigue.  Patient is not experiencing: chest pain, choking sensation, compulsions, confusion, dizziness, dry mouth, excessive worry, feelings of hopelessness, feelings of worthlessness, hypersomnia, hyperventilation, impotence, insomnia, irritability, malaise, memory impairment, muscle tension, nausea, nervousness/anxiety, obsessions, palpitations, panic, psychomotor agitation, psychomotor retardation, restlessness, shortness of breath, suicidal ideas, suicidal planning, thoughts of death, weight gain and weight loss.  Frequency of symptoms: constantly   Severity: moderate   Risk factors: stroke.  Patient has a history of: depression  No history of: anemia, anxiety/panic attacks, arrhythmia, asthma, bipolar disorder, CAD, CHF, chronic lung disease, fibromyalgia, hyperthyroidism, suicide attempt, mental illness and substance abuse  Treatment tried: nothing  Compliance with treatment: variable         The following portions of the patient's history were reviewed and updated as appropriate: allergies, current medications, past family history, past medical history, past social history, past surgical history and problem list.    Review of Systems   Constitutional: Positive for fatigue. Negative for chills, diaphoresis, fever, irritability, weight gain and weight loss.   HENT: Positive for voice change. Negative for congestion and sore throat.    Eyes: Negative.    Respiratory: Negative.  Negative for cough, choking and shortness of breath.    Cardiovascular: Negative.   "Negative for chest pain and palpitations.   Gastrointestinal: Positive for constipation. Negative for abdominal pain, anorexia, change in bowel habit, diarrhea, nausea, rectal pain and vomiting.   Endocrine: Negative.    Genitourinary: Negative.  Negative for dysuria, impotence and pelvic pain.   Musculoskeletal: Positive for back pain, extremity weakness and gait problem. Negative for arthralgias, gout, joint swelling, myalgias, neck pain and stiffness.   Skin: Negative.  Negative for itching and rash.   Allergic/Immunologic: Negative.    Neurological: Negative for vertigo, tingling, weakness, numbness and headaches.   Hematological: Negative.    Psychiatric/Behavioral: Positive for decreased concentration. Negative for confusion, substance abuse and suicidal ideas. The patient is not nervous/anxious and does not have insomnia.        Objective    /100  Pulse 96  Temp 98.6 °F (37 °C)  Resp 16  Ht 171.5 cm (67.5\")  Wt 112 kg (246 lb 12.8 oz)  BMI 38.08 kg/m2    Temp 98.6 °F (37 °C)  Resp 16  Ht 171.5 cm (67.5\")  Wt 112 kg (246 lb 12.8 oz)  BMI 38.08 kg/m2        Chemistry        Component Value Date/Time     08/18/2017 1341    K 3.8 08/18/2017 1341     08/18/2017 1341    CO2 25.0 08/18/2017 1341    BUN 15 08/18/2017 1341    CREATININE 1.08 (H) 08/18/2017 1341        Component Value Date/Time    CALCIUM 10.0 08/18/2017 1341    ALKPHOS 111 08/18/2017 1341    AST 13 (L) 08/18/2017 1341    ALT 24 08/18/2017 1341    BILITOT 0.4 08/18/2017 1341        Lab Results   Component Value Date    WBC 6.45 08/18/2017    HGB 11.6 (L) 08/18/2017    HCT 32.9 (L) 09/20/2017    MCV 78.5 (L) 08/18/2017     08/18/2017     Lab Results   Component Value Date    CHOL 248 (H) 08/18/2017    TRIG 153 08/18/2017    HDL 79 08/18/2017    LDLDIRECT 114 08/18/2017     Lab Results   Component Value Date    HGBA1C 5.7 (H) 08/18/2017     Lab Results   Component Value Date    TSH 0.540 09/20/2017     Office Visit on " 09/11/2017   Component Date Value Ref Range Status   • Hepatitis C Ab 09/20/2017 Reactive* Negative Final   • Please note 09/20/2017 Comment   Final    This test was developed and its performance characteristics determined  by [facility].  It has not been cleared or approved by the U.S. Food and  Drug Administration.  The FDA has determined that such clearance or approval is not  necessary. This test is used for clinical purposes.  It should not be  regarded as investigational or for research.   • Hepatitis C Genotype 09/20/2017 Comment   Final    Specimen has insufficient hepatitis C virus RNA to obtain genotyping  results. This genotyping assay should only be used for known HCV  positive patients with HCV RNA levels above 1000 IU/mL.   • Hepatitis C Quantitation 09/20/2017 HCV Not Detected  IU/mL Final   • Test Information 09/20/2017 Comment   Final    The quantitative range of this assay is 15 IU/mL to 100 million IU/mL.   • Performed by: 09/20/2017 Dariel Linn M.D.   Final   • Pathologist Interpretation 09/20/2017    Final                    Value:Red cells are normocytic/normochromic with mild anisocytosis.  Leukocyte count is normal, my differential count:  Neutrophils 45%, bands 1%, lymphocytes 45%, reactive lymphocytes 2%, eosinophils 4%, monocytes 3%.  Occasional giant platelets seen.  Serum studies show no evidence of nutritional deficiency.    Impression:  Anemia, borderline, with inadequate reticulocyte response.  Giant platelets seen.   • Reticulocyte % 09/20/2017 0.86  0.63 - 2.13 % Final   • Reticulocyte Absolute 09/20/2017 0.0368  0.0250 - 0.1250 10*6/mm3 Final   • Immature Reticulocyte Fraction 09/20/2017 11.3  3.0 - 15.9 % Final   • Reticulocyte Production Index 09/20/2017 0.39* 0.63 - 2.13 % Final   • Hematocrit 09/20/2017 32.9* 35.0 - 45.0 % Final   • Reticulocyte Hgb 09/20/2017 31.7  30.0 - 38.0 pg Final   • TSH 09/20/2017 0.540  0.460 - 4.680 mIU/mL Final   • Free T4 09/20/2017 1.33  0.78 -  2.19 ng/dL Final   • T3, Free 09/20/2017 2.4  2.0 - 4.4 pg/mL Final   • Ferritin 09/20/2017 129.00  11.10 - 264.00 ng/mL Final   • Folate 09/20/2017 8.11  2.76 - 21.00 ng/mL Final   • Vitamin B-12 09/20/2017 499  239 - 931 pg/mL Final   • Iron 09/20/2017 33* 37 - 170 mcg/dL Final   • TIBC 09/20/2017 304  265 - 497 mcg/dL Final   • Iron Saturation 09/20/2017 11* 15 - 50 % Final   • Fecal Occult Blood 09/22/2017 Negative  Negative Final       Physical Exam   Constitutional: She is oriented to person, place, and time. She appears well-developed and well-nourished. No distress.   Pt has speech issues    HENT:   Head: Normocephalic and atraumatic.   Right Ear: External ear normal.   Left Ear: External ear normal.   Eyes: Conjunctivae and EOM are normal. Pupils are equal, round, and reactive to light. Right eye exhibits no discharge. Left eye exhibits no discharge. No scleral icterus.   Neck: Normal range of motion. Neck supple. No JVD present. No tracheal deviation present. No thyromegaly present.   Cardiovascular: Normal rate, regular rhythm and normal heart sounds.    Pulmonary/Chest: Effort normal and breath sounds normal. No stridor. No respiratory distress. She has no wheezes.   Abdominal: Soft. She exhibits no distension and no mass. There is no tenderness. There is no rebound and no guarding. No hernia.   Musculoskeletal: She exhibits tenderness. She exhibits no edema or deformity.        Lumbar back: She exhibits decreased range of motion, tenderness, bony tenderness, pain and spasm.   Motor strength 4/5 in upper and lower right extremity     Gait instability    Pt is high fall risk  Get up and go test >10 seconds     Lymphadenopathy:     She has no cervical adenopathy.   Neurological: She is alert and oriented to person, place, and time. She has normal reflexes. No cranial nerve deficit. Coordination normal.   Skin: Skin is warm and dry. No rash noted. She is not diaphoretic. No erythema. No pallor.    Psychiatric: She has a normal mood and affect. Her behavior is normal.   Nursing note and vitals reviewed.      Assessment/Plan   Problems Addressed this Visit        Cardiovascular and Mediastinum    Cerebrovascular accident (CVA) - Primary    Hyperlipidemia    Relevant Orders    CBC Auto Differential    Comprehensive Metabolic Panel    Hemoglobin A1c    Lipid Panel    Vitamin D 25 Hydroxy    Ferritin    Iron Profile    Essential hypertension    Relevant Medications    amLODIPine (NORVASC) 5 MG tablet    Deep vein thrombosis (DVT) of left upper extremity       Digestive    Constipation    Gastroesophageal reflux disease       Nervous and Auditory    Right hemiparesis       Hematopoietic and Hemostatic    Iron deficiency anemia       Other    Tobacco user    Gait instability    History of CVA (cerebrovascular accident)    Depression      Other Visit Diagnoses     Vitamin D deficiency        Relevant Orders    CBC Auto Differential    Comprehensive Metabolic Panel    Hemoglobin A1c    Lipid Panel    Vitamin D 25 Hydroxy    Ferritin    Iron Profile    Prediabetes          -for hypertension - BP not at goal today. Pt recently was given norvasc 5 mg PO q daily .She is to continue lisinopril-HCTZ 20-25 mg PO daily.  Pt states BP at home stable and running <120/80.  WIll recheck in 1 week. Advised pt to bring BP readings on next visit.  -pt advised to go to ER or call 911 if symptoms get worrisome or severe  - will schedule mammogram screening [facility]  - Will set up appt with DR. Chan Cardiologist since pt had history of cardiac arrest. Needs cardiac evaluation. Consultation appreciated   - have records from [place] regarding pt's course of care. Will refer pt to Sleep study for CA and sleep study with Dr. Ybarra. Consultation appreciated  -GERD - start on protonix 40 mg daily. Gave food choices for GERD to go home with . Gave drug information to go home with. Pt may need EGD in near future  -cardiac arrest - pt  wearing outpt Holter Monitor. She is to follow with Cardiologist in [place] soon. Recent echocardiogram showed no structural heart disease   - tobacco user- counseled pt to stop smoking. Counseled >5 minutes   - DVT of LUE - anticoagulation not indicated at this time. Pt advised to go to ER or call 911 if developing chest pain, shortness of breath or dizziness. Pt is on plavix  - prediabetes - recheck hga1c  - insomnia - appt with Sleep medicine soon  - pt advised to go to Gastroenterology for colonoscopy screening  - pt advised to call for reappt with   - for history of CVA /gait instability/slurring of speech- referred  to [facility] for  PT/OT for gait instability and speech therapy. Consultation appreciated. Refilled lipitor 80 mg PO qhs. plavix 75 mg PO q daily and aspirin 325 mg PO q daily.   Will also get US carotid bilateral to rule out any stenosis.  Home Health for , medication management basic nursing and neurovascular and cardiology monitoring  - for hyperlipidemia - lipitor 80 mg PO qhs recheck lipid panel   - depression - continue on celexa 10 mg PO q daily.  Drug information given. Recommended pt to go see counseling but pt declined at this time  - for constipation - better with linzess 72 mcg PO q daily. Advised pt to go see Gastroenterologist for colonoscopy screening and Hep C antibody positive  - hypertension - BP at goal today. Continue with antihypertensive medications. Continue on lisionpril-HCTZ and norvasc  - pt has appt for pap smear, colonoscopy and mammogram soon   - HEP C antibody positive. 2nd hep vaccination today   -  Pt is former tobacco user - 1 800 QUIT NOW.  She has cut down to 1 cigarette a day.  Continue with nicorette patch and gum   - recheck in 1 month . Next Hep B vaccination in 1 month

## 2018-01-26 NOTE — PATIENT INSTRUCTIONS
"Please call and reschedule appt for pap smear with DAREK Parra.      Please see Sleep Medicine regarding sleep study      Please get labwork    Go to ER or call 911 if symptoms worrisome or severe    Read DASH diet information    Bring BP readings on next visit.  Thanks     Also need to see Gastroenterologist regarding Colonoscopy screening DASH Eating Plan  DASH stands for \"Dietary Approaches to Stop Hypertension.\" The DASH eating plan is a healthy eating plan that has been shown to reduce high blood pressure (hypertension). Additional health benefits may include reducing the risk of type 2 diabetes mellitus, heart disease, and stroke. The DASH eating plan may also help with weight loss.  What do I need to know about the DASH eating plan?  For the DASH eating plan, you will follow these general guidelines:  · Choose foods with less than 150 milligrams of sodium per serving (as listed on the food label).  · Use salt-free seasonings or herbs instead of table salt or sea salt.  · Check with your health care provider or pharmacist before using salt substitutes.  · Eat lower-sodium products. These are often labeled as \"low-sodium\" or \"no salt added.\"  · Eat fresh foods. Avoid eating a lot of canned foods.  · Eat more vegetables, fruits, and low-fat dairy products.  · Choose whole grains. Look for the word \"whole\" as the first word in the ingredient list.  · Choose fish and skinless chicken or turkey more often than red meat. Limit fish, poultry, and meat to 6 oz (170 g) each day.  · Limit sweets, desserts, sugars, and sugary drinks.  · Choose heart-healthy fats.  · Eat more home-cooked food and less restaurant, buffet, and fast food.  · Limit fried foods.  · Do not childress foods. Cook foods using methods such as baking, boiling, grilling, and broiling instead.  · When eating at a restaurant, ask that your food be prepared with less salt, or no salt if possible.  What foods can I eat?  Seek help from a dietitian for " individual calorie needs.  Grains   Whole grain or whole wheat bread. Brown rice. Whole grain or whole wheat pasta. Quinoa, bulgur, and whole grain cereals. Low-sodium cereals. Corn or whole wheat flour tortillas. Whole grain cornbread. Whole grain crackers. Low-sodium crackers.  Vegetables   Fresh or frozen vegetables (raw, steamed, roasted, or grilled). Low-sodium or reduced-sodium tomato and vegetable juices. Low-sodium or reduced-sodium tomato sauce and paste. Low-sodium or reduced-sodium canned vegetables.  Fruits   All fresh, canned (in natural juice), or frozen fruits.  Meat and Other Protein Products   Ground beef (85% or leaner), grass-fed beef, or beef trimmed of fat. Skinless chicken or turkey. Ground chicken or turkey. Pork trimmed of fat. All fish and seafood. Eggs. Dried beans, peas, or lentils. Unsalted nuts and seeds. Unsalted canned beans.  Dairy   Low-fat dairy products, such as skim or 1% milk, 2% or reduced-fat cheeses, low-fat ricotta or cottage cheese, or plain low-fat yogurt. Low-sodium or reduced-sodium cheeses.  Fats and Oils   Tub margarines without trans fats. Light or reduced-fat mayonnaise and salad dressings (reduced sodium). Avocado. Safflower, olive, or canola oils. Natural peanut or almond butter.  Other   Unsalted popcorn and pretzels.  The items listed above may not be a complete list of recommended foods or beverages. Contact your dietitian for more options.   What foods are not recommended?  Grains   White bread. White pasta. White rice. Refined cornbread. Bagels and croissants. Crackers that contain trans fat.  Vegetables   Creamed or fried vegetables. Vegetables in a cheese sauce. Regular canned vegetables. Regular canned tomato sauce and paste. Regular tomato and vegetable juices.  Fruits   Canned fruit in light or heavy syrup. Fruit juice.  Meat and Other Protein Products   Fatty cuts of meat. Ribs, chicken wings, paz, sausage, bologna, salami, chitterlings, fatback, hot  dogs, bratwurst, and packaged luncheon meats. Salted nuts and seeds. Canned beans with salt.  Dairy   Whole or 2% milk, cream, half-and-half, and cream cheese. Whole-fat or sweetened yogurt. Full-fat cheeses or blue cheese. Nondairy creamers and whipped toppings. Processed cheese, cheese spreads, or cheese curds.  Condiments   Onion and garlic salt, seasoned salt, table salt, and sea salt. Canned and packaged gravies. Worcestershire sauce. Tartar sauce. Barbecue sauce. Teriyaki sauce. Soy sauce, including reduced sodium. Steak sauce. Fish sauce. Oyster sauce. Cocktail sauce. Horseradish. Ketchup and mustard. Meat flavorings and tenderizers. Bouillon cubes. Hot sauce. Tabasco sauce. Marinades. Taco seasonings. Relishes.  Fats and Oils   Butter, stick margarine, lard, shortening, ghee, and paz fat. Coconut, palm kernel, or palm oils. Regular salad dressings.  Other   Pickles and olives. Salted popcorn and pretzels.  The items listed above may not be a complete list of foods and beverages to avoid. Contact your dietitian for more information.   Where can I find more information?  [employer]: www.nhlbi.nih.gov/health/health-topics/topics/dash/  This information is not intended to replace advice given to you by your health care provider. Make sure you discuss any questions you have with your health care provider.  Document Released: 12/06/2012 Document Revised: 05/25/2017 Document Reviewed: 10/22/2014  [employer] Interactive Patient Education © 2017 [employer]    Low-Sodium Eating Plan  Sodium raises blood pressure and causes water to be held in the body. Getting less sodium from food will help lower your blood pressure, reduce any swelling, and protect your heart, liver, and kidneys. We get sodium by adding salt (sodium chloride) to food. Most of our sodium comes from canned, boxed, and frozen foods. Restaurant foods, fast foods, and pizza are also very high in sodium. Even if you take  "medicine to lower your blood pressure or to reduce fluid in your body, getting less sodium from your food is important.  What is my plan?  Most people should limit their sodium intake to 2,300 mg a day. Your health care provider recommends that you limit your sodium intake to __________ a day.  What do I need to know about this eating plan?  For the low-sodium eating plan, you will follow these general guidelines:  · Choose foods with a % Daily Value for sodium of less than 5% (as listed on the food label).  · Use salt-free seasonings or herbs instead of table salt or sea salt.  · Check with your health care provider or pharmacist before using salt substitutes.  · Eat fresh foods.  · Eat more vegetables and fruits.  · Limit canned vegetables. If you do use them, rinse them well to decrease the sodium.  · Limit cheese to 1 oz (28 g) per day.  · Eat lower-sodium products, often labeled as \"lower sodium\" or \"no salt added.\"  · Avoid foods that contain monosodium glutamate (MSG). MSG is sometimes added to Chinese food and some canned foods.  · Check food labels (Nutrition Facts labels) on foods to learn how much sodium is in one serving.  · Eat more home-cooked food and less restaurant, buffet, and fast food.  · When eating at a restaurant, ask that your food be prepared with less salt, or no salt if possible.  How do I read food labels for sodium information?  The Nutrition Facts label lists the amount of sodium in one serving of the food. If you eat more than one serving, you must multiply the listed amount of sodium by the number of servings.  Food labels may also identify foods as:  · Sodium free--Less than 5 mg in a serving.  · Very low sodium--35 mg or less in a serving.  · Low sodium--140 mg or less in a serving.  · Light in sodium--50% less sodium in a serving. For example, if a food that usually has 300 mg of sodium is changed to become light in sodium, it will have 150 mg of sodium.  · Reduced sodium--25% less " sodium in a serving. For example, if a food that usually has 400 mg of sodium is changed to reduced sodium, it will have 300 mg of sodium.  What foods can I eat?  Grains   Low-sodium cereals, including oats, puffed wheat and rice, and shredded wheat cereals. Low-sodium crackers. Unsalted rice and pasta. Lower-sodium bread.  Vegetables   Frozen or fresh vegetables. Low-sodium or reduced-sodium canned vegetables. Low-sodium or reduced-sodium tomato sauce and paste. Low-sodium or reduced-sodium tomato and vegetable juices.  Fruits   Fresh, frozen, and canned fruit. Fruit juice.  Meat and Other Protein Products   Low-sodium canned tuna and salmon. Fresh or frozen meat, poultry, seafood, and fish. Lamb. Unsalted nuts. Dried beans, peas, and lentils without added salt. Unsalted canned beans. Homemade soups without salt. Eggs.  Dairy   Milk. Soy milk. Ricotta cheese. Low-sodium or reduced-sodium cheeses. Yogurt.  Condiments   Fresh and dried herbs and spices. Salt-free seasonings. Onion and garlic powders. Low-sodium varieties of mustard and ketchup. Fresh or refrigerated horseradish. Lemon juice.  Fats and Oils   Reduced-sodium salad dressings. Unsalted butter.  Other   Unsalted popcorn and pretzels.  The items listed above may not be a complete list of recommended foods or beverages. Contact your dietitian for more options.   What foods are not recommended?  Grains   Instant hot cereals. Bread stuffing, pancake, and biscuit mixes. Croutons. Seasoned rice or pasta mixes. Noodle soup cups. Boxed or frozen macaroni and cheese. Self-rising flour. Regular salted crackers.  Vegetables   Regular canned vegetables. Regular canned tomato sauce and paste. Regular tomato and vegetable juices. Frozen vegetables in sauces. Salted French fries. Olives. Pickles. Relishes. Sauerkraut. Salsa.  Meat and Other Protein Products   Salted, canned, smoked, spiced, or pickled meats, seafood, or fish. Damon, ham, sausage, hot dogs, corned beef,  chipped beef, and packaged luncheon meats. Salt pork. Jerky. Pickled herring. Anchovies, regular canned tuna, and sardines. Salted nuts.  Dairy   Processed cheese and cheese spreads. Cheese curds. Blue cheese and cottage cheese. Buttermilk.  Condiments   Onion and garlic salt, seasoned salt, table salt, and sea salt. Canned and packaged gravies. Worcestershire sauce. Tartar sauce. Barbecue sauce. Teriyaki sauce. Soy sauce, including reduced sodium. Steak sauce. Fish sauce. Oyster sauce. Cocktail sauce. Horseradish that you find on the shelf. Regular ketchup and mustard. Meat flavorings and tenderizers. Bouillon cubes. Hot sauce. Tabasco sauce. Marinades. Taco seasonings. Relishes.  Fats and Oils   Regular salad dressings. Salted butter. Margarine. Ghee. Damon fat.  Other   Potato and tortilla chips. Corn chips and puffs. Salted popcorn and pretzels. Canned or dried soups. Pizza. Frozen entrees and pot pies.  The items listed above may not be a complete list of foods and beverages to avoid. Contact your dietitian for more information.   This information is not intended to replace advice given to you by your health care provider. Make sure you discuss any questions you have with your health care provider.  Document Released: 06/09/2003 Document Revised: 05/25/2017 Document Reviewed: 10/22/2014  [employer] Interactive Patient Education © 2017 [employer]

## 2018-02-13 LAB
BASOPHILS # BLD AUTO: 0.02 10*3/MM3 (ref 0–0.2)
BASOPHILS NFR BLD AUTO: 0.4 % (ref 0–2)
DEPRECATED RDW RBC AUTO: 47.9 FL (ref 36.4–46.3)
EOSINOPHIL # BLD AUTO: 0.16 10*3/MM3 (ref 0–0.7)
EOSINOPHIL NFR BLD AUTO: 2.9 % (ref 0–7)
ERYTHROCYTE [DISTWIDTH] IN BLOOD BY AUTOMATED COUNT: 16.9 % (ref 11.5–14.5)
HCT VFR BLD AUTO: 28.5 % (ref 35–45)
HCT VFR BLD AUTO: 28.5 % (ref 35–45)
HGB BLD-MCNC: 9.8 G/DL (ref 12–15.5)
HGB RETIC QN: 28 PG (ref 30–38)
IMM GRANULOCYTES # BLD: 0.01 10*3/MM3 (ref 0–0.02)
IMM GRANULOCYTES NFR BLD: 0.2 % (ref 0–0.5)
IMM RETICS NFR: 10.1 % (ref 3–15.9)
LYMPHOCYTES # BLD AUTO: 1.93 10*3/MM3 (ref 0.6–4.2)
LYMPHOCYTES NFR BLD AUTO: 34.6 % (ref 10–50)
MCH RBC QN AUTO: 26.5 PG (ref 26.5–34)
MCHC RBC AUTO-ENTMCNC: 34.4 G/DL (ref 31.4–36)
MCV RBC AUTO: 77 FL (ref 80–98)
MONOCYTES # BLD AUTO: 0.4 10*3/MM3 (ref 0–0.9)
MONOCYTES NFR BLD AUTO: 7.2 % (ref 0–12)
NEUTROPHILS # BLD AUTO: 3.06 10*3/MM3 (ref 2–8.6)
NEUTROPHILS NFR BLD AUTO: 54.7 % (ref 37–80)
PLATELET # BLD AUTO: 164 10*3/MM3 (ref 150–450)
PMV BLD AUTO: 11.1 FL (ref 8–12)
RBC # BLD AUTO: 3.7 10*6/MM3 (ref 3.77–5.16)
RETICS #: 0.06 10*6/MM3 (ref 0.03–0.12)
RETICS/RBC NFR AUTO: 1.49 % (ref 0.63–2.13)
RETICULOCYTE PRODUCTION INDEX: 0.52 % (ref 0.63–2.13)
WBC NRBC COR # BLD: 5.58 10*3/MM3 (ref 3.2–9.8)

## 2018-02-13 PROCEDURE — 82728 ASSAY OF FERRITIN: CPT | Performed by: FAMILY MEDICINE

## 2018-02-13 PROCEDURE — 80061 LIPID PANEL: CPT | Performed by: FAMILY MEDICINE

## 2018-02-13 PROCEDURE — 36415 COLL VENOUS BLD VENIPUNCTURE: CPT | Performed by: FAMILY MEDICINE

## 2018-02-13 PROCEDURE — 83540 ASSAY OF IRON: CPT | Performed by: FAMILY MEDICINE

## 2018-02-13 PROCEDURE — 85025 COMPLETE CBC W/AUTO DIFF WBC: CPT | Performed by: PHYSICIAN ASSISTANT

## 2018-02-13 PROCEDURE — 82306 VITAMIN D 25 HYDROXY: CPT | Performed by: FAMILY MEDICINE

## 2018-02-13 PROCEDURE — 85046 RETICYTE/HGB CONCENTRATE: CPT | Performed by: PHYSICIAN ASSISTANT

## 2018-02-13 PROCEDURE — 80053 COMPREHEN METABOLIC PANEL: CPT | Performed by: FAMILY MEDICINE

## 2018-02-13 PROCEDURE — 83550 IRON BINDING TEST: CPT | Performed by: FAMILY MEDICINE

## 2018-02-13 PROCEDURE — 83036 HEMOGLOBIN GLYCOSYLATED A1C: CPT | Performed by: FAMILY MEDICINE

## 2018-02-14 LAB
25(OH)D3 SERPL-MCNC: <12.8 NG/ML (ref 30–100)
ALBUMIN SERPL-MCNC: 4 G/DL (ref 3.4–4.8)
ALBUMIN/GLOB SERPL: 1 G/DL (ref 1.1–1.8)
ALP SERPL-CCNC: 131 U/L (ref 38–126)
ALT SERPL W P-5'-P-CCNC: 22 U/L (ref 9–52)
ANION GAP SERPL CALCULATED.3IONS-SCNC: 14 MMOL/L (ref 5–15)
ARTICHOKE IGE QN: 54 MG/DL (ref 1–129)
AST SERPL-CCNC: 16 U/L (ref 14–36)
BILIRUB SERPL-MCNC: 0.1 MG/DL (ref 0.2–1.3)
BUN BLD-MCNC: 15 MG/DL (ref 7–21)
BUN/CREAT SERPL: 13.4 (ref 7–25)
CALCIUM SPEC-SCNC: 9.9 MG/DL (ref 8.4–10.2)
CHLORIDE SERPL-SCNC: 109 MMOL/L (ref 95–110)
CHOLEST SERPL-MCNC: 149 MG/DL (ref 0–199)
CO2 SERPL-SCNC: 20 MMOL/L (ref 22–31)
CREAT BLD-MCNC: 1.12 MG/DL (ref 0.5–1)
FERRITIN SERPL-MCNC: 225 NG/ML (ref 11.1–264)
GFR SERPL CREATININE-BSD FRML MDRD: 60 ML/MIN/1.73 (ref 45–104)
GLOBULIN UR ELPH-MCNC: 4.1 GM/DL (ref 2.3–3.5)
GLUCOSE BLD-MCNC: 121 MG/DL (ref 60–100)
HBA1C MFR BLD: 5.6 % (ref 4–5.6)
HDLC SERPL-MCNC: 63 MG/DL (ref 60–200)
IRON 24H UR-MRATE: 78 MCG/DL (ref 37–170)
IRON SATN MFR SERPL: 28 % (ref 15–50)
LDLC/HDLC SERPL: 0.99 {RATIO} (ref 0–3.22)
POTASSIUM BLD-SCNC: 3.8 MMOL/L (ref 3.5–5.1)
PROT SERPL-MCNC: 8.1 G/DL (ref 6.3–8.6)
SODIUM BLD-SCNC: 143 MMOL/L (ref 137–145)
TIBC SERPL-MCNC: 274 MCG/DL (ref 265–497)
TRIGL SERPL-MCNC: 118 MG/DL (ref 20–199)

## 2018-04-04 ENCOUNTER — OFFICE VISIT (OUTPATIENT)
Dept: FAMILY MEDICINE CLINIC | Facility: CLINIC | Age: 63
End: 2018-04-04

## 2018-04-04 VITALS
WEIGHT: 238.8 LBS | SYSTOLIC BLOOD PRESSURE: 120 MMHG | BODY MASS INDEX: 36.19 KG/M2 | TEMPERATURE: 98.6 F | HEART RATE: 92 BPM | HEIGHT: 68 IN | RESPIRATION RATE: 16 BRPM | DIASTOLIC BLOOD PRESSURE: 70 MMHG

## 2018-04-04 DIAGNOSIS — I63.9 CEREBROVASCULAR ACCIDENT (CVA), UNSPECIFIED MECHANISM (HCC): Primary | ICD-10-CM

## 2018-04-04 DIAGNOSIS — M54.42 CHRONIC LOW BACK PAIN WITH BILATERAL SCIATICA, UNSPECIFIED BACK PAIN LATERALITY: ICD-10-CM

## 2018-04-04 DIAGNOSIS — G89.29 CHRONIC BACK PAIN, UNSPECIFIED BACK LOCATION, UNSPECIFIED BACK PAIN LATERALITY: ICD-10-CM

## 2018-04-04 DIAGNOSIS — R76.8 HEPATITIS C ANTIBODY POSITIVE IN BLOOD: ICD-10-CM

## 2018-04-04 DIAGNOSIS — F32.A DEPRESSION, UNSPECIFIED DEPRESSION TYPE: ICD-10-CM

## 2018-04-04 DIAGNOSIS — R47.81 SLURRING OF SPEECH: ICD-10-CM

## 2018-04-04 DIAGNOSIS — I82.622 DEEP VEIN THROMBOSIS (DVT) OF LEFT UPPER EXTREMITY, UNSPECIFIED CHRONICITY, UNSPECIFIED VEIN (HCC): ICD-10-CM

## 2018-04-04 DIAGNOSIS — K21.9 GASTROESOPHAGEAL REFLUX DISEASE, ESOPHAGITIS PRESENCE NOT SPECIFIED: ICD-10-CM

## 2018-04-04 DIAGNOSIS — K59.00 CONSTIPATION, UNSPECIFIED CONSTIPATION TYPE: ICD-10-CM

## 2018-04-04 DIAGNOSIS — Z86.74 HISTORY OF CARDIAC ARREST: ICD-10-CM

## 2018-04-04 DIAGNOSIS — M54.41 CHRONIC LOW BACK PAIN WITH BILATERAL SCIATICA, UNSPECIFIED BACK PAIN LATERALITY: ICD-10-CM

## 2018-04-04 DIAGNOSIS — M54.9 CHRONIC BACK PAIN, UNSPECIFIED BACK LOCATION, UNSPECIFIED BACK PAIN LATERALITY: ICD-10-CM

## 2018-04-04 DIAGNOSIS — E66.9 OBESITY, UNSPECIFIED CLASSIFICATION, UNSPECIFIED OBESITY TYPE, UNSPECIFIED WHETHER SERIOUS COMORBIDITY PRESENT: ICD-10-CM

## 2018-04-04 DIAGNOSIS — I10 ESSENTIAL HYPERTENSION: ICD-10-CM

## 2018-04-04 DIAGNOSIS — R26.81 GAIT INSTABILITY: ICD-10-CM

## 2018-04-04 DIAGNOSIS — E78.5 HYPERLIPIDEMIA, UNSPECIFIED HYPERLIPIDEMIA TYPE: ICD-10-CM

## 2018-04-04 DIAGNOSIS — G89.29 CHRONIC LOW BACK PAIN WITH BILATERAL SCIATICA, UNSPECIFIED BACK PAIN LATERALITY: ICD-10-CM

## 2018-04-04 PROCEDURE — 99214 OFFICE O/P EST MOD 30 MIN: CPT | Performed by: FAMILY MEDICINE

## 2018-04-04 NOTE — PATIENT INSTRUCTIONS
Call Gastroenterology, OB/GYN for colonoscopy and pap smear    Call Sleep medicine -for sleep study    Confirm mammogram and get that on before next visit

## 2018-04-04 NOTE — PROGRESS NOTES
Subjective   Molinaeduardo Licea is a 62 y.o. female.     Problem List  1. Essential Hypertension   2. Morbid Obesity  BMI >40  3. H/O Brain Tumor/Meningioma   4. GERD  5. Constipation  6. Chronic back pain, DDD of spine  7. Hep C antibody positive/chronic hepatitis C  8.  Vitamin D deficiency   9.  Microcytic Anemia. Hemoglobin C. Iron deficiency anemia  10. Low TSH/subclinical hyperthyroidism   11. H/O CVA. Restricted diffusion in parietal supraventricular white matter bilaterally more on left than the  Right.    12. Hyperlipidemia ASCVD >5%  13. Hemiparesis on right side   14. Depression  15. Proximal LUE DVT  16. Prediabetes       PSHX  1. H/O Brain Tumors sp Surgical Removal 2017 at [facility]   2. Right ankle surgery in   3. Emergency C section 40 years ago       SocialHx  -tobacco smoker.  Has been smoking for 10 years  Smokes once a day   -occasional alcohol drinker   -No illicit drugs. 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 Is here for recheck Pt has history of Left CVA and brain surgery. Since her CVA she has been having issues with right hemiparesis and speech issues.  She currently takes aspirin 81 mg PO q daily along with lipitor 20 mg PO qhs for hyperlipidemia. For hypertension pt is on lisinopril-HCTZ 20-25 mg PO BID along with norvasc 5 mg PO q daily.  For iron deficiency pt is taking iron pill 325 mg PO q daily.  On last visit pt was started on linzess 72 mcg PO q daily for constipation that has improved since taking it.  Pt missed appt to get pap smear done.  Also has yet to get colonoscopy screening. Pt is also positive for Hep C.   Pt recently saw Neurologist who started pt on aspirin 325 mg PO q daily from 81 mg daily  and also plavix 75 mg PO q daily. Pt also had lipitor increased from 20 mg to 80 mg PO qhs.   Pt also was ordered PT/OT but needs a new order since it was ordered in [place] fo She has gait instability and speech issues.   She continues to take iron pill for iron deficiency anemia. Pt also has been having issues with depression since her CVA and surgery. She has lost interest in the things she likes to do for several weeks.   She denies any suicidal or homicidal ideations.  She is not on medication currently.  Pt's hypertension is better controlled with addition of norvasc 5 mg PO q daily. Pt continues to take lisinopril-HCTZ 20/25 mg PO BID. She is compliant .    Pt was recently admitted to [facility] after presenting to ED with  AMS and subsequent PEA arrest. Pt had CPR performed.   Her hemodynamic improved with IV antbiotics, fluids and pressure.  Septic workup was negative.  An EKG and echocardiogram was done that showed structural or electrical heart disease . PT/OT evaluted pt and pt was discharged on 11/21/17. Neurologist was consulted and had low suspicion for actue stroke causing her symptoms.  Pt also was found to have an upper extremity DVT that was line associated.  A duplex US of LUE was done.  It was not proximal enough to warrant systemic anticogulation. It was recommended pt get outapatient sleep study and see PT/OT regarding gait issues. She is wearing an outpt Holter monitor and she is to follow up with Neurologist and Cardiologist soon. Pt reports no chest pain or shortness of breath today.  She denies any headaches.  She has no home health set up yet.  Pt is a high fall risk Pt has seen Gastroenterologist  And colonoscopy is on hold now.  She does have an appt soon with [facility] . She also has appt with Sleep Medicine regarding sleep study with Dr. Ybarra.  For pap smear pt had appt to see DAREK Parra but she canceled appt Pt has finished PT/OT at [facility]. She currently  goes to Speech Therapy stillPt also has chronic hepatitis C/HEP C antibody positive.  She is due for 2nd Hep B vaccination today Blood pressure lately has  No been at goal.  She continues to take lisinopril-HCTZ 20-25 mg daily along with novasc 5 mg PO q daily.  Her BP is >180/100 today.  She has been feeling fatigued lately  She has not been feeling well.  She is not feverish Pt has stopped smoking. She is using nicorette gum.Pt recently had her aspirin stopped by Home Health.  Will restart again on aspirin 325 mg daily Pt has been having reflux issues and heartburn. She has yet to get EGD. That is currently on hold. It occurs everyday . She is on protonix  40 mg PO q daily.      4/4/18 pt is here for followup and recheck She has missed several appt with DAREK Parra for pap smear and Gastroenteroogy for colonoscopy screening. She has also missed appt with sleep mediciine. BP is good with norvasc 5 mg daily and lisinopril-HCTZ 20-25 mg daily.  She continues to take aspirin 325 mg daily and plavix for stroke. She continue on lipitor for hyperlipidemia. She also is taking linzess which helps with constipation.  Overall she has been feeling better. She continues to quit smoking and uses nicotine gum. She also takes protonix for GERD. She has yet to get mammogram. Pt lost 8 lbs since last visit     Cerebrovascular Accident   This is a new problem. The current episode started more than 1 month ago. The problem occurs daily. The problem has been waxing and waning. Associated symptoms include fatigue. Pertinent negatives include no abdominal pain, anorexia, arthralgias, change in bowel habit, chest pain, chills, congestion, coughing, diaphoresis, fever, headaches, joint swelling, myalgias, nausea, neck pain, numbness, rash, sore throat, swollen glands, urinary symptoms, vertigo, visual change, vomiting or weakness. Nothing aggravates the symptoms. She has tried nothing for the symptoms. The treatment provided no  relief.   Extremity Weakness    The pain is present in the right wrist, right hand, right fingers, right arm, right shoulder, right elbow, right upper leg, right knee, right lower leg, right ankle, right foot and right toes. This is a new problem. The current episode started more than 1 month ago. There has been no history of extremity trauma. The problem occurs daily. The problem has been waxing and waning. The pain is at a severity of 3/10. The pain is mild. Pertinent negatives include no fever, inability to bear weight, itching, joint locking, joint swelling, limited range of motion, numbness, stiffness or tingling. She has tried nothing for the symptoms. The treatment provided no relief. Family history does not include gout or rheumatoid arthritis. There is no history of diabetes, gout, osteoarthritis or rheumatoid arthritis.   Depression   Visit Type: initial  Onset of symptoms: at an unknown time  Progression since onset: gradually worsening  Patient presents with the following symptoms: anhedonia, decreased concentration, depressed mood and fatigue.  Patient is not experiencing: chest pain, choking sensation, compulsions, confusion, dizziness, dry mouth, excessive worry, feelings of hopelessness, feelings of worthlessness, hypersomnia, hyperventilation, impotence, insomnia, irritability, malaise, memory impairment, muscle tension, nausea, nervousness/anxiety, obsessions, palpitations, panic, psychomotor agitation, psychomotor retardation, restlessness, shortness of breath, suicidal ideas, suicidal planning, thoughts of death, weight gain and weight loss.  Frequency of symptoms: constantly   Severity: moderate   Risk factors: stroke.  Patient has a history of: depression  No history of: anemia, anxiety/panic attacks, arrhythmia, asthma, bipolar disorder, CAD, CHF, chronic lung disease, fibromyalgia, hyperthyroidism, suicide attempt, mental illness and substance abuse  Treatment tried: nothing  Compliance with  "treatment: variable         The following portions of the patient's history were reviewed and updated as appropriate: allergies, current medications, past family history, past medical history, past social history, past surgical history and problem list.    Review of Systems   Constitutional: Positive for fatigue. Negative for chills, diaphoresis, fever, irritability, weight gain and weight loss.   HENT: Positive for voice change. Negative for congestion and sore throat.    Eyes: Negative.    Respiratory: Negative.  Negative for cough, choking and shortness of breath.    Cardiovascular: Negative.  Negative for chest pain and palpitations.   Gastrointestinal: Positive for constipation. Negative for abdominal pain, anorexia, change in bowel habit, diarrhea, nausea, rectal pain and vomiting.   Endocrine: Negative.    Genitourinary: Negative.  Negative for dysuria, impotence and pelvic pain.   Musculoskeletal: Positive for back pain, extremity weakness and gait problem. Negative for arthralgias, gout, joint swelling, myalgias, neck pain and stiffness.   Skin: Negative.  Negative for itching and rash.   Allergic/Immunologic: Negative.    Neurological: Negative for vertigo, tingling, weakness, numbness and headaches.   Hematological: Negative.    Psychiatric/Behavioral: Positive for decreased concentration. Negative for confusion, substance abuse and suicidal ideas. The patient is not nervous/anxious and does not have insomnia.        Objective    /70   Pulse 92   Temp 98.6 °F (37 °C)   Resp 16   Ht 171.5 cm (67.5\")   Wt 108 kg (238 lb 12.8 oz)   BMI 36.85 kg/m²           Chemistry        Component Value Date/Time     02/13/2018 0928    K 3.8 02/13/2018 0928     02/13/2018 0928    CO2 20.0 (L) 02/13/2018 0928    BUN 15 02/13/2018 0928    CREATININE 1.12 (H) 02/13/2018 0928        Component Value Date/Time    CALCIUM 9.9 02/13/2018 0928    ALKPHOS 131 (H) 02/13/2018 0928    AST 16 02/13/2018 0928    " ALT 22 02/13/2018 0928    BILITOT 0.1 (L) 02/13/2018 0928        Lab Results   Component Value Date    WBC 5.58 02/13/2018    HGB 9.8 (L) 02/13/2018    HCT 28.5 (L) 02/13/2018    HCT 28.5 (L) 02/13/2018    MCV 77.0 (L) 02/13/2018     02/13/2018     Lab Results   Component Value Date    CHOL 149 02/13/2018    TRIG 118 02/13/2018    HDL 63 02/13/2018    LDL 54 02/13/2018     Lab Results   Component Value Date    HGBA1C 5.6 02/13/2018     Lab Results   Component Value Date    TSH 0.540 09/20/2017     Office Visit on 01/26/2018   Component Date Value Ref Range Status   • WBC 02/13/2018 5.58  3.20 - 9.80 10*3/mm3 Final   • RBC 02/13/2018 3.70* 3.77 - 5.16 10*6/mm3 Final   • Hemoglobin 02/13/2018 9.8* 12.0 - 15.5 g/dL Final   • Hematocrit 02/13/2018 28.5* 35.0 - 45.0 % Final   • MCV 02/13/2018 77.0* 80.0 - 98.0 fL Final   • MCH 02/13/2018 26.5  26.5 - 34.0 pg Final   • MCHC 02/13/2018 34.4  31.4 - 36.0 g/dL Final   • RDW 02/13/2018 16.9* 11.5 - 14.5 % Final   • RDW-SD 02/13/2018 47.9* 36.4 - 46.3 fl Final   • MPV 02/13/2018 11.1  8.0 - 12.0 fL Final   • Platelets 02/13/2018 164  150 - 450 10*3/mm3 Final   • Neutrophil % 02/13/2018 54.7  37.0 - 80.0 % Final   • Lymphocyte % 02/13/2018 34.6  10.0 - 50.0 % Final   • Monocyte % 02/13/2018 7.2  0.0 - 12.0 % Final   • Eosinophil % 02/13/2018 2.9  0.0 - 7.0 % Final   • Basophil % 02/13/2018 0.4  0.0 - 2.0 % Final   • Immature Grans % 02/13/2018 0.2  0.0 - 0.5 % Final   • Neutrophils, Absolute 02/13/2018 3.06  2.00 - 8.60 10*3/mm3 Final   • Lymphocytes, Absolute 02/13/2018 1.93  0.60 - 4.20 10*3/mm3 Final   • Monocytes, Absolute 02/13/2018 0.40  0.00 - 0.90 10*3/mm3 Final   • Eosinophils, Absolute 02/13/2018 0.16  0.00 - 0.70 10*3/mm3 Final   • Basophils, Absolute 02/13/2018 0.02  0.00 - 0.20 10*3/mm3 Final   • Immature Grans, Absolute 02/13/2018 0.01  0.00 - 0.02 10*3/mm3 Final   • Glucose 02/14/2018 121* 60 - 100 mg/dL Final   • BUN 02/14/2018 15  7 - 21 mg/dL Final   •  Creatinine 02/14/2018 1.12* 0.50 - 1.00 mg/dL Final   • Sodium 02/14/2018 143  137 - 145 mmol/L Final   • Potassium 02/14/2018 3.8  3.5 - 5.1 mmol/L Final   • Chloride 02/14/2018 109  95 - 110 mmol/L Final   • CO2 02/14/2018 20.0* 22.0 - 31.0 mmol/L Final   • Calcium 02/14/2018 9.9  8.4 - 10.2 mg/dL Final   • Total Protein 02/14/2018 8.1  6.3 - 8.6 g/dL Final   • Albumin 02/14/2018 4.00  3.40 - 4.80 g/dL Final   • ALT (SGPT) 02/14/2018 22  9 - 52 U/L Final   • AST (SGOT) 02/14/2018 16  14 - 36 U/L Final   • Alkaline Phosphatase 02/14/2018 131* 38 - 126 U/L Final   • Total Bilirubin 02/14/2018 0.1* 0.2 - 1.3 mg/dL Final   • eGFR   Amer 02/14/2018 60  45 - 104 mL/min/1.73 Final   • Globulin 02/14/2018 4.1* 2.3 - 3.5 gm/dL Final   • A/G Ratio 02/14/2018 1.0* 1.1 - 1.8 g/dL Final   • BUN/Creatinine Ratio 02/14/2018 13.4  7.0 - 25.0 Final   • Anion Gap 02/14/2018 14.0  5.0 - 15.0 mmol/L Final   • Hemoglobin A1C 02/14/2018 5.6  4 - 5.6 % Final   • Total Cholesterol 02/14/2018 149  0 - 199 mg/dL Final   • Triglycerides 02/14/2018 118  20 - 199 mg/dL Final   • HDL Cholesterol 02/14/2018 63  60 - 200 mg/dL Final   • LDL Cholesterol  02/14/2018 54  1 - 129 mg/dL Final   • LDL/HDL Ratio 02/14/2018 0.99  0.00 - 3.22 Final   • 25 Hydroxy, Vitamin D 02/14/2018 <12.8* 30.0 - 100.0 ng/ml Final   • Ferritin 02/14/2018 225.00  11.10 - 264.00 ng/mL Final   • Iron 02/14/2018 78  37 - 170 mcg/dL Final   • TIBC 02/14/2018 274  265 - 497 mcg/dL Final   • Iron Saturation 02/14/2018 28  15 - 50 % Final       Physical Exam   Constitutional: She is oriented to person, place, and time. She appears well-developed and well-nourished. No distress.   Pt has speech issues    HENT:   Head: Normocephalic and atraumatic.   Right Ear: External ear normal.   Left Ear: External ear normal.   Eyes: Conjunctivae and EOM are normal. Pupils are equal, round, and reactive to light. Right eye exhibits no discharge. Left eye exhibits no discharge. No  scleral icterus.   Neck: Normal range of motion. Neck supple. No JVD present. No tracheal deviation present. No thyromegaly present.   Cardiovascular: Normal rate, regular rhythm and normal heart sounds.    Pulmonary/Chest: Effort normal and breath sounds normal. No stridor. No respiratory distress. She has no wheezes.   Abdominal: Soft. She exhibits no distension and no mass. There is no tenderness. There is no rebound and no guarding. No hernia.   Musculoskeletal: She exhibits tenderness. She exhibits no edema or deformity.        Lumbar back: She exhibits decreased range of motion, tenderness, bony tenderness, pain and spasm.   Motor strength 4/5 in upper and lower right extremity     Gait instability    Pt is high fall risk  Get up and go test >10 seconds     Lymphadenopathy:     She has no cervical adenopathy.   Neurological: She is alert and oriented to person, place, and time. She has normal reflexes. No cranial nerve deficit. Coordination normal.   Skin: Skin is warm and dry. No rash noted. She is not diaphoretic. No erythema. No pallor.   Psychiatric: She has a normal mood and affect. Her behavior is normal.   Nursing note and vitals reviewed.      Assessment/Plan   Problems Addressed this Visit        Cardiovascular and Mediastinum    Cerebrovascular accident (CVA) - Primary    Hyperlipidemia    Essential hypertension    Deep vein thrombosis (DVT) of left upper extremity       Digestive    Constipation    Gastroesophageal reflux disease    Obesity       Nervous and Auditory    Chronic low back pain with bilateral sciatica    Chronic back pain       Other    Slurring of speech    Hepatitis C antibody positive in blood    Gait instability    Depression    History of cardiac arrest      Other Visit Diagnoses    None.     -for hypertension - BP  at goal today. Pt recently was given norvasc 5 mg PO q daily .She is to continue lisinopril-HCTZ 20-25 mg PO daily.  -pt advised to go to ER or call 911 if symptoms  get worrisome or severe  - mammogram appt soon  - for history of cardiac arrest - pt seeing Cardiologist. Has appt soon. Pt denies any chest pain or shortness of breat  - have records from [place] regarding pt's course of care. Will refer pt to Sleep study for CA and sleep study with Dr. Ybarra. Consultation appreciated  -GERD - start on protonix 40 mg daily. Gave food choices for GERD to go home with . Gave drug information to go home with. Pt may need EGD in near future  - tobacco user- counseled pt to stop smoking. Counseled >5 minutes   - DVT of LUE - anticoagulation not indicated at this time. Pt advised to go to ER or call 911 if developing chest pain, shortness of breath or dizziness. Pt is on plavix  - prediabetes - recheck hga1c  - insomnia - pt will reschedule appt with sleep study  - pt advised to go and call  Gastroenterology for colonoscopy screening and Hepatitis C  - pt advised to call for reappt with DAREK Parra for pap smear  - for history of CVA /gait instability/slurring of speech- pt is seeing Neurologist in Neurologist next month. Clinically stable   Refilled lipitor 80 mg PO qhs. plavix 75 mg PO q daily and aspirin 325 mg PO q daily.   Will also get US carotid bilateral to rule out any stenosis.  Home Health for , medication management basic nursing and neurovascular and cardiology monitoring  - for hyperlipidemia - lipitor 80 mg PO qhs recheck lipid panel   - depression - stable. Pt is off celexa now  - for constipation - better with linzess 72 mcg PO on c a week . A  - hypertension - BP at goal today. Continue with antihypertensive medications. Continue on lisionpril-HCTZ and norvasc  -obesity - pt lost 8 lbs since last visit   - for chronic back patient adivsed to call appt with Pain Management soon   -recheck in 6 weeks   - gave pt prescription for walker with chair              Review of Systems   Constitutional: Positive for fatigue. Negative for chills,  diaphoresis, fever, irritability, unexpected weight gain and unexpected weight loss.   HENT: Positive for voice change. Negative for congestion and sore throat.    Eyes: Negative.    Respiratory: Negative.  Negative for cough, choking and shortness of breath.    Cardiovascular: Negative.  Negative for chest pain and palpitations.   Gastrointestinal: Positive for constipation. Negative for abdominal pain, anorexia, change in bowel habit, diarrhea, nausea, rectal pain and vomiting.   Endocrine: Negative.    Genitourinary: Negative.  Negative for dysuria, impotence and pelvic pain.   Musculoskeletal: Positive for back pain, extremity weakness and gait problem. Negative for arthralgias, gout, joint swelling, myalgias, neck pain and stiffness.   Skin: Negative.  Negative for itching and rash.   Allergic/Immunologic: Negative.    Neurological: Negative for vertigo, tingling, weakness, numbness, headaches and confusion.   Hematological: Negative.    Psychiatric/Behavioral: Positive for decreased concentration and depressed mood. Negative for substance abuse and suicidal ideas. The patient is not nervous/anxious and does not have insomnia.        Physical Exam   Constitutional: She is oriented to person, place, and time. She appears well-developed and well-nourished. No distress.   Pt has speech issues    HENT:   Head: Normocephalic and atraumatic.   Right Ear: External ear normal.   Left Ear: External ear normal.   Eyes: Conjunctivae and EOM are normal. Pupils are equal, round, and reactive to light. Right eye exhibits no discharge. Left eye exhibits no discharge. No scleral icterus.   Neck: Normal range of motion. Neck supple. No JVD present. No tracheal deviation present. No thyromegaly present.   Cardiovascular: Normal rate, regular rhythm and normal heart sounds.    Pulmonary/Chest: Effort normal and breath sounds normal. No stridor. No respiratory distress. She has no wheezes.   Abdominal: Soft. She exhibits no  distension and no mass. There is no tenderness. There is no rebound and no guarding. No hernia.   Musculoskeletal: She exhibits tenderness. She exhibits no edema or deformity.        Lumbar back: She exhibits decreased range of motion, tenderness, bony tenderness, pain and spasm.   Motor strength 4/5 in upper and lower right extremity     Gait instability    Pt is high fall risk  Get up and go test >10 seconds     Lymphadenopathy:     She has no cervical adenopathy.   Neurological: She is alert and oriented to person, place, and time. She has normal reflexes. No cranial nerve deficit. Coordination normal.   Skin: Skin is warm and dry. No rash noted. She is not diaphoretic. No erythema. No pallor.   Psychiatric: She has a normal mood and affect. Her behavior is normal.   Nursing note and vitals reviewed.

## 2018-06-01 ENCOUNTER — OFFICE VISIT (OUTPATIENT)
Dept: FAMILY MEDICINE CLINIC | Facility: CLINIC | Age: 63
End: 2018-06-01

## 2018-06-01 VITALS
OXYGEN SATURATION: 97 % | HEART RATE: 83 BPM | HEIGHT: 68 IN | DIASTOLIC BLOOD PRESSURE: 78 MMHG | WEIGHT: 227.8 LBS | SYSTOLIC BLOOD PRESSURE: 126 MMHG | TEMPERATURE: 98.6 F | BODY MASS INDEX: 34.53 KG/M2

## 2018-06-01 DIAGNOSIS — Z12.39 ENCOUNTER FOR SCREENING FOR MALIGNANT NEOPLASM OF BREAST: ICD-10-CM

## 2018-06-01 DIAGNOSIS — Z72.0 TOBACCO USER: ICD-10-CM

## 2018-06-01 DIAGNOSIS — R76.8 HEPATITIS C ANTIBODY POSITIVE IN BLOOD: ICD-10-CM

## 2018-06-01 DIAGNOSIS — D50.9 IRON DEFICIENCY ANEMIA, UNSPECIFIED IRON DEFICIENCY ANEMIA TYPE: ICD-10-CM

## 2018-06-01 DIAGNOSIS — Z86.73 HISTORY OF CVA (CEREBROVASCULAR ACCIDENT): Primary | ICD-10-CM

## 2018-06-01 DIAGNOSIS — K59.00 CONSTIPATION, UNSPECIFIED CONSTIPATION TYPE: ICD-10-CM

## 2018-06-01 DIAGNOSIS — E78.5 HYPERLIPIDEMIA, UNSPECIFIED HYPERLIPIDEMIA TYPE: ICD-10-CM

## 2018-06-01 DIAGNOSIS — I10 ESSENTIAL HYPERTENSION: ICD-10-CM

## 2018-06-01 DIAGNOSIS — K21.9 GASTROESOPHAGEAL REFLUX DISEASE, ESOPHAGITIS PRESENCE NOT SPECIFIED: ICD-10-CM

## 2018-06-01 DIAGNOSIS — R26.81 GAIT INSTABILITY: ICD-10-CM

## 2018-06-01 DIAGNOSIS — I63.9 CEREBROVASCULAR ACCIDENT (CVA), UNSPECIFIED MECHANISM (HCC): ICD-10-CM

## 2018-06-01 PROCEDURE — 99214 OFFICE O/P EST MOD 30 MIN: CPT | Performed by: FAMILY MEDICINE

## 2018-06-01 RX ORDER — VARENICLINE TARTRATE 0.5 MG/1
TABLET, FILM COATED ORAL
Qty: 90 TABLET | Refills: 3 | Status: SHIPPED | OUTPATIENT
Start: 2018-06-01 | End: 2018-11-20

## 2018-06-01 NOTE — PROGRESS NOTES
Subjective   Molinaeduardo Licea is a 62 y.o. female.     Problem List  1. Essential Hypertension   2. Morbid Obesity  BMI >40  3. H/O Brain Tumor/Meningioma   4. GERD  5. Constipation  6. Chronic back pain, DDD of spine  7. Hep C antibody positive/chronic hepatitis C  8.  Vitamin D deficiency   9.  Microcytic Anemia. Hemoglobin C. Iron deficiency anemia  10. Low TSH/subclinical hyperthyroidism   11. H/O CVA. Restricted diffusion in parietal supraventricular white matter bilaterally more on left than the  Right.    12. Hyperlipidemia ASCVD >5%  13. Hemiparesis on right side   14. Depression  15. Proximal LUE DVT  16. Prediabetes       PSHX  1. H/O Brain Tumors sp Surgical Removal 2017 at [facility]   2. Right ankle surgery in   3. Emergency C section 40 years ago       SocialHx  -tobacco smoker.  Has been smoking for 10 years  Smokes once a day   -occasional alcohol drinker   -No illicit drugs. 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 Is here for recheck Pt has history of Left CVA and brain surgery. Since her CVA she has been having issues with right hemiparesis and speech issues.  She currently takes aspirin 81 mg PO q daily along with lipitor 20 mg PO qhs for hyperlipidemia. For hypertension pt is on lisinopril-HCTZ 20-25 mg PO BID along with norvasc 5 mg PO q daily.  For iron deficiency pt is taking iron pill 325 mg PO q daily.  On last visit pt was started on linzess 72 mcg PO q daily for constipation that has improved since taking it.  Pt missed appt to get pap smear done.  Also has yet to get colonoscopy screening. Pt is also positive for Hep C.   Pt recently saw Neurologist who started pt on aspirin 325 mg PO q daily from 81 mg daily  and also plavix 75 mg PO q daily. Pt also had lipitor increased from 20 mg to 80 mg PO qhs.   Pt also was ordered PT/OT but needs a new order since it was ordered in [place] fo She has gait instability and speech issues.   She continues to take iron pill for iron deficiency anemia. Pt also has been having issues with depression since her CVA and surgery. She has lost interest in the things she likes to do for several weeks.   She denies any suicidal or homicidal ideations.  She is not on medication currently.  Pt's hypertension is better controlled with addition of norvasc 5 mg PO q daily. Pt continues to take lisinopril-HCTZ 20/25 mg PO BID. She is compliant .    Pt was recently admitted to [facility] after presenting to ED with  AMS and subsequent PEA arrest. Pt had CPR performed.   Her hemodynamic improved with IV antbiotics, fluids and pressure.  Septic workup was negative.  An EKG and echocardiogram was done that showed structural or electrical heart disease . PT/OT evaluted pt and pt was discharged on 11/21/17. Neurologist was consulted and had low suspicion for actue stroke causing her symptoms.  Pt also was found to have an upper extremity DVT that was line associated.  A duplex US of LUE was done.  It was not proximal enough to warrant systemic anticogulation. It was recommended pt get outapatient sleep study and see PT/OT regarding gait issues. She is wearing an outpt Holter monitor and she is to follow up with Neurologist and Cardiologist soon. Pt reports no chest pain or shortness of breath today.  She denies any headaches.  She has no home health set up yet.  Pt is a high fall risk Pt has seen Gastroenterologist  And colonoscopy is on hold now.  She does have an appt soon with [facility] . She also has appt with Sleep Medicine regarding sleep study with Dr. Ybarra.  For pap smear pt had appt to see DAREK Parra but she canceled appt Pt has finished PT/OT at [facility]. She currently  goes to Speech Therapy stillPt also has chronic hepatitis C/HEP C antibody positive.  She is due for 2nd Hep B vaccination today Blood pressure lately has  No been at goal.  She continues to take lisinopril-HCTZ 20-25 mg daily along with novasc 5 mg PO q daily.  Her BP is >180/100 today.  She has been feeling fatigued lately  She has not been feeling well.  She is not feverish Pt has stopped smoking. She is using nicorette gum.Pt recently had her aspirin stopped by Home Health.  Will restart again on aspirin 325 mg daily Pt has been having reflux issues and heartburn. She has yet to get EGD. That is currently on hold. It occurs everyday . She is on protonix  40 mg PO q daily.      4/4/18 pt is here for followup and recheck She has missed several appt with DAREK Parra for pap smear and Gastroenteroogy for colonoscopy screening. She has also missed appt with sleep mediciine. BP is good with norvasc 5 mg daily and lisinopril-HCTZ 20-25 mg daily.  She continues to take aspirin 325 mg daily and plavix for stroke. She continue on lipitor for hyperlipidemia. She also is taking linzess which helps with constipation.  Overall she has been feeling better. She continues to quit smoking and uses nicotine gum. She also takes protonix for GERD. She has yet to get mammogram. Pt lost 8 lbs since last visit     6/1/18 pt is here for recheck . Pt missed appt for colonoscopy screening with Gastroenterologist she will call for an appt. Also she has yet to get mammogram. Her BP is better controled with lisionpril-CHTZ 20-25 mg PO BID along with norvasc 5 mg daily.  She has ahistory of CVA nad takes aspirin 325 mg  Daily along with plavix 75 mg daily.  She continues to smoke 1 cigarette a day. She uses nicotine gum. She would like to try chantix.  Her constipation and IBS is somewhat better with linzess 72 mcg daily. She has tried 2 pills of 72 mcg which makes her go more often.   She has yet to take it every other day. Her weight  on last visit was 238 it is 227 today    Cerebrovascular Accident   This is a new problem. The current episode started more than 1 month ago. The problem occurs daily. The problem has been waxing and waning. Associated symptoms include fatigue. Pertinent negatives include no abdominal pain, anorexia, arthralgias, change in bowel habit, chest pain, chills, congestion, coughing, diaphoresis, fever, headaches, joint swelling, myalgias, nausea, neck pain, numbness, rash, sore throat, swollen glands, urinary symptoms, vertigo, visual change, vomiting or weakness. Nothing aggravates the symptoms. She has tried nothing for the symptoms. The treatment provided no relief.   Extremity Weakness    The pain is present in the right wrist, right hand, right fingers, right arm, right shoulder, right elbow, right upper leg, right knee, right lower leg, right ankle, right foot and right toes. This is a new problem. The current episode started more than 1 month ago. There has been no history of extremity trauma. The problem occurs daily. The problem has been waxing and waning. The pain is at a severity of 3/10. The pain is mild. Pertinent negatives include no fever, inability to bear weight, itching, joint locking, joint swelling, limited range of motion, numbness, stiffness or tingling. She has tried nothing for the symptoms. The treatment provided no relief. Family history does not include gout or rheumatoid arthritis. There is no history of diabetes, gout, osteoarthritis or rheumatoid arthritis.   Depression   Visit Type: initial  Onset of symptoms: at an unknown time  Progression since onset: gradually worsening  Patient presents with the following symptoms: anhedonia, decreased concentration, depressed mood and fatigue.  Patient is not experiencing: chest pain, choking sensation, compulsions, confusion, dizziness, dry mouth, excessive worry, feelings of hopelessness, feelings of worthlessness, hypersomnia, hyperventilation,  impotence, insomnia, irritability, malaise, memory impairment, muscle tension, nausea, nervousness/anxiety, obsessions, palpitations, panic, psychomotor agitation, psychomotor retardation, restlessness, shortness of breath, suicidal ideas, suicidal planning, thoughts of death, weight gain and weight loss.  Frequency of symptoms: constantly   Severity: moderate   Risk factors: stroke.  Patient has a history of: depression  No history of: anemia, anxiety/panic attacks, arrhythmia, asthma, bipolar disorder, CAD, CHF, chronic lung disease, fibromyalgia, hyperthyroidism, suicide attempt, mental illness and substance abuse  Treatment tried: nothing  Compliance with treatment: variable         The following portions of the patient's history were reviewed and updated as appropriate: allergies, current medications, past family history, past medical history, past social history, past surgical history and problem list.  Patient Active Problem List    Diagnosis   • Obesity [E66.9]   • Need for hepatitis vaccination [Z23]   • History of cardiac arrest [Z86.74]   • Gastroesophageal reflux disease [K21.9]   • Deep vein thrombosis (DVT) of left upper extremity [I82.622]   • Gait instability [R26.81]   • History of CVA (cerebrovascular accident) [Z86.73]   • Essential hypertension [I10]   • Depression [F32.9]   • Cerebrovascular accident (CVA) [I63.9]   • Need for immunization against influenza [Z23]   • Chronic low back pain with bilateral sciatica [M54.41, M54.42, G89.29]   • Chronic back pain [M54.9, G89.29]   • Right hemiparesis [G81.91]   • Slurring of speech [R47.81]   • Hyperlipidemia [E78.5]   • Insomnia [G47.00]   • Constipation [K59.00]   • Iron deficiency anemia [D50.9]   • Hepatitis C antibody positive in blood [R76.8]   • Encounter for screening for diabetes mellitus [Z13.1]   • Encounter for screening mammogram for malignant neoplasm of breast [Z12.31]   • Encounter for screening for malignant neoplasm of colon  [Z12.11]   • Encounter for gynecological examination [Z01.419]   • History of brain tumor [Z87.898]   • Encounter for screening for endocrine disorder [Z13.29]   • Encounter for screening for cardiovascular disorders [Z13.6]   • Tobacco user [Z72.0]   • Need for hepatitis C screening test [Z11.59]   • Tobacco abuse counseling [Z71.6]     Current Outpatient Prescriptions on File Prior to Visit   Medication Sig Dispense Refill   • amLODIPine (NORVASC) 5 MG tablet Take 5 mg by mouth Daily.  1   • aspirin  MG tablet Take 1 tablet by mouth Daily. 30 tablet 11   • atorvastatin (LIPITOR) 80 MG tablet Take 1 tablet by mouth Daily. 30 tablet 11   • clopidogrel (PLAVIX) 75 MG tablet Take 1 tablet by mouth Daily. 30 tablet 11   • ferrous sulfate 325 (65 FE) MG tablet Take 1 tablet by mouth Daily With Breakfast. 30 tablet 11   • lisinopril-hydrochlorothiazide (PRINZIDE,ZESTORETIC) 20-25 MG per tablet Take 2 tablets by mouth Daily. 60 tablet 11   • nicotine (NICODERM CQ) 21 MG/24HR patch Place 1 patch on the skin Daily. 30 patch 11   • nicotine polacrilex (NICORETTE) 4 MG gum Chew 1 each As Needed for Smoking Cessation. 60 each 11   • pantoprazole (PROTONIX) 40 MG EC tablet Take 1 tablet by mouth Daily. 30 tablet 11   • [DISCONTINUED] linaclotide (LINZESS) 72 MCG capsule capsule Take 1 capsule by mouth Every Morning Before Breakfast. 30 capsule 11     No current facility-administered medications on file prior to visit.        Review of Systems   Constitutional: Positive for fatigue. Negative for chills, diaphoresis, fever, irritability, weight gain and weight loss.   HENT: Positive for voice change. Negative for congestion and sore throat.    Eyes: Negative.    Respiratory: Negative.  Negative for cough, choking and shortness of breath.    Cardiovascular: Negative.  Negative for chest pain and palpitations.   Gastrointestinal: Positive for constipation. Negative for abdominal pain, anorexia, change in bowel habit,  "diarrhea, nausea, rectal pain and vomiting.   Endocrine: Negative.    Genitourinary: Negative.  Negative for dysuria, impotence and pelvic pain.   Musculoskeletal: Positive for back pain, extremity weakness and gait problem. Negative for arthralgias, gout, joint swelling, myalgias, neck pain and stiffness.   Skin: Negative.  Negative for itching and rash.   Allergic/Immunologic: Negative.    Neurological: Negative for vertigo, tingling, weakness, numbness and headaches.   Hematological: Negative.    Psychiatric/Behavioral: Positive for decreased concentration. Negative for confusion, substance abuse and suicidal ideas. The patient is not nervous/anxious and does not have insomnia.        Objective    /78   Pulse 83   Temp 98.6 °F (37 °C)   Ht 171.5 cm (67.5\")   Wt 103 kg (227 lb 12.8 oz)   SpO2 97%   BMI 35.15 kg/m²     /78   Pulse 83   Temp 98.6 °F (37 °C)   Ht 171.5 cm (67.5\")   Wt 103 kg (227 lb 12.8 oz)   SpO2 97%   BMI 35.15 kg/m²           Chemistry        Component Value Date/Time     02/13/2018 0928    K 3.8 02/13/2018 0928     02/13/2018 0928    CO2 20.0 (L) 02/13/2018 0928    BUN 15 02/13/2018 0928    CREATININE 1.12 (H) 02/13/2018 0928        Component Value Date/Time    CALCIUM 9.9 02/13/2018 0928    ALKPHOS 131 (H) 02/13/2018 0928    AST 16 02/13/2018 0928    ALT 22 02/13/2018 0928    BILITOT 0.1 (L) 02/13/2018 0928        Lab Results   Component Value Date    WBC 5.58 02/13/2018    HGB 9.8 (L) 02/13/2018    HCT 28.5 (L) 02/13/2018    HCT 28.5 (L) 02/13/2018    MCV 77.0 (L) 02/13/2018     02/13/2018     Lab Results   Component Value Date    CHOL 149 02/13/2018    TRIG 118 02/13/2018    HDL 63 02/13/2018    LDL 54 02/13/2018     Lab Results   Component Value Date    HGBA1C 5.6 02/13/2018     Lab Results   Component Value Date    TSH 0.540 09/20/2017     Office Visit on 01/26/2018   Component Date Value Ref Range Status   • WBC 02/13/2018 5.58  3.20 - 9.80 " 10*3/mm3 Final   • RBC 02/13/2018 3.70* 3.77 - 5.16 10*6/mm3 Final   • Hemoglobin 02/13/2018 9.8* 12.0 - 15.5 g/dL Final   • Hematocrit 02/13/2018 28.5* 35.0 - 45.0 % Final   • MCV 02/13/2018 77.0* 80.0 - 98.0 fL Final   • MCH 02/13/2018 26.5  26.5 - 34.0 pg Final   • MCHC 02/13/2018 34.4  31.4 - 36.0 g/dL Final   • RDW 02/13/2018 16.9* 11.5 - 14.5 % Final   • RDW-SD 02/13/2018 47.9* 36.4 - 46.3 fl Final   • MPV 02/13/2018 11.1  8.0 - 12.0 fL Final   • Platelets 02/13/2018 164  150 - 450 10*3/mm3 Final   • Neutrophil % 02/13/2018 54.7  37.0 - 80.0 % Final   • Lymphocyte % 02/13/2018 34.6  10.0 - 50.0 % Final   • Monocyte % 02/13/2018 7.2  0.0 - 12.0 % Final   • Eosinophil % 02/13/2018 2.9  0.0 - 7.0 % Final   • Basophil % 02/13/2018 0.4  0.0 - 2.0 % Final   • Immature Grans % 02/13/2018 0.2  0.0 - 0.5 % Final   • Neutrophils, Absolute 02/13/2018 3.06  2.00 - 8.60 10*3/mm3 Final   • Lymphocytes, Absolute 02/13/2018 1.93  0.60 - 4.20 10*3/mm3 Final   • Monocytes, Absolute 02/13/2018 0.40  0.00 - 0.90 10*3/mm3 Final   • Eosinophils, Absolute 02/13/2018 0.16  0.00 - 0.70 10*3/mm3 Final   • Basophils, Absolute 02/13/2018 0.02  0.00 - 0.20 10*3/mm3 Final   • Immature Grans, Absolute 02/13/2018 0.01  0.00 - 0.02 10*3/mm3 Final   • Glucose 02/13/2018 121* 60 - 100 mg/dL Final   • BUN 02/13/2018 15  7 - 21 mg/dL Final   • Creatinine 02/13/2018 1.12* 0.50 - 1.00 mg/dL Final   • Sodium 02/13/2018 143  137 - 145 mmol/L Final   • Potassium 02/13/2018 3.8  3.5 - 5.1 mmol/L Final   • Chloride 02/13/2018 109  95 - 110 mmol/L Final   • CO2 02/13/2018 20.0* 22.0 - 31.0 mmol/L Final   • Calcium 02/13/2018 9.9  8.4 - 10.2 mg/dL Final   • Total Protein 02/13/2018 8.1  6.3 - 8.6 g/dL Final   • Albumin 02/13/2018 4.00  3.40 - 4.80 g/dL Final   • ALT (SGPT) 02/13/2018 22  9 - 52 U/L Final   • AST (SGOT) 02/13/2018 16  14 - 36 U/L Final   • Alkaline Phosphatase 02/13/2018 131* 38 - 126 U/L Final   • Total Bilirubin 02/13/2018 0.1* 0.2 - 1.3  mg/dL Final   • eGFR   Amer 02/13/2018 60  45 - 104 mL/min/1.73 Final   • Globulin 02/13/2018 4.1* 2.3 - 3.5 gm/dL Final   • A/G Ratio 02/13/2018 1.0* 1.1 - 1.8 g/dL Final   • BUN/Creatinine Ratio 02/13/2018 13.4  7.0 - 25.0 Final   • Anion Gap 02/13/2018 14.0  5.0 - 15.0 mmol/L Final   • Hemoglobin A1C 02/13/2018 5.6  4 - 5.6 % Final   • Total Cholesterol 02/13/2018 149  0 - 199 mg/dL Final   • Triglycerides 02/13/2018 118  20 - 199 mg/dL Final   • HDL Cholesterol 02/13/2018 63  60 - 200 mg/dL Final   • LDL Cholesterol  02/13/2018 54  1 - 129 mg/dL Final   • LDL/HDL Ratio 02/13/2018 0.99  0.00 - 3.22 Final   • 25 Hydroxy, Vitamin D 02/13/2018 <12.8* 30.0 - 100.0 ng/ml Final   • Ferritin 02/13/2018 225.00  11.10 - 264.00 ng/mL Final   • Iron 02/13/2018 78  37 - 170 mcg/dL Final   • TIBC 02/13/2018 274  265 - 497 mcg/dL Final   • Iron Saturation 02/13/2018 28  15 - 50 % Final       Physical Exam   Constitutional: She is oriented to person, place, and time. She appears well-developed and well-nourished. No distress.   Pt has speech issues    HENT:   Head: Normocephalic and atraumatic.   Right Ear: External ear normal.   Left Ear: External ear normal.   Eyes: Conjunctivae and EOM are normal. Pupils are equal, round, and reactive to light. Right eye exhibits no discharge. Left eye exhibits no discharge. No scleral icterus.   Neck: Normal range of motion. Neck supple. No JVD present. No tracheal deviation present. No thyromegaly present.   Cardiovascular: Normal rate, regular rhythm and normal heart sounds.    Pulmonary/Chest: Effort normal and breath sounds normal. No stridor. No respiratory distress. She has no wheezes.   Abdominal: Soft. She exhibits no distension and no mass. There is no tenderness. There is no rebound and no guarding. No hernia.   Musculoskeletal: She exhibits tenderness. She exhibits no edema or deformity.        Lumbar back: She exhibits decreased range of motion, tenderness, bony  tenderness, pain and spasm.   Motor strength 4/5 in upper and lower right extremity     Gait instability    Pt is high fall risk  Get up and go test >10 seconds     Lymphadenopathy:     She has no cervical adenopathy.   Neurological: She is alert and oriented to person, place, and time. She has normal reflexes. No cranial nerve deficit. Coordination normal.   Skin: Skin is warm and dry. No rash noted. She is not diaphoretic. No erythema. No pallor.   Psychiatric: She has a normal mood and affect. Her behavior is normal.   Nursing note and vitals reviewed.      Assessment/Plan   Problems Addressed this Visit        Cardiovascular and Mediastinum    Cerebrovascular accident (CVA)    Hyperlipidemia    Essential hypertension       Digestive    Constipation    Gastroesophageal reflux disease       Hematopoietic and Hemostatic    Iron deficiency anemia       Other    Tobacco user    Hepatitis C antibody positive in blood    Gait instability    History of CVA (cerebrovascular accident)      Other Visit Diagnoses     Encounter for screening for malignant neoplasm of breast    -  Primary    Relevant Orders    Mammo Screening Bilateral With CAD      -for hypertension - BP  at goal today. Continue on norvasc 5 mg PO q daily .She is to continue lisinopril-HCTZ 20-25 mg PO daily.  -pt advised to go to ER or call 911 if symptoms get worrisome or severe  - will reschedule mammogram screening   -GERD - continue  on protonix 40 mg daily. Gave food choices for GERD to go home with . Gave drug information to go home with. Pt may need EGD in near future  - tobacco user- counseled pt to stop smoking. Counseled >5 minutes pt will try chantix. Drug information given  - DVT of LUE - anticoagulation not indicated at this time. Pt advised to go to ER or call 911 if developing chest pain, shortness of breath or dizziness. Pt is on plavix  - prediabetes - prediabetes diet given   - pt advised to go and call  Gastroenterology for colonoscopy  screening and Hepatitis C  - pt advised to call for reappt with DAREK Parra for pap smear  - for history of CVA /gait instability/slurring of speech- pt is seeing Neurologist in Neurologist next month. Clinically stable   - for hyperlipidemia - lipitor 80 mg PO qhs recheck lipid panel   - depression - stable. Pt is off celexa now  - for constipation - better with linzess but will go up from 72 to 144 mcg every other day. Samples provided today.   -obesity -  Pt lost 10 lbs since last visit   - for chronic back patient adivsed to call appt with Pain Management soon   -recheck in 6 weeks  To see how pt does on new dosage of linzess and chantix  -get labwork on next visit   - gave pt prescription for walker with chair   -advised pt to be safe and call with any questions or concerns        This document has been electronically signed by [place] MD on June 1, 2018 2:12 PM                 Review of Systems   Constitutional: Positive for fatigue. Negative for chills, diaphoresis, fever, irritability, unexpected weight gain and unexpected weight loss.   HENT: Positive for voice change. Negative for congestion and sore throat.    Eyes: Negative.    Respiratory: Negative.  Negative for cough, choking and shortness of breath.    Cardiovascular: Negative.  Negative for chest pain and palpitations.   Gastrointestinal: Positive for constipation. Negative for abdominal pain, anorexia, change in bowel habit, diarrhea, nausea, rectal pain and vomiting.   Endocrine: Negative.    Genitourinary: Negative.  Negative for dysuria, impotence and pelvic pain.   Musculoskeletal: Positive for back pain, extremity weakness and gait problem. Negative for arthralgias, gout, joint swelling, myalgias, neck pain and stiffness.   Skin: Negative.  Negative for itching and rash.   Allergic/Immunologic: Negative.    Neurological: Negative for vertigo, tingling, weakness, numbness and confusion.   Hematological: Negative.     Psychiatric/Behavioral: Positive for decreased concentration and depressed mood. Negative for substance abuse and suicidal ideas. The patient is not nervous/anxious and does not have insomnia.        Physical Exam   Constitutional: She is oriented to person, place, and time. She appears well-developed and well-nourished. No distress.   Pt has speech issues    HENT:   Head: Normocephalic and atraumatic.   Right Ear: External ear normal.   Left Ear: External ear normal.   Eyes: Conjunctivae and EOM are normal. Pupils are equal, round, and reactive to light. Right eye exhibits no discharge. Left eye exhibits no discharge. No scleral icterus.   Neck: Normal range of motion. Neck supple. No JVD present. No tracheal deviation present. No thyromegaly present.   Cardiovascular: Normal rate, regular rhythm and normal heart sounds.    Pulmonary/Chest: Effort normal and breath sounds normal. No stridor. No respiratory distress. She has no wheezes.   Abdominal: Soft. She exhibits no distension and no mass. There is no tenderness. There is no rebound and no guarding. No hernia.   Musculoskeletal: She exhibits tenderness. She exhibits no edema or deformity.        Lumbar back: She exhibits decreased range of motion, tenderness, bony tenderness, pain and spasm.   Motor strength 4/5 in upper and lower right extremity     Gait instability    Pt is high fall risk  Get up and go test >10 seconds     Lymphadenopathy:     She has no cervical adenopathy.   Neurological: She is alert and oriented to person, place, and time. She has normal reflexes. No cranial nerve deficit. Coordination normal.   Skin: Skin is warm and dry. No rash noted. She is not diaphoretic. No erythema. No pallor.   Psychiatric: She has a normal mood and affect. Her behavior is normal.   Nursing note and vitals reviewed.

## 2018-06-01 NOTE — PATIENT INSTRUCTIONS
Call Gastroenterology for appt to reschedule Colonscopy screening.        Also call and make appt for mammogram screening Varenicline oral tablets  What is this medicine?  VARENICLINE (sharon EN i kleen) is used to help people quit smoking. It can reduce the symptoms caused by stopping smoking. It is used with a patient support program recommended by your physician.  This medicine may be used for other purposes; ask your health care provider or pharmacist if you have questions.  COMMON BRAND NAME(S): Jonnyx  What should I tell my health care provider before I take this medicine?  They need to know if you have any of these conditions:  -bipolar disorder, depression, schizophrenia or other mental illness  -heart disease  -if you often drink alcohol  -kidney disease  -peripheral vascular disease  -seizures  -stroke  -suicidal thoughts, plans, or attempt; a previous suicide attempt by you or a family member  -an unusual or allergic reaction to varenicline, other medicines, foods, dyes, or preservatives  -pregnant or trying to get pregnant  -breast-feeding  How should I use this medicine?  Take this medicine by mouth after eating. Take with a full glass of water. Follow the directions on the prescription label. Take your doses at regular intervals. Do not take your medicine more often than directed.  There are 3 ways you can use this medicine to help you quit smoking; talk to your health care professional to decide which plan is right for you:  1) you can choose a quit date and start this medicine 1 week before the quit date, or,  2) you can start taking this medicine before you choose a quit date, and then pick a quit date between day 8 and 35 days of treatment, or,  3) if you are not sure that you are able or willing to quit smoking right away, start taking this medicine and slowly decrease the amount you smoke as directed by your health care professional with the goal of being cigarette-free by week 12 of  treatment.  Stick to your plan; ask about support groups or other ways to help you remain cigarette-free. If you are motivated to quit smoking and did not succeed during a previous attempt with this medicine for reasons other than side effects, or if you returned to smoking after this treatment, speak with your health care professional about whether another course of this medicine may be right for you.  A special MedGuide will be given to you by the pharmacist with each prescription and refill. Be sure to read this information carefully each time.  Talk to your pediatrician regarding the use of this medicine in children. This medicine is not approved for use in children.  Overdosage: If you think you have taken too much of this medicine contact a poison control center or emergency room at once.  NOTE: This medicine is only for you. Do not share this medicine with others.  What if I miss a dose?  If you miss a dose, take it as soon as you can. If it is almost time for your next dose, take only that dose. Do not take double or extra doses.  What may interact with this medicine?  -alcohol or any product that contains alcohol  -insulin  -other stop smoking aids  -theophylline  -warfarin  This list may not describe all possible interactions. Give your health care provider a list of all the medicines, herbs, non-prescription drugs, or dietary supplements you use. Also tell them if you smoke, drink alcohol, or use illegal drugs. Some items may interact with your medicine.  What should I watch for while using this medicine?  Visit your doctor or health care professional for regular check ups. Ask for ongoing advice and encouragement from your doctor or healthcare professional, friends, and family to help you quit. If you smoke while on this medication, quit again  Your mouth may get dry. Chewing sugarless gum or sucking hard candy, and drinking plenty of water may help. Contact your doctor if the problem does not go away or  is severe.  You may get drowsy or dizzy. Do not drive, use machinery, or do anything that needs mental alertness until you know how this medicine affects you. Do not stand or sit up quickly, especially if you are an older patient. This reduces the risk of dizzy or fainting spells.  Sleepwalking can happen during treatment with this medicine, and can sometimes lead to behavior that is harmful to you, other people, or property. Stop taking this medicine and tell your doctor if you start sleepwalking or have other unusual sleep-related activity.  Decrease the amount of alcoholic beverages that you drink during treatment with this medicine until you know if this medicine affects your ability to tolerate alcohol. Some people have experienced increased drunkenness (intoxication), unusual or sometimes aggressive behavior, or no memory of things that have happened (amnesia) during treatment with this medicine.  The use of this medicine may increase the chance of suicidal thoughts or actions. Pay special attention to how you are responding while on this medicine. Any worsening of mood, or thoughts of suicide or dying should be reported to your health care professional right away.  What side effects may I notice from receiving this medicine?  Side effects that you should report to your doctor or health care professional as soon as possible:  -allergic reactions like skin rash, itching or hives, swelling of the face, lips, tongue, or throat  -acting aggressive, being angry or violent, or acting on dangerous impulses  -breathing problems  -changes in vision  -chest pain or chest tightness  -confusion, trouble speaking or understanding  -new or worsening depression, anxiety, or panic attacks  -extreme increase in activity and talking (monica)  -fast, irregular heartbeat  -feeling faint or lightheaded, falls  -fever  -pain in legs when walking  -problems with balance, talking, walking  -redness, blistering, peeling or loosening of  the skin, including inside the mouth  -ringing in ears  -seeing or hearing things that aren't there (hallucinations)  -seizures  -sleepwalking  -sudden numbness or weakness of the face, arm or leg  -thoughts about suicide or dying, or attempts to commit suicide  -trouble passing urine or change in the amount of urine  -unusual bleeding or bruising  -unusually weak or tired  Side effects that usually do not require medical attention (report to your doctor or health care professional if they continue or are bothersome):  -constipation  -headache  -nausea, vomiting  -strange dreams  -stomach gas  -trouble sleeping  This list may not describe all possible side effects. Call your doctor for medical advice about side effects. You may report side effects to FDA at 8-619-FDA-8168.  Where should I keep my medicine?  Keep out of the reach of children.  Store at room temperature between 15 and 30 degrees C (59 and 86 degrees F). Throw away any unused medicine after the expiration date.  NOTE: This sheet is a summary. It may not cover all possible information. If you have questions about this medicine, talk to your doctor, pharmacist, or health care provider.  © 2018 [employer] (2016-09-01 16:14:23)

## 2018-07-26 RX ORDER — CLOPIDOGREL BISULFATE 75 MG/1
75 TABLET ORAL DAILY
Qty: 30 TABLET | Refills: 11 | Status: SHIPPED | OUTPATIENT
Start: 2018-07-26 | End: 2018-11-20 | Stop reason: SDUPTHER

## 2018-11-06 RX ORDER — LISINOPRIL AND HYDROCHLOROTHIAZIDE 25; 20 MG/1; MG/1
2 TABLET ORAL DAILY
Qty: 60 TABLET | Refills: 11 | Status: SHIPPED | OUTPATIENT
Start: 2018-11-06 | End: 2018-11-12 | Stop reason: SDUPTHER

## 2018-11-08 RX ORDER — LISINOPRIL AND HYDROCHLOROTHIAZIDE 25; 20 MG/1; MG/1
2 TABLET ORAL DAILY
Qty: 60 TABLET | Refills: 11 | Status: CANCELLED | OUTPATIENT
Start: 2018-11-08

## 2018-11-13 RX ORDER — LISINOPRIL AND HYDROCHLOROTHIAZIDE 25; 20 MG/1; MG/1
2 TABLET ORAL DAILY
Qty: 60 TABLET | Refills: 11 | Status: SHIPPED | OUTPATIENT
Start: 2018-11-13 | End: 2018-11-20 | Stop reason: SDUPTHER

## 2018-11-18 NOTE — PROGRESS NOTES
Subjective   Conniemarline Licea is a 63 y.o. female.     Problem List  1. Essential Hypertension   2. Morbid Obesity  BMI >40  3. H/O Brain Tumor/Meningioma   4. GERD  5. Constipation  6. Chronic back pain, DDD of spine  7. Hep C antibody positive/chronic hepatitis C  8.  Vitamin D deficiency   9.  Microcytic Anemia. Hemoglobin C. Iron deficiency anemia  10. H/O Low TSH/subclinical hyperthyroidism   11. H/O CVA. Restricted diffusion in parietal supraventricular white matter bilaterally more on left than the  Right.    12. Hyperlipidemia ASCVD >5%  13. Hemiparesis on right side   14. Depression  15. Proximal LUE DVT  16. Prediabetes       PSHX  1. H/O Brain Tumors sp Surgical Removal 2017 at [facility], [place]   2. Right ankle surgery in   3. Emergency C section 40 years ago       SocialHx  -tobacco smoker.  Has been smoking for 10 years  Smokes once a day   -occasional alcohol drinker   -No illicit drugs. 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 Is here for recheck Pt has history of Left CVA and brain surgery. Since her CVA she has been having issues with right hemiparesis and speech issues.  She currently takes aspirin 81 mg PO q daily along with lipitor 20 mg PO qhs for hyperlipidemia. For hypertension pt is on lisinopril-HCTZ 20-25 mg PO BID along with norvasc 5 mg PO q daily.  For iron deficiency pt is taking iron pill 325 mg PO q daily.  On last visit pt was started on linzess 72 mcg PO q daily for constipation that has improved since taking it.  Pt missed appt to get pap smear done.  Also has yet to get colonoscopy screening. Pt is also positive for Hep C.   Pt recently saw Neurologist who started pt on aspirin 325 mg PO q daily from 81 mg  daily and also plavix 75 mg PO q daily. Pt also had lipitor increased from 20 mg to 80 mg PO qhs.   Pt also was ordered PT/OT but needs a new order since it was ordered in [place] fo She has gait instability and speech issues.   She continues to take iron pill for iron deficiency anemia. Pt also has been having issues with depression since her CVA and surgery. She has lost interest in the things she likes to do for several weeks.   She denies any suicidal or homicidal ideations.  She is not on medication currently.  Pt's hypertension is better controlled with addition of norvasc 5 mg PO q daily. Pt continues to take lisinopril-HCTZ 20/25 mg PO BID. She is compliant .    Pt was recently admitted to [facility] after presenting to ED with  AMS and subsequent PEA arrest. Pt had CPR performed.   Her hemodynamic improved with IV antbiotics, fluids and pressure.  Septic workup was negative.  An EKG and echocardiogram was done that showed structural or electrical heart disease . PT/OT evaluted pt and pt was discharged on 11/21/17. Neurologist was consulted and had low suspicion for actue stroke causing her symptoms.  Pt also was found to have an upper extremity DVT that was line associated.  A duplex US of LUE was done.  It was not proximal enough to warrant systemic anticogulation. It was recommended pt get outapatient sleep study and see PT/OT regarding gait issues. She is wearing an outpt Holter monitor and she is to follow up with Neurologist and Cardiologist soon. Pt reports no chest pain or shortness of breath today.  She denies any headaches.  She has no home health set up yet.  Pt is a high fall risk Pt has seen Gastroenterologist  And colonoscopy is on hold now.  She does have an appt soon with [facility] . She also has appt with Sleep Medicine regarding sleep study with Dr. Ybarra.  For pap smear pt had appt to see DAREK Parra but she canceled appt Pt has finished PT[facility]OT at [facility]. She  currently goes to Speech Therapy stillPt also has chronic hepatitis C/HEP C antibody positive.  She is due for 2nd Hep B vaccination today Blood pressure lately has  No been at goal.  She continues to take lisinopril-HCTZ 20-25 mg daily along with novasc 5 mg PO q daily.  Her BP is >180/100 today.  She has been feeling fatigued lately  She has not been feeling well.  She is not feverish Pt has stopped smoking. She is using nicorette gum.Pt recently had her aspirin stopped by Home Health.  Will restart again on aspirin 325 mg daily Pt has been having reflux issues and heartburn. She has yet to get EGD. That is currently on hold. It occurs everyday . She is on protonix  40 mg PO q daily.      4/4/18 pt is here for followup and recheck She has missed several appt with DAREK Parra for pap smear and Gastroenteroogy for colonoscopy screening. She has also missed appt with sleep mediciine. BP is good with norvasc 5 mg daily and lisinopril-HCTZ 20-25 mg daily.  She continues to take aspirin 325 mg daily and plavix for stroke. She continue on lipitor for hyperlipidemia. She also is taking linzess which helps with constipation.  Overall she has been feeling better. She continues to quit smoking and uses nicotine gum. She also takes protonix for GERD. She has yet to get mammogram. Pt lost 8 lbs since last visit     6/1/18 pt is here for recheck . Pt missed appt for colonoscopy screening with Gastroenterologist she will call for an appt. Also she has yet to get mammogram. Her BP is better controled with lisionpril-CHTZ 20-25 mg PO BID along with norvasc 5 mg daily.  She has ahistory of CVA nad takes aspirin 325 mg  Daily along with plavix 75 mg daily.  She continues to smoke 1 cigarette a day. She uses nicotine gum. She would like to try chantix.  Her constipation and IBS is somewhat better with linzess 72 mcg daily. She has tried 2 pills of 72 mcg which makes her go more often.   She has yet to take it every other day.  Her weight on last visit was 238 it is 227 today    11/20/18 pt Iis here for recheck and followup. She has history of hypertension and CVA . She is due for new labwork.  She also sees a Neurologist. She was advised to quit smoking. She continues to smoke 1-2 cigarettes a day. She states she was not feeling well recently because she ran out of her medication. She has been having transporation issues as well. She has been having throbbing headaches that have improved. Her headaches have improved.  She has not had headache since her surgery earlier this year for brain tumor removal.  She also sees a Neurologist in [place].. She is also due for mammogram. Pap smear and colonoscopy appt    Cerebrovascular Accident   This is a new problem. The current episode started more than 1 month ago. The problem occurs daily. The problem has been waxing and waning. Associated symptoms include fatigue. Pertinent negatives include no abdominal pain, anorexia, arthralgias, change in bowel habit, chest pain, chills, congestion, coughing, diaphoresis, fever, headaches, joint swelling, myalgias, nausea, neck pain, numbness, rash, sore throat, swollen glands, urinary symptoms, vertigo, visual change, vomiting or weakness. Nothing aggravates the symptoms. She has tried nothing for the symptoms. The treatment provided no relief.   Extremity Weakness    The pain is present in the right wrist, right hand, right fingers, right arm, right shoulder, right elbow, right upper leg, right knee, right lower leg, right ankle, right foot and right toes. This is a new problem. The current episode started more than 1 month ago. There has been no history of extremity trauma. The problem occurs daily. The problem has been waxing and waning. The pain is at a severity of 3/10. The pain is mild. Pertinent negatives include no fever, inability to bear weight, itching, joint locking, joint swelling, limited range of motion, numbness, stiffness or tingling.  She has tried nothing for the symptoms. The treatment provided no relief. Family history does not include gout or rheumatoid arthritis. There is no history of diabetes, gout, osteoarthritis or rheumatoid arthritis.   Depression   Visit Type: initial  Onset of symptoms: at an unknown time  Progression since onset: gradually worsening  Patient presents with the following symptoms: anhedonia, decreased concentration, depressed mood and fatigue.  Patient is not experiencing: chest pain, choking sensation, compulsions, confusion, dizziness, dry mouth, excessive worry, feelings of hopelessness, feelings of worthlessness, hypersomnia, hyperventilation, impotence, insomnia, irritability, malaise, memory impairment, muscle tension, nausea, nervousness/anxiety, obsessions, palpitations, panic, psychomotor agitation, psychomotor retardation, restlessness, shortness of breath, suicidal ideas, suicidal planning, thoughts of death, weight gain and weight loss.  Frequency of symptoms: constantly   Severity: moderate   Risk factors: stroke.  Patient has a history of: depression  No history of: anemia, anxiety/panic attacks, arrhythmia, asthma, bipolar disorder, CAD, CHF, chronic lung disease, fibromyalgia, hyperthyroidism, suicide attempt, mental illness and substance abuse  Treatment tried: nothing  Compliance with treatment: variable         The following portions of the patient's history were reviewed and updated as appropriate: allergies, current medications, past family history, past medical history, past social history, past surgical history and problem list.  Patient Active Problem List    Diagnosis   • Obesity [E66.9]   • Need for hepatitis vaccination [Z23]   • History of cardiac arrest [Z86.74]   • Gastroesophageal reflux disease [K21.9]   • Deep vein thrombosis (DVT) of left upper extremity (CMS/HCC) [I82.622]   • Gait instability [R26.81]   • History of CVA (cerebrovascular accident) [Z86.73]   • Essential hypertension  [I10]   • Depression [F32.9]   • Cerebrovascular accident (CVA) (CMS/HCC) [I63.9]   • Need for immunization against influenza [Z23]   • Chronic low back pain with bilateral sciatica [M54.41, M54.42, G89.29]   • Chronic back pain [M54.9, G89.29]   • Right hemiparesis (CMS/HCC) [G81.91]   • Slurring of speech [R47.81]   • Hyperlipidemia [E78.5]   • Insomnia [G47.00]   • Constipation [K59.00]   • Iron deficiency anemia [D50.9]   • Hepatitis C antibody positive in blood [R76.8]   • Encounter for screening for diabetes mellitus [Z13.1]   • Encounter for screening mammogram for malignant neoplasm of breast [Z12.31]   • Encounter for screening for malignant neoplasm of colon [Z12.11]   • Encounter for gynecological examination [Z01.419]   • History of brain tumor [Z87.898]   • Encounter for screening for endocrine disorder [Z13.29]   • Encounter for screening for cardiovascular disorders [Z13.6]   • Tobacco user [Z72.0]   • Need for hepatitis C screening test [Z11.59]   • Tobacco abuse counseling [Z71.6]     Current Outpatient Medications on File Prior to Visit   Medication Sig Dispense Refill   • amLODIPine (NORVASC) 5 MG tablet Take 5 mg by mouth Daily.  1   • aspirin  MG tablet Take 1 tablet by mouth Daily. 30 tablet 11   • atorvastatin (LIPITOR) 80 MG tablet Take 1 tablet by mouth Daily. 30 tablet 11   • clopidogrel (PLAVIX) 75 MG tablet Take 1 tablet by mouth Daily. 30 tablet 11   • ferrous sulfate 325 (65 FE) MG tablet Take 1 tablet by mouth Daily With Breakfast. 30 tablet 11   • linaclotide (LINZESS) 145 MCG capsule capsule Take 1 capsule by mouth Every Other Day. 30 capsule 3   • lisinopril-hydrochlorothiazide (PRINZIDE,ZESTORETIC) 20-25 MG per tablet Take 2 tablets by mouth Daily. 60 tablet 11   • nicotine (NICODERM CQ) 21 MG/24HR patch Place 1 patch on the skin Daily. 30 patch 11   • nicotine polacrilex (NICORETTE) 4 MG gum Chew 1 each As Needed for Smoking Cessation. 60 each 11   • pantoprazole (PROTONIX)  "40 MG EC tablet Take 1 tablet by mouth Daily. 30 tablet 11   • varenicline (CHANTIX) 0.5 MG tablet Take 0.5 mg once a day for days 1-3 then 0.5 mg twice a day for days 4-7 then 1 mg twice a day thereafter 90 tablet 3     No current facility-administered medications on file prior to visit.        Review of Systems   Constitutional: Positive for fatigue. Negative for chills, diaphoresis, fever, irritability, weight gain and weight loss.   HENT: Positive for voice change. Negative for congestion and sore throat.    Eyes: Negative.    Respiratory: Negative.  Negative for cough, choking and shortness of breath.    Cardiovascular: Negative.  Negative for chest pain and palpitations.   Gastrointestinal: Positive for constipation. Negative for abdominal pain, anorexia, change in bowel habit, diarrhea, nausea, rectal pain and vomiting.   Endocrine: Negative.    Genitourinary: Negative.  Negative for dysuria, impotence and pelvic pain.   Musculoskeletal: Positive for back pain, extremity weakness and gait problem. Negative for arthralgias, gout, joint swelling, myalgias, neck pain and stiffness.   Skin: Negative.  Negative for itching and rash.   Allergic/Immunologic: Negative.    Neurological: Negative for vertigo, tingling, weakness, numbness and headaches.   Hematological: Negative.    Psychiatric/Behavioral: Positive for decreased concentration. Negative for confusion, substance abuse and suicidal ideas. The patient is not nervous/anxious and does not have insomnia.        Objective    /78   Pulse 97   Temp 98.8 °F (37.1 °C)   Ht 171.5 cm (67.5\")   Wt 102 kg (225 lb 6.4 oz)   SpO2 98%   BMI 34.78 kg/m²     Ht 171.5 cm (67.5\")   BMI 35.15 kg/m²     Ht 171.5 cm (67.5\")   BMI 35.15 kg/m²           Chemistry        Component Value Date/Time     02/13/2018 0928    K 3.8 02/13/2018 0928     02/13/2018 0928    CO2 20.0 (L) 02/13/2018 0928    BUN 15 02/13/2018 0928    CREATININE 1.12 (H) 02/13/2018 0928 "        Component Value Date/Time    CALCIUM 9.9 02/13/2018 0928    ALKPHOS 131 (H) 02/13/2018 0928    AST 16 02/13/2018 0928    ALT 22 02/13/2018 0928    BILITOT 0.1 (L) 02/13/2018 0928        Lab Results   Component Value Date    WBC 5.58 02/13/2018    HGB 9.8 (L) 02/13/2018    HCT 28.5 (L) 02/13/2018    HCT 28.5 (L) 02/13/2018    MCV 77.0 (L) 02/13/2018     02/13/2018     Lab Results   Component Value Date    CHOL 149 02/13/2018    TRIG 118 02/13/2018    HDL 63 02/13/2018    LDL 54 02/13/2018     Lab Results   Component Value Date    HGBA1C 5.6 02/13/2018     Lab Results   Component Value Date    TSH 0.540 09/20/2017     Office Visit on 01/26/2018   Component Date Value Ref Range Status   • WBC 02/13/2018 5.58  3.20 - 9.80 10*3/mm3 Final   • RBC 02/13/2018 3.70* 3.77 - 5.16 10*6/mm3 Final   • Hemoglobin 02/13/2018 9.8* 12.0 - 15.5 g/dL Final   • Hematocrit 02/13/2018 28.5* 35.0 - 45.0 % Final   • MCV 02/13/2018 77.0* 80.0 - 98.0 fL Final   • MCH 02/13/2018 26.5  26.5 - 34.0 pg Final   • MCHC 02/13/2018 34.4  31.4 - 36.0 g/dL Final   • RDW 02/13/2018 16.9* 11.5 - 14.5 % Final   • RDW-SD 02/13/2018 47.9* 36.4 - 46.3 fl Final   • MPV 02/13/2018 11.1  8.0 - 12.0 fL Final   • Platelets 02/13/2018 164  150 - 450 10*3/mm3 Final   • Neutrophil % 02/13/2018 54.7  37.0 - 80.0 % Final   • Lymphocyte % 02/13/2018 34.6  10.0 - 50.0 % Final   • Monocyte % 02/13/2018 7.2  0.0 - 12.0 % Final   • Eosinophil % 02/13/2018 2.9  0.0 - 7.0 % Final   • Basophil % 02/13/2018 0.4  0.0 - 2.0 % Final   • Immature Grans % 02/13/2018 0.2  0.0 - 0.5 % Final   • Neutrophils, Absolute 02/13/2018 3.06  2.00 - 8.60 10*3/mm3 Final   • Lymphocytes, Absolute 02/13/2018 1.93  0.60 - 4.20 10*3/mm3 Final   • Monocytes, Absolute 02/13/2018 0.40  0.00 - 0.90 10*3/mm3 Final   • Eosinophils, Absolute 02/13/2018 0.16  0.00 - 0.70 10*3/mm3 Final   • Basophils, Absolute 02/13/2018 0.02  0.00 - 0.20 10*3/mm3 Final   • Immature Grans, Absolute 02/13/2018  0.01  0.00 - 0.02 10*3/mm3 Final   • Glucose 02/13/2018 121* 60 - 100 mg/dL Final   • BUN 02/13/2018 15  7 - 21 mg/dL Final   • Creatinine 02/13/2018 1.12* 0.50 - 1.00 mg/dL Final   • Sodium 02/13/2018 143  137 - 145 mmol/L Final   • Potassium 02/13/2018 3.8  3.5 - 5.1 mmol/L Final   • Chloride 02/13/2018 109  95 - 110 mmol/L Final   • CO2 02/13/2018 20.0* 22.0 - 31.0 mmol/L Final   • Calcium 02/13/2018 9.9  8.4 - 10.2 mg/dL Final   • Total Protein 02/13/2018 8.1  6.3 - 8.6 g/dL Final   • Albumin 02/13/2018 4.00  3.40 - 4.80 g/dL Final   • ALT (SGPT) 02/13/2018 22  9 - 52 U/L Final   • AST (SGOT) 02/13/2018 16  14 - 36 U/L Final   • Alkaline Phosphatase 02/13/2018 131* 38 - 126 U/L Final   • Total Bilirubin 02/13/2018 0.1* 0.2 - 1.3 mg/dL Final   • eGFR   Amer 02/13/2018 60  45 - 104 mL/min/1.73 Final   • Globulin 02/13/2018 4.1* 2.3 - 3.5 gm/dL Final   • A/G Ratio 02/13/2018 1.0* 1.1 - 1.8 g/dL Final   • BUN/Creatinine Ratio 02/13/2018 13.4  7.0 - 25.0 Final   • Anion Gap 02/13/2018 14.0  5.0 - 15.0 mmol/L Final   • Hemoglobin A1C 02/13/2018 5.6  4 - 5.6 % Final   • Total Cholesterol 02/13/2018 149  0 - 199 mg/dL Final   • Triglycerides 02/13/2018 118  20 - 199 mg/dL Final   • HDL Cholesterol 02/13/2018 63  60 - 200 mg/dL Final   • LDL Cholesterol  02/13/2018 54  1 - 129 mg/dL Final   • LDL/HDL Ratio 02/13/2018 0.99  0.00 - 3.22 Final   • 25 Hydroxy, Vitamin D 02/13/2018 <12.8* 30.0 - 100.0 ng/ml Final   • Ferritin 02/13/2018 225.00  11.10 - 264.00 ng/mL Final   • Iron 02/13/2018 78  37 - 170 mcg/dL Final   • TIBC 02/13/2018 274  265 - 497 mcg/dL Final   • Iron Saturation 02/13/2018 28  15 - 50 % Final       Physical Exam   Constitutional: She is oriented to person, place, and time. She appears well-developed and well-nourished. No distress.   Pt has speech issues    HENT:   Head: Normocephalic and atraumatic.   Right Ear: External ear normal.   Left Ear: External ear normal.   Eyes: Conjunctivae and EOM are  normal. Pupils are equal, round, and reactive to light. Right eye exhibits no discharge. Left eye exhibits no discharge. No scleral icterus.   Neck: Normal range of motion. Neck supple. No JVD present. No tracheal deviation present. No thyromegaly present.   Cardiovascular: Normal rate, regular rhythm and normal heart sounds.   Pulmonary/Chest: Effort normal and breath sounds normal. No stridor. No respiratory distress. She has no wheezes.   Abdominal: Soft. She exhibits no distension and no mass. There is no tenderness. There is no rebound and no guarding. No hernia.   Musculoskeletal: She exhibits tenderness. She exhibits no edema or deformity.        Lumbar back: She exhibits decreased range of motion, tenderness, bony tenderness, pain and spasm.   Motor strength 4/5 in upper and lower right extremity     Gait instability    Pt is high fall risk  Get up and go test >10 seconds     Lymphadenopathy:     She has no cervical adenopathy.   Neurological: She is alert and oriented to person, place, and time. She has normal reflexes. No cranial nerve deficit. Coordination normal.   Skin: Skin is warm and dry. No rash noted. She is not diaphoretic. No erythema. No pallor.   Psychiatric: She has a normal mood and affect. Her behavior is normal.   Nursing note and vitals reviewed.      Assessment/Plan   Problems Addressed this Visit        Cardiovascular and Mediastinum    Cerebrovascular accident (CVA) (CMS/HCC) - Primary    Essential hypertension       Digestive    Constipation    Obesity       Nervous and Auditory    History of brain tumor       Hematopoietic and Hemostatic    Iron deficiency anemia       Other    Tobacco user    Tobacco abuse counseling    Gait instability    History of CVA (cerebrovascular accident)    Depression      -for hypertension - BP  at goal today. Continue on norvasc 5 mg PO q daily .She is to continue lisinopril-HCTZ 20-25 mg PO daily.  -pt advised to go to ER or call 911 if symptoms get  worrisome or severe  - will reschedule mammogram screening at [facility]  -for pap smear - referral to DAREK Parra Consultation appreciated  -will get labwork including cbc, cmp, lipid panel, vitamin D and iron studies  -anemia - recheck cbc and iron studies  -colonoscopy screening at [facility] - consultation appreciated  -GERD - continue  on protonix 40 mg daily. Gave food choices for GERD to go home with . Gave drug information to go home with. Pt may need EGD in near future  - tobacco user- counseled pt to stop smoking. Counseled >5 minutes she did not like chantix.  Continue nicotine gum  Drug information given  -refilled medications today   - pt advised to call for reappt with DAREK Parra for pap smear  - for history of CVA /gait instability/slurring of speech- pt is seeing Neurologist in Neurologist next month. Clinically stable   - for hyperlipidemia - lipitor 80 mg PO qhs recheck lipid panel   - depression - stable. Pt is off celexa now  - for constipation/IBS  - better with linzess but will go up from 72 to 144 mcg every other day. Samples provided today.   -obesity -  Pt lost2  lbs since last visit   - for chronic back patient adivsed to call appt with Pain Management soon   -recheck in 6 weeks  To see how pt does on new dosage of linzess and chantix  -get labwork on next visit   - gave pt prescription for walker with chair   -advised pt to be safe and call with any questions or concerns        This document has been electronically signed by Brown Lion MD on November 20, 2018 3:25 PM                 Review of Systems   Constitutional: Positive for fatigue. Negative for chills, diaphoresis, fever, irritability, unexpected weight gain and unexpected weight loss.   HENT: Positive for voice change. Negative for congestion and sore throat.    Eyes: Negative.    Respiratory: Negative.  Negative for cough, choking and shortness of breath.    Cardiovascular: Negative.  Negative for chest pain and  palpitations.   Gastrointestinal: Positive for constipation. Negative for abdominal pain, anorexia, change in bowel habit, diarrhea, nausea, rectal pain and vomiting.   Endocrine: Negative.    Genitourinary: Negative.  Negative for dysuria, impotence and pelvic pain.   Musculoskeletal: Positive for back pain, extremity weakness and gait problem. Negative for arthralgias, gout, joint swelling, myalgias, neck pain and stiffness.   Skin: Negative.  Negative for itching and rash.   Allergic/Immunologic: Negative.    Neurological: Negative for vertigo, tingling, weakness, numbness and confusion.   Hematological: Negative.    Psychiatric/Behavioral: Positive for decreased concentration and depressed mood. Negative for substance abuse and suicidal ideas. The patient is not nervous/anxious and does not have insomnia.        Physical Exam   Constitutional: She is oriented to person, place, and time. She appears well-developed and well-nourished. No distress.   Pt has speech issues    HENT:   Head: Normocephalic and atraumatic.   Right Ear: External ear normal.   Left Ear: External ear normal.   Eyes: Conjunctivae and EOM are normal. Pupils are equal, round, and reactive to light. Right eye exhibits no discharge. Left eye exhibits no discharge. No scleral icterus.   Neck: Normal range of motion. Neck supple. No JVD present. No tracheal deviation present. No thyromegaly present.   Cardiovascular: Normal rate, regular rhythm and normal heart sounds.   Pulmonary/Chest: Effort normal and breath sounds normal. No stridor. No respiratory distress. She has no wheezes.   Abdominal: Soft. She exhibits no distension and no mass. There is no tenderness. There is no rebound and no guarding. No hernia.   Musculoskeletal: She exhibits tenderness. She exhibits no edema or deformity.        Lumbar back: She exhibits decreased range of motion, tenderness, bony tenderness, pain and spasm.   Motor strength 4/5 in upper and lower right extremity      Gait instability    Pt is high fall risk  Get up and go test >10 seconds     Lymphadenopathy:     She has no cervical adenopathy.   Neurological: She is alert and oriented to person, place, and time. She has normal reflexes. No cranial nerve deficit. Coordination normal.   Skin: Skin is warm and dry. No rash noted. She is not diaphoretic. No erythema. No pallor.   Psychiatric: She has a normal mood and affect. Her behavior is normal.   Nursing note and vitals reviewed.

## 2018-11-20 ENCOUNTER — OFFICE VISIT (OUTPATIENT)
Dept: FAMILY MEDICINE CLINIC | Facility: CLINIC | Age: 63
End: 2018-11-20

## 2018-11-20 VITALS
DIASTOLIC BLOOD PRESSURE: 78 MMHG | SYSTOLIC BLOOD PRESSURE: 120 MMHG | TEMPERATURE: 98.8 F | HEART RATE: 97 BPM | BODY MASS INDEX: 34.16 KG/M2 | WEIGHT: 225.4 LBS | OXYGEN SATURATION: 98 % | HEIGHT: 68 IN

## 2018-11-20 DIAGNOSIS — Z12.4 ENCOUNTER FOR PAPANICOLAOU SMEAR OF CERVIX: ICD-10-CM

## 2018-11-20 DIAGNOSIS — Z72.0 TOBACCO USER: ICD-10-CM

## 2018-11-20 DIAGNOSIS — R26.81 GAIT INSTABILITY: ICD-10-CM

## 2018-11-20 DIAGNOSIS — Z12.11 ENCOUNTER FOR SCREENING FOR MALIGNANT NEOPLASM OF COLON: Primary | ICD-10-CM

## 2018-11-20 DIAGNOSIS — Z86.73 HISTORY OF CVA (CEREBROVASCULAR ACCIDENT): ICD-10-CM

## 2018-11-20 DIAGNOSIS — B18.2 CHRONIC HEPATITIS C WITHOUT HEPATIC COMA (HCC): ICD-10-CM

## 2018-11-20 DIAGNOSIS — Z87.898 HISTORY OF BRAIN TUMOR: ICD-10-CM

## 2018-11-20 DIAGNOSIS — I63.9 CEREBROVASCULAR ACCIDENT (CVA), UNSPECIFIED MECHANISM (HCC): ICD-10-CM

## 2018-11-20 DIAGNOSIS — D50.9 IRON DEFICIENCY ANEMIA, UNSPECIFIED IRON DEFICIENCY ANEMIA TYPE: ICD-10-CM

## 2018-11-20 DIAGNOSIS — K59.00 CONSTIPATION, UNSPECIFIED CONSTIPATION TYPE: ICD-10-CM

## 2018-11-20 DIAGNOSIS — Z00.00 GENERAL MEDICAL EXAMINATION: ICD-10-CM

## 2018-11-20 DIAGNOSIS — K58.1 IRRITABLE BOWEL SYNDROME WITH CONSTIPATION: ICD-10-CM

## 2018-11-20 DIAGNOSIS — I10 ESSENTIAL HYPERTENSION: ICD-10-CM

## 2018-11-20 DIAGNOSIS — F32.A DEPRESSION, UNSPECIFIED DEPRESSION TYPE: ICD-10-CM

## 2018-11-20 DIAGNOSIS — E78.5 HYPERLIPIDEMIA, UNSPECIFIED HYPERLIPIDEMIA TYPE: ICD-10-CM

## 2018-11-20 DIAGNOSIS — E66.9 OBESITY, UNSPECIFIED CLASSIFICATION, UNSPECIFIED OBESITY TYPE, UNSPECIFIED WHETHER SERIOUS COMORBIDITY PRESENT: ICD-10-CM

## 2018-11-20 DIAGNOSIS — Z12.31 ENCOUNTER FOR SCREENING MAMMOGRAM FOR MALIGNANT NEOPLASM OF BREAST: ICD-10-CM

## 2018-11-20 DIAGNOSIS — R76.8 HEPATITIS C ANTIBODY POSITIVE IN BLOOD: ICD-10-CM

## 2018-11-20 DIAGNOSIS — D64.9 ANEMIA, UNSPECIFIED TYPE: ICD-10-CM

## 2018-11-20 DIAGNOSIS — Z71.6 TOBACCO ABUSE COUNSELING: ICD-10-CM

## 2018-11-20 PROCEDURE — 99214 OFFICE O/P EST MOD 30 MIN: CPT | Performed by: FAMILY MEDICINE

## 2018-11-20 RX ORDER — AMLODIPINE BESYLATE 5 MG/1
5 TABLET ORAL DAILY
Qty: 30 TABLET | Refills: 6 | Status: SHIPPED | OUTPATIENT
Start: 2018-11-20 | End: 2019-08-14

## 2018-11-20 RX ORDER — CLOPIDOGREL BISULFATE 75 MG/1
75 TABLET ORAL DAILY
Qty: 30 TABLET | Refills: 6 | Status: SHIPPED | OUTPATIENT
Start: 2018-11-20 | End: 2019-08-20 | Stop reason: SDUPTHER

## 2018-11-20 RX ORDER — ASPIRIN 325 MG
325 TABLET, DELAYED RELEASE (ENTERIC COATED) ORAL DAILY
Qty: 30 TABLET | Refills: 6 | Status: SHIPPED | OUTPATIENT
Start: 2018-11-20 | End: 2019-12-05 | Stop reason: SDUPTHER

## 2018-11-20 RX ORDER — LISINOPRIL AND HYDROCHLOROTHIAZIDE 25; 20 MG/1; MG/1
2 TABLET ORAL DAILY
Qty: 30 TABLET | Refills: 6 | Status: SHIPPED | OUTPATIENT
Start: 2018-11-20 | End: 2019-05-09 | Stop reason: SDUPTHER

## 2018-11-20 RX ORDER — NICOTINE 21 MG/24HR
1 PATCH, TRANSDERMAL 24 HOURS TRANSDERMAL EVERY 24 HOURS
Qty: 30 PATCH | Refills: 6 | Status: SHIPPED | OUTPATIENT
Start: 2018-11-20 | End: 2019-08-14

## 2018-11-20 RX ORDER — PANTOPRAZOLE SODIUM 40 MG/1
40 TABLET, DELAYED RELEASE ORAL DAILY
Qty: 30 TABLET | Refills: 6 | Status: SHIPPED | OUTPATIENT
Start: 2018-11-20 | End: 2021-06-24 | Stop reason: SDUPTHER

## 2018-11-20 RX ORDER — ATORVASTATIN CALCIUM 80 MG/1
80 TABLET, FILM COATED ORAL DAILY
Qty: 30 TABLET | Refills: 6 | Status: SHIPPED | OUTPATIENT
Start: 2018-11-20 | End: 2019-08-14

## 2018-11-20 RX ORDER — LISINOPRIL AND HYDROCHLOROTHIAZIDE 25; 20 MG/1; MG/1
2 TABLET ORAL DAILY
Qty: 60 TABLET | Refills: 6 | Status: SHIPPED | OUTPATIENT
Start: 2018-11-20 | End: 2018-11-20 | Stop reason: SDUPTHER

## 2018-11-20 RX ORDER — ATORVASTATIN CALCIUM 20 MG/1
20 TABLET, FILM COATED ORAL
COMMUNITY
End: 2018-11-20 | Stop reason: DRUGHIGH

## 2018-11-20 RX ORDER — FERROUS SULFATE 325(65) MG
325 TABLET ORAL
Qty: 30 TABLET | Refills: 6 | Status: SHIPPED | OUTPATIENT
Start: 2018-11-20 | End: 2019-08-20 | Stop reason: SDUPTHER

## 2018-11-20 NOTE — PATIENT INSTRUCTIONS
Get mammogram at imaging center  -wlil refer to OB/GYN for pap smear  -will refer to gastroenterologist for colon cancer screening  -get labwork soon  -recheck     Have neurologist send me an office note to my office     Recheck in March/April 2019.  If needed can see other providers in clinic

## 2018-11-28 DIAGNOSIS — B18.2 CHRONIC HEPATITIS C WITHOUT HEPATIC COMA (HCC): Primary | ICD-10-CM

## 2019-02-20 ENCOUNTER — LAB (OUTPATIENT)
Dept: LAB | Facility: HOSPITAL | Age: 64
End: 2019-02-20

## 2019-02-20 ENCOUNTER — OFFICE VISIT (OUTPATIENT)
Dept: OBSTETRICS AND GYNECOLOGY | Facility: CLINIC | Age: 64
End: 2019-02-20

## 2019-02-20 VITALS
SYSTOLIC BLOOD PRESSURE: 136 MMHG | HEIGHT: 67 IN | DIASTOLIC BLOOD PRESSURE: 98 MMHG | WEIGHT: 232 LBS | BODY MASS INDEX: 36.41 KG/M2

## 2019-02-20 DIAGNOSIS — I10 ESSENTIAL HYPERTENSION: ICD-10-CM

## 2019-02-20 DIAGNOSIS — Z01.419 WELL FEMALE EXAM WITH ROUTINE GYNECOLOGICAL EXAM: Primary | ICD-10-CM

## 2019-02-20 DIAGNOSIS — B18.2 CHRONIC HEPATITIS C WITHOUT HEPATIC COMA (HCC): ICD-10-CM

## 2019-02-20 DIAGNOSIS — E78.5 HYPERLIPIDEMIA, UNSPECIFIED HYPERLIPIDEMIA TYPE: ICD-10-CM

## 2019-02-20 DIAGNOSIS — Z00.00 GENERAL MEDICAL EXAMINATION: ICD-10-CM

## 2019-02-20 DIAGNOSIS — D64.9 ANEMIA, UNSPECIFIED TYPE: ICD-10-CM

## 2019-02-20 DIAGNOSIS — Z12.31 ENCOUNTER FOR SCREENING MAMMOGRAM FOR BREAST CANCER: ICD-10-CM

## 2019-02-20 LAB
25(OH)D3 SERPL-MCNC: <12.8 NG/ML (ref 30–100)
ALBUMIN SERPL-MCNC: 4 G/DL (ref 3.4–4.8)
ALBUMIN/GLOB SERPL: 1.1 G/DL (ref 1.1–1.8)
ALP SERPL-CCNC: 63 U/L (ref 38–126)
ALT SERPL W P-5'-P-CCNC: 13 U/L (ref 9–52)
ANION GAP SERPL CALCULATED.3IONS-SCNC: 7 MMOL/L (ref 5–15)
ARTICHOKE IGE QN: 93 MG/DL (ref 1–129)
AST SERPL-CCNC: 16 U/L (ref 14–36)
BASOPHILS # BLD AUTO: 0.05 10*3/MM3 (ref 0–0.2)
BASOPHILS NFR BLD AUTO: 1 % (ref 0–1.5)
BILIRUB SERPL-MCNC: 0.3 MG/DL (ref 0.2–1.3)
BUN BLD-MCNC: 25 MG/DL (ref 7–21)
BUN/CREAT SERPL: 21 (ref 7–25)
CALCIUM SPEC-SCNC: 9.7 MG/DL (ref 8.4–10.2)
CHLORIDE SERPL-SCNC: 101 MMOL/L (ref 95–110)
CHOLEST SERPL-MCNC: 222 MG/DL (ref 0–199)
CO2 SERPL-SCNC: 26 MMOL/L (ref 22–31)
CREAT BLD-MCNC: 1.19 MG/DL (ref 0.5–1)
DEPRECATED RDW RBC AUTO: 50.4 FL (ref 37–54)
EOSINOPHIL # BLD AUTO: 0.21 10*3/MM3 (ref 0–0.4)
EOSINOPHIL NFR BLD AUTO: 4.3 % (ref 0.3–6.2)
ERYTHROCYTE [DISTWIDTH] IN BLOOD BY AUTOMATED COUNT: 17.2 % (ref 12.3–15.4)
FERRITIN SERPL-MCNC: 89.2 NG/ML (ref 11.1–264)
GFR SERPL CREATININE-BSD FRML MDRD: 56 ML/MIN/1.73 (ref 45–104)
GLOBULIN UR ELPH-MCNC: 3.6 GM/DL (ref 2.3–3.5)
GLUCOSE BLD-MCNC: 100 MG/DL (ref 60–100)
HCT VFR BLD AUTO: 30.4 % (ref 34–46.6)
HCV AB SER DONR QL: NEGATIVE
HDLC SERPL-MCNC: 63 MG/DL (ref 60–200)
HGB BLD-MCNC: 10.3 G/DL (ref 12–15.9)
IMM GRANULOCYTES # BLD AUTO: 0.01 10*3/MM3 (ref 0–0.05)
IMM GRANULOCYTES NFR BLD AUTO: 0.2 % (ref 0–0.5)
IRON 24H UR-MRATE: 61 MCG/DL (ref 37–170)
IRON SATN MFR SERPL: 23 % (ref 15–50)
LDLC/HDLC SERPL: 1.97 {RATIO} (ref 0–3.22)
LYMPHOCYTES # BLD AUTO: 1.78 10*3/MM3 (ref 0.7–3.1)
LYMPHOCYTES NFR BLD AUTO: 36.6 % (ref 19.6–45.3)
MCH RBC QN AUTO: 27.1 PG (ref 26.6–33)
MCHC RBC AUTO-ENTMCNC: 33.9 G/DL (ref 31.5–35.7)
MCV RBC AUTO: 80 FL (ref 79–97)
MONOCYTES # BLD AUTO: 0.51 10*3/MM3 (ref 0.1–0.9)
MONOCYTES NFR BLD AUTO: 10.5 % (ref 5–12)
NEUTROPHILS # BLD AUTO: 2.3 10*3/MM3 (ref 1.4–7)
NEUTROPHILS NFR BLD AUTO: 47.4 % (ref 42.7–76)
NRBC BLD AUTO-RTO: 0 /100 WBC (ref 0–0)
PLATELET # BLD AUTO: 218 10*3/MM3 (ref 140–450)
PMV BLD AUTO: 11.6 FL (ref 6–12)
POTASSIUM BLD-SCNC: 3.9 MMOL/L (ref 3.5–5.1)
PROT SERPL-MCNC: 7.6 G/DL (ref 6.3–8.6)
RBC # BLD AUTO: 3.8 10*6/MM3 (ref 3.77–5.28)
SODIUM BLD-SCNC: 134 MMOL/L (ref 137–145)
TIBC SERPL-MCNC: 268 MCG/DL (ref 265–497)
TRIGL SERPL-MCNC: 173 MG/DL (ref 20–199)
WBC NRBC COR # BLD: 4.86 10*3/MM3 (ref 3.4–10.8)

## 2019-02-20 PROCEDURE — 87624 HPV HI-RISK TYP POOLED RSLT: CPT | Performed by: NURSE PRACTITIONER

## 2019-02-20 PROCEDURE — 87522 HEPATITIS C REVRS TRNSCRPJ: CPT

## 2019-02-20 PROCEDURE — 88141 CYTOPATH C/V INTERPRET: CPT | Performed by: PATHOLOGY

## 2019-02-20 PROCEDURE — 99386 PREV VISIT NEW AGE 40-64: CPT | Performed by: NURSE PRACTITIONER

## 2019-02-20 PROCEDURE — 85025 COMPLETE CBC W/AUTO DIFF WBC: CPT

## 2019-02-20 PROCEDURE — 82306 VITAMIN D 25 HYDROXY: CPT

## 2019-02-20 PROCEDURE — 87902 NFCT AGT GNTYP ALYS HEP C: CPT

## 2019-02-20 PROCEDURE — 83540 ASSAY OF IRON: CPT

## 2019-02-20 PROCEDURE — G0123 SCREEN CERV/VAG THIN LAYER: HCPCS | Performed by: NURSE PRACTITIONER

## 2019-02-20 PROCEDURE — 80053 COMPREHEN METABOLIC PANEL: CPT

## 2019-02-20 PROCEDURE — 82728 ASSAY OF FERRITIN: CPT

## 2019-02-20 PROCEDURE — 86803 HEPATITIS C AB TEST: CPT

## 2019-02-20 PROCEDURE — 80061 LIPID PANEL: CPT

## 2019-02-20 PROCEDURE — 83550 IRON BINDING TEST: CPT

## 2019-02-20 NOTE — PROGRESS NOTES
Raphael Licea is a 63 y.o. Here for GYN exam. NO complaints or concerns at this time.    LMP ~ ; Denies any hx of PMB  Last pap ~   Last mammo- unknown    Pt has had a brain tumor and sx on her brain that has left her with an impaired memory.       Gynecologic Exam   The patient's pertinent negatives include no genital itching, genital lesions, genital odor, genital rash, pelvic pain, vaginal bleeding or vaginal discharge. Pertinent negatives include no abdominal pain, constipation, diarrhea or dysuria. She is sexually active. No, her partner does not have an STD. She is postmenopausal. Her past medical history is significant for a  section. There is no history of an abdominal surgery, an ectopic pregnancy, endometriosis, herpes simplex, menorrhagia, metrorrhagia, miscarriage, ovarian cysts, perineal abscess, PID, an STD, a terminated pregnancy or vaginosis.       The following portions of the patient's history were reviewed and updated as appropriate: allergies, current medications, past family history, past medical history, past social history, past surgical history and problem list.    Review of Systems   Constitutional: Negative for activity change, appetite change, diaphoresis, fatigue, unexpected weight gain and unexpected weight loss.   Respiratory: Negative for chest tightness and shortness of breath.    Cardiovascular: Negative for chest pain and palpitations.   Gastrointestinal: Negative for abdominal distention, abdominal pain, constipation and diarrhea.   Genitourinary: Negative for breast discharge, urinary incontinence, decreased libido, dyspareunia, dysuria, menorrhagia, pelvic pain, vaginal bleeding, vaginal discharge, vaginal pain, breast lump and breast pain.   Musculoskeletal: Negative for myalgias.   Skin: Negative for color change, dry skin and skin lesions.   Neurological: Positive for memory problem. Negative for light-headedness and headache.    Psychiatric/Behavioral: Negative for agitation, dysphoric mood, sleep disturbance, depressed mood and stress. The patient is not nervous/anxious.        Objective   Physical Exam   Constitutional: She is oriented to person, place, and time. She appears well-developed and well-nourished.   Neck: No thyromegaly present.   Cardiovascular: Normal rate, regular rhythm, normal heart sounds and intact distal pulses.   Pulmonary/Chest: Effort normal and breath sounds normal. Right breast exhibits no inverted nipple, no mass, no nipple discharge, no skin change and no tenderness. Left breast exhibits no inverted nipple, no mass, no nipple discharge, no skin change and no tenderness. Breasts are symmetrical.   Abdominal: Soft. Bowel sounds are normal. She exhibits no distension. There is no tenderness.   Genitourinary: Vagina normal and uterus normal. No breast discharge or bleeding. There is no rash, tenderness, lesion or injury on the right labia. There is no rash, tenderness, lesion or injury on the left labia. Cervix exhibits no motion tenderness, no discharge and no friability. Right adnexum displays no mass, no tenderness and no fullness. Left adnexum displays no mass, no tenderness and no fullness.   Genitourinary Comments: Pap smear obtained   Lymphadenopathy:     She has no axillary adenopathy.        Right: No inguinal adenopathy present.        Left: No inguinal adenopathy present.   Neurological: She is alert and oriented to person, place, and time.   Skin: Skin is warm, dry and intact.   Psychiatric: She has a normal mood and affect. Her speech is normal and behavior is normal.   Nursing note and vitals reviewed.        Assessment/Plan   Nathaly was seen today for gynecologic exam.    Diagnoses and all orders for this visit:    Well female exam with routine gynecological exam  -     Liquid-based Pap Smear, Screening    Encounter for screening mammogram for breast cancer  -     Mammo Screening Digital  Tomosynthesis Bilateral With CAD; Future        Mammogram scheduled at [facility] for Monday 3/25/19 @ 9:45am

## 2019-02-22 LAB
HCV RNA SERPL NAA+PROBE-ACNC: NORMAL IU/ML
TEST INFORMATION: NORMAL

## 2019-02-24 LAB
HCV GENTYP SERPL NAA+PROBE: NORMAL
Lab: NORMAL

## 2019-02-25 LAB
GEN CATEG CVX/VAG CYTO-IMP: ABNORMAL
LAB AP CASE REPORT: ABNORMAL
LAB AP GYN ADDITIONAL INFORMATION: ABNORMAL
PATH INTERP SPEC-IMP: ABNORMAL
STAT OF ADQ CVX/VAG CYTO-IMP: ABNORMAL

## 2019-02-28 LAB — HPV I/H RISK 4 DNA CVX QL PROBE+SIG AMP: NEGATIVE

## 2019-03-05 RX ORDER — ERGOCALCIFEROL 1.25 MG/1
50000 CAPSULE ORAL WEEKLY
Qty: 4 CAPSULE | Refills: 3 | Status: SHIPPED | OUTPATIENT
Start: 2019-03-05 | End: 2019-08-20 | Stop reason: SDUPTHER

## 2019-03-20 PROBLEM — R73.03 PREDIABETES: Status: ACTIVE | Noted: 2019-03-20

## 2019-03-20 PROBLEM — E55.9 VITAMIN D DEFICIENCY: Status: ACTIVE | Noted: 2019-03-20

## 2019-04-09 PROBLEM — N18.30 STAGE 3 CHRONIC KIDNEY DISEASE (HCC): Status: ACTIVE | Noted: 2019-04-09

## 2019-04-09 PROBLEM — Z12.11 ENCOUNTER FOR SCREENING COLONOSCOPY: Status: ACTIVE | Noted: 2019-04-09

## 2019-04-09 PROBLEM — Z12.31 SCREENING MAMMOGRAM, ENCOUNTER FOR: Status: ACTIVE | Noted: 2019-04-09

## 2019-05-09 RX ORDER — LISINOPRIL AND HYDROCHLOROTHIAZIDE 25; 20 MG/1; MG/1
2 TABLET ORAL DAILY
Qty: 30 TABLET | Refills: 0 | Status: SHIPPED | OUTPATIENT
Start: 2019-05-09 | End: 2019-05-30 | Stop reason: SDUPTHER

## 2019-05-30 ENCOUNTER — TELEPHONE (OUTPATIENT)
Dept: FAMILY MEDICINE CLINIC | Facility: CLINIC | Age: 64
End: 2019-05-30

## 2019-05-30 RX ORDER — LISINOPRIL AND HYDROCHLOROTHIAZIDE 25; 20 MG/1; MG/1
2 TABLET ORAL DAILY
Qty: 30 TABLET | Refills: 0 | Status: SHIPPED | OUTPATIENT
Start: 2019-05-30 | End: 2019-06-14 | Stop reason: SDUPTHER

## 2019-05-30 NOTE — TELEPHONE ENCOUNTER
Ms. Licea is needing refills sent to [facility]. She has an appointment set for June 4th. Phone [phone]

## 2019-06-14 ENCOUNTER — TELEPHONE (OUTPATIENT)
Dept: FAMILY MEDICINE CLINIC | Facility: CLINIC | Age: 64
End: 2019-06-14

## 2019-06-14 RX ORDER — LISINOPRIL AND HYDROCHLOROTHIAZIDE 25; 20 MG/1; MG/1
2 TABLET ORAL DAILY
Qty: 30 TABLET | Refills: 0 | Status: SHIPPED | OUTPATIENT
Start: 2019-06-14 | End: 2019-07-02 | Stop reason: SDUPTHER

## 2019-07-02 ENCOUNTER — TELEPHONE (OUTPATIENT)
Dept: FAMILY MEDICINE CLINIC | Facility: CLINIC | Age: 64
End: 2019-07-02

## 2019-07-02 RX ORDER — LISINOPRIL AND HYDROCHLOROTHIAZIDE 25; 20 MG/1; MG/1
2 TABLET ORAL DAILY
Qty: 30 TABLET | Refills: 0 | Status: SHIPPED | OUTPATIENT
Start: 2019-07-02 | End: 2019-07-19 | Stop reason: SDUPTHER

## 2019-07-19 ENCOUNTER — TELEPHONE (OUTPATIENT)
Dept: FAMILY MEDICINE CLINIC | Facility: CLINIC | Age: 64
End: 2019-07-19

## 2019-07-19 RX ORDER — LISINOPRIL AND HYDROCHLOROTHIAZIDE 25; 20 MG/1; MG/1
2 TABLET ORAL DAILY
Qty: 30 TABLET | Refills: 0 | Status: SHIPPED | OUTPATIENT
Start: 2019-07-19 | End: 2019-08-05 | Stop reason: SDUPTHER

## 2019-08-05 ENCOUNTER — TELEPHONE (OUTPATIENT)
Dept: FAMILY MEDICINE CLINIC | Facility: CLINIC | Age: 64
End: 2019-08-05

## 2019-08-06 RX ORDER — LISINOPRIL AND HYDROCHLOROTHIAZIDE 25; 20 MG/1; MG/1
2 TABLET ORAL DAILY
Qty: 30 TABLET | Refills: 0 | Status: SHIPPED | OUTPATIENT
Start: 2019-08-06 | End: 2019-08-20 | Stop reason: SDUPTHER

## 2019-08-12 NOTE — PROGRESS NOTES
norv   Subjective:  Nathaly Licea is a 63 y.o. female who presents for       Patient Active Problem List   Diagnosis   • Encounter for screening for diabetes mellitus   • Encounter for screening mammogram for malignant neoplasm of breast   • Encounter for screening for malignant neoplasm of colon   • Encounter for gynecological examination   • History of brain tumor   • Encounter for screening for endocrine disorder   • Encounter for screening for cardiovascular disorders   • Tobacco user   • Need for hepatitis C screening test   • Tobacco abuse counseling   • Cerebrovascular accident (CVA) (CMS/HCC)   • Need for immunization against influenza   • Chronic low back pain with bilateral sciatica   • Chronic back pain   • Right hemiparesis (CMS/HCC)   • Slurring of speech   • Hyperlipidemia   • Insomnia   • Constipation   • Iron deficiency anemia   • Hepatitis C antibody positive in blood   • Gait instability   • History of CVA (cerebrovascular accident)   • Essential hypertension   • Depression   • Deep vein thrombosis (DVT) of left upper extremity (CMS/HCC)   • Need for hepatitis vaccination   • History of cardiac arrest   • Gastroesophageal reflux disease   • Obesity   • Chronic hepatitis C without hepatic coma (CMS/HCC)   • Encounter for Papanicolaou smear of cervix   • Anemia   • General medical examination   • Irritable bowel syndrome with constipation   • Prediabetes   • Vitamin D deficiency   • Encounter for screening colonoscopy   • Screening mammogram, encounter for   • Stage 3 chronic kidney disease (CMS/HCC)           Current Outpatient Medications:   •  amLODIPine (NORVASC) 5 MG tablet, Take 1 tablet by mouth Daily., Disp: 30 tablet, Rfl: 6  •  aspirin  MG tablet, Take 1 tablet by mouth Daily., Disp: 30 tablet, Rfl: 6  •  atorvastatin (LIPITOR) 80 MG tablet, Take 1 tablet by mouth Daily., Disp: 30 tablet, Rfl: 6  •  clopidogrel (PLAVIX) 75 MG tablet, Take 1 tablet by mouth Daily., Disp: 30 tablet,  Rfl: 6  •  ferrous sulfate 325 (65 FE) MG tablet, Take 1 tablet by mouth Daily With Breakfast., Disp: 30 tablet, Rfl: 6  •  linaclotide (LINZESS) 145 MCG capsule capsule, Take 1 capsule by mouth Every Other Day., Disp: 30 capsule, Rfl: 6  •  lisinopril-hydrochlorothiazide (PRINZIDE,ZESTORETIC) 20-25 MG per tablet, Take 2 tablets by mouth Daily., Disp: 30 tablet, Rfl: 0  •  nicotine (NICODERM CQ) 21 MG/24HR patch, Place 1 patch on the skin as directed by provider Daily., Disp: 30 patch, Rfl: 6  •  nicotine polacrilex (NICORETTE) 4 MG gum, Chew 1 each As Needed for Smoking Cessation., Disp: 60 each, Rfl: 6  •  pantoprazole (PROTONIX) 40 MG EC tablet, Take 1 tablet by mouth Daily., Disp: 30 tablet, Rfl: 6  •  vitamin D (ERGOCALCIFEROL) 25676 units capsule capsule, Take 1 capsule by mouth 1 (One) Time Per Week., Disp: 4 capsule, Rfl: 3        11/20/18 pt Iis here for recheck and followup. She has history of hypertension and CVA . She is due for new labwork.  She also sees a Neurologist. She was advised to quit smoking. She continues to smoke 1-2 cigarettes a day. She states she was not feeling well recently because she ran out of her medication. She has been having transporation issues as well. She has been having throbbing headaches that have improved. Her headaches have improved.  She has not had headache since her surgery earlier this year for brain tumor removal.  She also sees a Neurologist in [place].. She is also due for mammogram. Pap smear and colonoscopy appt    8/14/19 pt is here for recheck and followup. She is due for new labwork. She continues to smoke  2 cigarettes a day. Her BP is elevated today.  No chest pain no dizziness. She did have CVA. She is still seeing Neurologist with [place].   She also went to ER at [facility] for back pain and pinched nerve. On right side. She does not want to do PT/OT. She also has lower back that has been present for years. She did see a Pain Specialist years ago.  She has  tried PT/OT in the past.     Back Pain   This is a chronic problem. The current episode started more than 1 year ago. The problem occurs constantly. The problem is unchanged. The pain is present in the lumbar spine. The quality of the pain is described as aching. The pain does not radiate. The pain is at a severity of 5/10. The pain is moderate. The symptoms are aggravated by bending, lying down and position. Associated symptoms include headaches, numbness, paresis and weakness. Pertinent negatives include no abdominal pain, bladder incontinence, bowel incontinence, chest pain, dysuria, fever, leg pain, paresthesias, pelvic pain, perianal numbness, tingling or weight loss. She has tried nothing for the symptoms. The treatment provided no relief.   Cerebrovascular Accident   This is a new problem. The current episode started more than 1 month ago. The problem occurs daily. The problem has been waxing and waning. Associated symptoms include fatigue. Pertinent negatives include no abdominal pain, anorexia, arthralgias, change in bowel habit, chest pain, chills, congestion, coughing, diaphoresis, fever, headaches, joint swelling, myalgias, nausea, neck pain, numbness, rash, sore throat, swollen glands, urinary symptoms, vertigo, visual change, vomiting or weakness. Nothing aggravates the symptoms. She has tried nothing for the symptoms. The treatment provided no relief.   Extremity Weakness    The pain is present in the right wrist, right hand, right fingers, right arm, right shoulder, right elbow, right upper leg, right knee, right lower leg, right ankle, right foot and right toes. This is a new problem. The current episode started more than 1 month ago. There has been no history of extremity trauma. The problem occurs daily. The problem has been waxing and waning. The pain is at a severity of 3/10. The pain is mild. Pertinent negatives include no fever, inability to bear weight, itching, joint locking, joint swelling,  limited range of motion, numbness, stiffness or tingling. She has tried nothing for the symptoms. The treatment provided no relief. Family history does not include gout or rheumatoid arthritis. There is no history of diabetes, gout, osteoarthritis or rheumatoid arthritis.   Depression   Visit Type: initial  Onset of symptoms: at an unknown time  Progression since onset: gradually worsening  Patient presents with the following symptoms: anhedonia, decreased concentration, depressed mood and fatigue.  Patient is not experiencing: chest pain, choking sensation, compulsions, confusion, dizziness, dry mouth, excessive worry, feelings of hopelessness, feelings of worthlessness, hypersomnia, hyperventilation, impotence, insomnia, irritability, malaise, memory impairment, muscle tension, nausea, nervousness/anxiety, obsessions, palpitations, panic, psychomotor agitation, psychomotor retardation, restlessness, shortness of breath, suicidal ideas, suicidal planning, thoughts of death, weight gain and weight loss.  Frequency of symptoms: constantly   Severity: moderate   Risk factors: stroke.  Patient has a history of: depression  No history of: anemia, anxiety/panic attacks, arrhythmia, asthma, bipolar disorder, CAD, CHF, chronic lung disease, fibromyalgia, hyperthyroidism, suicide attempt, mental illness and substance abuse  Treatment tried: nothing  Compliance with treatment: variable       Review of Systems  Review of Systems   Constitutional: Positive for activity change and fatigue. Negative for appetite change, chills, diaphoresis, fever and weight loss.   HENT: Negative for congestion, postnasal drip, rhinorrhea, sinus pressure, sinus pain, sneezing, sore throat, trouble swallowing and voice change.    Respiratory: Negative for cough, choking, chest tightness, shortness of breath, wheezing and stridor.    Cardiovascular: Negative for chest pain.   Gastrointestinal: Negative for abdominal pain, bowel incontinence,  diarrhea, nausea and vomiting.   Genitourinary: Negative for bladder incontinence, dysuria and pelvic pain.   Musculoskeletal: Positive for arthralgias, back pain, gait problem, joint swelling and myalgias.   Neurological: Positive for speech difficulty, weakness, numbness and headaches. Negative for tingling and paresthesias.   Psychiatric/Behavioral: Positive for decreased concentration.       Patient Active Problem List   Diagnosis   • Encounter for screening for diabetes mellitus   • Encounter for screening mammogram for malignant neoplasm of breast   • Encounter for screening for malignant neoplasm of colon   • Encounter for gynecological examination   • History of brain tumor   • Encounter for screening for endocrine disorder   • Encounter for screening for cardiovascular disorders   • Tobacco user   • Need for hepatitis C screening test   • Tobacco abuse counseling   • Cerebrovascular accident (CVA) (CMS/HCC)   • Need for immunization against influenza   • Chronic low back pain with bilateral sciatica   • Chronic back pain   • Right hemiparesis (CMS/HCC)   • Slurring of speech   • Hyperlipidemia   • Insomnia   • Constipation   • Iron deficiency anemia   • Hepatitis C antibody positive in blood   • Gait instability   • History of CVA (cerebrovascular accident)   • Essential hypertension   • Depression   • Deep vein thrombosis (DVT) of left upper extremity (CMS/HCC)   • Need for hepatitis vaccination   • History of cardiac arrest   • Gastroesophageal reflux disease   • Obesity   • Chronic hepatitis C without hepatic coma (CMS/HCC)   • Encounter for Papanicolaou smear of cervix   • Anemia   • General medical examination   • Irritable bowel syndrome with constipation   • Prediabetes   • Vitamin D deficiency   • Encounter for screening colonoscopy   • Screening mammogram, encounter for   • Stage 3 chronic kidney disease (CMS/HCC)     Past Surgical History:   Procedure Laterality Date   • BRAIN SURGERY       Social  History     Socioeconomic History   • Marital status: Legally      Spouse name: Not on file   • Number of children: Not on file   • Years of education: Not on file   • Highest education level: Not on file   Tobacco Use   • Smoking status: Current Every Day Smoker     Types: Cigarettes   • Smokeless tobacco: Never Used   • Tobacco comment: 2 cigarettes a day   Substance and Sexual Activity   • Alcohol use: Yes     Comment: once a month, wine or beer   • Drug use: No   • Sexual activity: Yes     Partners: Male     Birth control/protection: None     Family History   Problem Relation Age of Onset   • Alcohol abuse Other    • Diabetes Other    • Heart disease Other    • Hypertension Other    • COPD Other    • Alcohol abuse Mother    • Arthritis Mother    • COPD Mother    • Heart disease Mother    • Hypertension Mother    • Stroke Mother    • Diabetes Father    • Heart disease Father    • Diabetes Paternal Grandmother      Office Visit on 02/20/2019   Component Date Value Ref Range Status   • Case Report 02/20/2019    Final                    Value:Gynecologic Cytology Report                       Case: [id number]                                  Authorizing Provider:  Antonina Parra APRN       Collected:           02/20/2019 10:39 AM          Ordering Location:     [facility]     Received:            02/20/2019 03:53 PM                                 [facility]                                                                 First Screen:          Jamila Kennedy                                                              Pathologist:           Dariel Linn MD                                                           Specimen:    Liquid-Based Pap, Screening, Cervix, Endocervix                                           • Interpretation 02/20/2019 Atypical squamous cells of undetermined significance*   Final   • General Categorization 02/20/2019 Epithelial cell abnormality, see interpretation    Final   • Specimen Adequacy 02/20/2019 Satisfactory for evaluation   Final   • Additional Information 02/20/2019    Final                    Value:This result contains rich text formatting which cannot be displayed here.   • HPV Aptima 02/20/2019 Negative  Negative Final    This test detects fourteen high-risk HPV types (16/18/31/33/35/39/45/  51/52/56/58/59/66/68) without differentiation.   Lab on 02/20/2019   Component Date Value Ref Range Status   • WBC 02/20/2019 4.86  3.40 - 10.80 10*3/mm3 Final   • RBC 02/20/2019 3.80  3.77 - 5.28 10*6/mm3 Final   • Hemoglobin 02/20/2019 10.3* 12.0 - 15.9 g/dL Final   • Hematocrit 02/20/2019 30.4* 34.0 - 46.6 % Final   • MCV 02/20/2019 80.0  79.0 - 97.0 fL Final   • MCH 02/20/2019 27.1  26.6 - 33.0 pg Final   • MCHC 02/20/2019 33.9  31.5 - 35.7 g/dL Final   • RDW 02/20/2019 17.2* 12.3 - 15.4 % Final   • RDW-SD 02/20/2019 50.4  37.0 - 54.0 fl Final   • MPV 02/20/2019 11.6  6.0 - 12.0 fL Final   • Platelets 02/20/2019 218  140 - 450 10*3/mm3 Final   • Neutrophil % 02/20/2019 47.4  42.7 - 76.0 % Final   • Lymphocyte % 02/20/2019 36.6  19.6 - 45.3 % Final   • Monocyte % 02/20/2019 10.5  5.0 - 12.0 % Final   • Eosinophil % 02/20/2019 4.3  0.3 - 6.2 % Final   • Basophil % 02/20/2019 1.0  0.0 - 1.5 % Final   • Immature Grans % 02/20/2019 0.2  0.0 - 0.5 % Final   • Neutrophils, Absolute 02/20/2019 2.30  1.40 - 7.00 10*3/mm3 Final   • Lymphocytes, Absolute 02/20/2019 1.78  0.70 - 3.10 10*3/mm3 Final   • Monocytes, Absolute 02/20/2019 0.51  0.10 - 0.90 10*3/mm3 Final   • Eosinophils, Absolute 02/20/2019 0.21  0.00 - 0.40 10*3/mm3 Final   • Basophils, Absolute 02/20/2019 0.05  0.00 - 0.20 10*3/mm3 Final   • Immature Grans, Absolute 02/20/2019 0.01  0.00 - 0.05 10*3/mm3 Final   • nRBC 02/20/2019 0.0  0.0 - 0.0 /100 WBC Final   • Glucose 02/20/2019 100  60 - 100 mg/dL Final   • BUN 02/20/2019 25* 7 - 21 mg/dL Final   • Creatinine 02/20/2019 1.19* 0.50 - 1.00 mg/dL Final   • Sodium  02/20/2019 134* 137 - 145 mmol/L Final   • Potassium 02/20/2019 3.9  3.5 - 5.1 mmol/L Final   • Chloride 02/20/2019 101  95 - 110 mmol/L Final   • CO2 02/20/2019 26.0  22.0 - 31.0 mmol/L Final   • Calcium 02/20/2019 9.7  8.4 - 10.2 mg/dL Final   • Total Protein 02/20/2019 7.6  6.3 - 8.6 g/dL Final   • Albumin 02/20/2019 4.00  3.40 - 4.80 g/dL Final   • ALT (SGPT) 02/20/2019 13  9 - 52 U/L Final   • AST (SGOT) 02/20/2019 16  14 - 36 U/L Final   • Alkaline Phosphatase 02/20/2019 63  38 - 126 U/L Final   • Total Bilirubin 02/20/2019 0.3  0.2 - 1.3 mg/dL Final   • eGFR   Amer 02/20/2019 56  45 - 104 mL/min/1.73 Final   • Globulin 02/20/2019 3.6* 2.3 - 3.5 gm/dL Final   • A/G Ratio 02/20/2019 1.1  1.1 - 1.8 g/dL Final   • BUN/Creatinine Ratio 02/20/2019 21.0  7.0 - 25.0 Final   • Anion Gap 02/20/2019 7.0  5.0 - 15.0 mmol/L Final   • Total Cholesterol 02/20/2019 222* 0 - 199 mg/dL Final   • Triglycerides 02/20/2019 173  20 - 199 mg/dL Final   • HDL Cholesterol 02/20/2019 63  60 - 200 mg/dL Final   • LDL Cholesterol  02/20/2019 93  1 - 129 mg/dL Final   • LDL/HDL Ratio 02/20/2019 1.97  0.00 - 3.22 Final   • 25 Hydroxy, Vitamin D 02/20/2019 <12.8* 30.0 - 100.0 ng/ml Final   • Iron 02/20/2019 61  37 - 170 mcg/dL Final   • TIBC 02/20/2019 268  265 - 497 mcg/dL Final   • Iron Saturation 02/20/2019 23  15 - 50 % Final   • Ferritin 02/20/2019 89.20  11.10 - 264.00 ng/mL Final   • Hepatitis C Ab 02/20/2019 Negative  Negative Final   • Hepatitis C Quantitation 02/20/2019 HCV Not Detected  IU/mL Final   • Test Information 02/20/2019 Comment   Final    The quantitative range of this assay is 15 IU/mL to 100 million IU/mL.   • Please note 02/20/2019 Comment   Final    This test was developed and its performance characteristics determined  by [facility].  It has not been cleared or approved by the U.S. Food and  Drug Administration.  The FDA has determined that such clearance or approval is not  necessary. This test is used for  "clinical purposes.  It should not be  regarded as investigational or for research.   • Hepatitis C Genotype 02/20/2019 Comment   Final    Specimen has insufficient hepatitis C virus RNA to obtain genotyping  results. This genotyping assay should only be used for known HCV  positive patients with HCV RNA levels above 1000 IU/mL.      No image results found.    [unfilled]  Immunization History   Administered Date(s) Administered   • Flu Vaccine Quad PF >36MO 09/27/2017   • Hepatitis A 12/15/2017   • Hepatitis B Vaccine Adult IM 12/15/2017, 01/26/2018       The following portions of the patient's history were reviewed and updated as appropriate: allergies, current medications, past family history, past medical history, past social history, past surgical history and problem list.        Physical Exam  /84   Pulse 82   Temp 99.2 °F (37.3 °C)   Ht 170.2 cm (67\")   Wt 103 kg (227 lb)   SpO2 98%   BMI 35.55 kg/m²     Physical Exam   Constitutional: She is oriented to person, place, and time. She appears well-developed and well-nourished.   HENT:   Head: Normocephalic and atraumatic.   Right Ear: External ear normal.   Eyes: Conjunctivae and EOM are normal. Pupils are equal, round, and reactive to light.   Neck: Normal range of motion. Neck supple.   Cardiovascular: Normal rate, regular rhythm and normal heart sounds.   No murmur heard.  Pulmonary/Chest: Effort normal and breath sounds normal. No respiratory distress.   Abdominal: Soft. Bowel sounds are normal. She exhibits no distension. There is no tenderness.   Obese abdomen    Musculoskeletal: She exhibits tenderness. She exhibits no edema or deformity.        Lumbar back: She exhibits decreased range of motion, tenderness, bony tenderness, pain and spasm.   Neurological: She is alert and oriented to person, place, and time. No cranial nerve deficit.   Skin: Skin is warm. No rash noted. She is not diaphoretic. No erythema. No pallor.   Psychiatric: She has " a normal mood and affect. Her behavior is normal.   Nursing note and vitals reviewed.      Assessment/Plan    Diagnosis Plan   1. Tobacco abuse counseling     2. Tobacco user     3. Vitamin D deficiency     4. Stage 3 chronic kidney disease (CMS/HCC)     5. History of CVA (cerebrovascular accident)     6. Hepatitis C antibody positive in blood     7. Gastroesophageal reflux disease, esophagitis presence not specified     8. Gait instability     9. Essential hypertension     10. Deep vein thrombosis (DVT) of left upper extremity, unspecified chronicity, unspecified vein (CMS/HCC)     11. Anemia, unspecified type     12. Chronic low back pain with bilateral sciatica, unspecified back pain laterality        -will get labwork   -for hypertension - BP  Not  at goal today. Continue on norvasc  But will go up from 5 to 10 mg PO q daily .She is to continue lisinopril-HCTZ 20-25 mg PO daily.  -xray of lower back pt declined PT/OT.  Will get x-ray of lumbar spine  -pt advised to go to ER or call 911 if symptoms get worrisome or severe  - will reschedule mammogram screening at imaging center  -for pap smear - referral to [facility] Par[facility] Consultation appreciated  - ir on deficiency anemia - recheck cbc and iron studies  -colonoscopy screening with Dr. Loza.   -GERD - continue  on protonix 40 mg daily. Gave food choices for GERD to go home with . Gave drug information to go home with. Pt may need EGD in near future  - tobacco user- counseled pt to stop smoking. Counseled >5 minutes she did not like chantix.  Continue nicotine gum  Drug information given  -refilled medications today   - pt advised to call for reappt with DAREK Parra for pap smear  - for history of CVA /gait instability/slurring of speech- pt is seeing Neurologist in Neurologist next month. Clinically stable   - for hyperlipidemia - lipitor 80 mg PO qhs recheck lipid panel   - depression - stable. Pt is off celexa now  - for constipation/IBS  - better with  linzess but will go up from 72 to 144 mcg every other day. Samples provided today.   -obesity -  counseled weight loss >5 miinutes   - for chronic back patient adivsed to call appt with Pain Management soon   -recheck in 4  weeks  To see how pt does on new dosage of linzess and chantix  -get labwork on next visit   - gave pt prescription for walker with chair   -advised pt to be safe and call with any questions or concerns        This document has been electronically signed by Brown Lion MD on August 14, 2019 7:32 AM

## 2019-08-14 ENCOUNTER — LAB (OUTPATIENT)
Dept: LAB | Facility: HOSPITAL | Age: 64
End: 2019-08-14

## 2019-08-14 ENCOUNTER — OFFICE VISIT (OUTPATIENT)
Dept: FAMILY MEDICINE CLINIC | Facility: CLINIC | Age: 64
End: 2019-08-14

## 2019-08-14 VITALS
WEIGHT: 227 LBS | HEART RATE: 82 BPM | DIASTOLIC BLOOD PRESSURE: 84 MMHG | TEMPERATURE: 99.2 F | HEIGHT: 67 IN | OXYGEN SATURATION: 98 % | BODY MASS INDEX: 35.63 KG/M2 | SYSTOLIC BLOOD PRESSURE: 146 MMHG

## 2019-08-14 DIAGNOSIS — Z86.73 HISTORY OF CVA (CEREBROVASCULAR ACCIDENT): ICD-10-CM

## 2019-08-14 DIAGNOSIS — R76.8 HEPATITIS C ANTIBODY POSITIVE IN BLOOD: ICD-10-CM

## 2019-08-14 DIAGNOSIS — R26.81 GAIT INSTABILITY: ICD-10-CM

## 2019-08-14 DIAGNOSIS — Z72.0 TOBACCO USER: ICD-10-CM

## 2019-08-14 DIAGNOSIS — I10 ESSENTIAL HYPERTENSION: ICD-10-CM

## 2019-08-14 DIAGNOSIS — M54.41 CHRONIC LOW BACK PAIN WITH BILATERAL SCIATICA, UNSPECIFIED BACK PAIN LATERALITY: ICD-10-CM

## 2019-08-14 DIAGNOSIS — G89.29 CHRONIC LOW BACK PAIN WITH BILATERAL SCIATICA, UNSPECIFIED BACK PAIN LATERALITY: ICD-10-CM

## 2019-08-14 DIAGNOSIS — I82.622 DEEP VEIN THROMBOSIS (DVT) OF LEFT UPPER EXTREMITY, UNSPECIFIED CHRONICITY, UNSPECIFIED VEIN (HCC): ICD-10-CM

## 2019-08-14 DIAGNOSIS — M54.42 CHRONIC LOW BACK PAIN WITH BILATERAL SCIATICA, UNSPECIFIED BACK PAIN LATERALITY: ICD-10-CM

## 2019-08-14 DIAGNOSIS — Z12.31 VISIT FOR SCREENING MAMMOGRAM: ICD-10-CM

## 2019-08-14 DIAGNOSIS — E55.9 VITAMIN D DEFICIENCY: ICD-10-CM

## 2019-08-14 DIAGNOSIS — D64.9 ANEMIA, UNSPECIFIED TYPE: ICD-10-CM

## 2019-08-14 DIAGNOSIS — K21.9 GASTROESOPHAGEAL REFLUX DISEASE, ESOPHAGITIS PRESENCE NOT SPECIFIED: ICD-10-CM

## 2019-08-14 DIAGNOSIS — Z12.11 ENCOUNTER FOR SCREENING COLONOSCOPY: Primary | ICD-10-CM

## 2019-08-14 DIAGNOSIS — Z71.6 TOBACCO ABUSE COUNSELING: ICD-10-CM

## 2019-08-14 DIAGNOSIS — N18.30 STAGE 3 CHRONIC KIDNEY DISEASE (HCC): ICD-10-CM

## 2019-08-14 PROCEDURE — 84439 ASSAY OF FREE THYROXINE: CPT

## 2019-08-14 PROCEDURE — 84466 ASSAY OF TRANSFERRIN: CPT

## 2019-08-14 PROCEDURE — 80053 COMPREHEN METABOLIC PANEL: CPT

## 2019-08-14 PROCEDURE — 83036 HEMOGLOBIN GLYCOSYLATED A1C: CPT

## 2019-08-14 PROCEDURE — 85025 COMPLETE CBC W/AUTO DIFF WBC: CPT

## 2019-08-14 PROCEDURE — 82728 ASSAY OF FERRITIN: CPT

## 2019-08-14 PROCEDURE — 83540 ASSAY OF IRON: CPT

## 2019-08-14 PROCEDURE — 99214 OFFICE O/P EST MOD 30 MIN: CPT | Performed by: FAMILY MEDICINE

## 2019-08-14 PROCEDURE — 82607 VITAMIN B-12: CPT

## 2019-08-14 PROCEDURE — 84443 ASSAY THYROID STIM HORMONE: CPT

## 2019-08-14 PROCEDURE — 80061 LIPID PANEL: CPT

## 2019-08-14 PROCEDURE — 82306 VITAMIN D 25 HYDROXY: CPT

## 2019-08-14 PROCEDURE — 84481 FREE ASSAY (FT-3): CPT

## 2019-08-14 RX ORDER — CYCLOBENZAPRINE HYDROCHLORIDE 7.5 MG/1
7.5 TABLET, FILM COATED ORAL 3 TIMES DAILY PRN
Qty: 90 TABLET | Refills: 2 | Status: SHIPPED | OUTPATIENT
Start: 2019-08-14 | End: 2020-01-07

## 2019-08-14 RX ORDER — AMLODIPINE BESYLATE 10 MG/1
10 TABLET ORAL DAILY
Qty: 30 TABLET | Refills: 3 | Status: SHIPPED | OUTPATIENT
Start: 2019-08-14 | End: 2020-02-20

## 2019-08-14 RX ORDER — ATORVASTATIN CALCIUM 80 MG/1
80 TABLET, FILM COATED ORAL DAILY
Qty: 30 TABLET | Refills: 6 | Status: SHIPPED | OUTPATIENT
Start: 2019-08-14 | End: 2019-12-05 | Stop reason: SDUPTHER

## 2019-08-15 ENCOUNTER — TELEPHONE (OUTPATIENT)
Dept: FAMILY MEDICINE CLINIC | Facility: CLINIC | Age: 64
End: 2019-08-15

## 2019-08-15 LAB
25(OH)D3 SERPL-MCNC: 14.6 NG/ML (ref 30–100)
ALBUMIN SERPL-MCNC: 4.4 G/DL (ref 3.5–5.2)
ALBUMIN/GLOB SERPL: 1.1 G/DL
ALP SERPL-CCNC: 53 U/L (ref 39–117)
ALT SERPL W P-5'-P-CCNC: 10 U/L (ref 1–33)
ANION GAP SERPL CALCULATED.3IONS-SCNC: 13.1 MMOL/L (ref 5–15)
AST SERPL-CCNC: 14 U/L (ref 1–32)
BASOPHILS # BLD AUTO: 0.03 10*3/MM3 (ref 0–0.2)
BASOPHILS NFR BLD AUTO: 0.6 % (ref 0–1.5)
BILIRUB SERPL-MCNC: 0.2 MG/DL (ref 0.2–1.2)
BUN BLD-MCNC: 22 MG/DL (ref 8–23)
BUN/CREAT SERPL: 21.8 (ref 7–25)
CALCIUM SPEC-SCNC: 10.1 MG/DL (ref 8.6–10.5)
CHLORIDE SERPL-SCNC: 103 MMOL/L (ref 98–107)
CHOLEST SERPL-MCNC: 230 MG/DL (ref 0–200)
CO2 SERPL-SCNC: 24.9 MMOL/L (ref 22–29)
CREAT BLD-MCNC: 1.01 MG/DL (ref 0.57–1)
DEPRECATED RDW RBC AUTO: 50.4 FL (ref 37–54)
EOSINOPHIL # BLD AUTO: 0.17 10*3/MM3 (ref 0–0.4)
EOSINOPHIL NFR BLD AUTO: 3.4 % (ref 0.3–6.2)
ERYTHROCYTE [DISTWIDTH] IN BLOOD BY AUTOMATED COUNT: 17.2 % (ref 12.3–15.4)
FERRITIN SERPL-MCNC: 227 NG/ML (ref 13–150)
GFR SERPL CREATININE-BSD FRML MDRD: 68 ML/MIN/1.73
GLOBULIN UR ELPH-MCNC: 4 GM/DL
GLUCOSE BLD-MCNC: 108 MG/DL (ref 65–99)
HBA1C MFR BLD: 5.33 % (ref 4.8–5.6)
HCT VFR BLD AUTO: 34.4 % (ref 34–46.6)
HDLC SERPL-MCNC: 61 MG/DL (ref 40–60)
HGB BLD-MCNC: 11.5 G/DL (ref 12–15.9)
IMM GRANULOCYTES # BLD AUTO: 0.01 10*3/MM3 (ref 0–0.05)
IMM GRANULOCYTES NFR BLD AUTO: 0.2 % (ref 0–0.5)
IRON 24H UR-MRATE: 66 MCG/DL (ref 37–145)
IRON SATN MFR SERPL: 19 % (ref 20–50)
LDLC SERPL CALC-MCNC: 123 MG/DL (ref 0–100)
LDLC/HDLC SERPL: 2.01 {RATIO}
LYMPHOCYTES # BLD AUTO: 1.59 10*3/MM3 (ref 0.7–3.1)
LYMPHOCYTES NFR BLD AUTO: 31.4 % (ref 19.6–45.3)
MCH RBC QN AUTO: 27 PG (ref 26.6–33)
MCHC RBC AUTO-ENTMCNC: 33.4 G/DL (ref 31.5–35.7)
MCV RBC AUTO: 80.8 FL (ref 79–97)
MONOCYTES # BLD AUTO: 0.5 10*3/MM3 (ref 0.1–0.9)
MONOCYTES NFR BLD AUTO: 9.9 % (ref 5–12)
NEUTROPHILS # BLD AUTO: 2.76 10*3/MM3 (ref 1.7–7)
NEUTROPHILS NFR BLD AUTO: 54.5 % (ref 42.7–76)
NRBC BLD AUTO-RTO: 0 /100 WBC (ref 0–0.2)
PLATELET # BLD AUTO: 229 10*3/MM3 (ref 140–450)
PMV BLD AUTO: 12.1 FL (ref 6–12)
POTASSIUM BLD-SCNC: 4.6 MMOL/L (ref 3.5–5.2)
PROT SERPL-MCNC: 8.4 G/DL (ref 6–8.5)
RBC # BLD AUTO: 4.26 10*6/MM3 (ref 3.77–5.28)
SODIUM BLD-SCNC: 141 MMOL/L (ref 136–145)
T3FREE SERPL-MCNC: 2.41 PG/ML (ref 2–4.4)
T4 FREE SERPL-MCNC: 1.31 NG/DL (ref 0.93–1.7)
TIBC SERPL-MCNC: 340 MCG/DL (ref 298–536)
TRANSFERRIN SERPL-MCNC: 228 MG/DL (ref 200–360)
TRIGL SERPL-MCNC: 231 MG/DL (ref 0–150)
TSH SERPL DL<=0.05 MIU/L-ACNC: 0.18 MIU/ML (ref 0.27–4.2)
VIT B12 BLD-MCNC: 425 PG/ML (ref 211–946)
VLDLC SERPL-MCNC: 46.2 MG/DL (ref 5–40)
WBC NRBC COR # BLD: 5.06 10*3/MM3 (ref 3.4–10.8)

## 2019-08-15 NOTE — TELEPHONE ENCOUNTER
Patient called about her muscle relaxer script [facility] is suppose to be sending something over concerning it so she can get it filled if you can call her back at [phone]

## 2019-08-20 ENCOUNTER — TELEPHONE (OUTPATIENT)
Dept: FAMILY MEDICINE CLINIC | Facility: CLINIC | Age: 64
End: 2019-08-20

## 2019-08-20 RX ORDER — ERGOCALCIFEROL 1.25 MG/1
50000 CAPSULE ORAL WEEKLY
Qty: 4 CAPSULE | Refills: 3 | Status: SHIPPED | OUTPATIENT
Start: 2019-08-20 | End: 2020-03-26

## 2019-08-20 RX ORDER — CLOPIDOGREL BISULFATE 75 MG/1
TABLET ORAL
Qty: 30 TABLET | Refills: 5 | Status: SHIPPED | OUTPATIENT
Start: 2019-08-20 | End: 2020-03-30

## 2019-08-20 RX ORDER — FERROUS SULFATE 325(65) MG
325 TABLET ORAL
Qty: 30 TABLET | Refills: 6 | Status: SHIPPED | OUTPATIENT
Start: 2019-08-20 | End: 2020-07-16

## 2019-08-20 RX ORDER — LISINOPRIL AND HYDROCHLOROTHIAZIDE 25; 20 MG/1; MG/1
2 TABLET ORAL DAILY
Qty: 30 TABLET | Refills: 5 | Status: SHIPPED | OUTPATIENT
Start: 2019-08-20 | End: 2019-11-27 | Stop reason: SDUPTHER

## 2019-08-20 NOTE — TELEPHONE ENCOUNTER
----- Message from Brown Lion MD sent at 8/16/2019 11:48 AM CDT -----  Call pt    TSH continues to show low TSH. With normal t4 and T3.  Has subclinical hyperthyroidism. Will need to discuss about possible Thyroid US or thyroid uptake scan on next visit    Vitamin D is low and if pt is truly taking Vitamin D 50,000 units once a week. Go up from 1 to 2 pills weekly. Give new prescription with 8 pills and 3 refills     B12 normal    On CMP  Her kidney function improved but pt has CKS stage 2-3.  Recommend Nephrology referral. Let me know if she is okay with seeing Nephrologist Dr. Blount   Kidneys affected due to history of hypertension    On lipid panel her cholesterol is not at goal.  Pt was recently restarted on lipitor. Highly recommend pt start retaking this     On CBC her hemoglobin continues to be low at 11.5 with low iron saturation. Have pt go up on iron pill from 325 mg daily to BID. Give 60 pills and 3 refills     [id number] normal     Recheck on next visit     Thanks   Called pt,sent meds

## 2019-10-23 NOTE — PROGRESS NOTES
Subjective:  Nathaly Licea is a 63 y.o. female who presents for       Patient Active Problem List   Diagnosis   • Encounter for screening for diabetes mellitus   • Encounter for screening mammogram for malignant neoplasm of breast   • Encounter for screening for malignant neoplasm of colon   • Encounter for gynecological examination   • History of brain tumor   • Encounter for screening for endocrine disorder   • Encounter for screening for cardiovascular disorders   • Tobacco user   • Need for hepatitis C screening test   • Tobacco abuse counseling   • Cerebrovascular accident (CVA) (CMS/HCC)   • Need for immunization against influenza   • Chronic low back pain with bilateral sciatica   • Chronic back pain   • Right hemiparesis (CMS/HCC)   • Slurring of speech   • Hyperlipidemia   • Insomnia   • Constipation   • Iron deficiency anemia   • Hepatitis C antibody positive in blood   • Gait instability   • History of CVA (cerebrovascular accident)   • Essential hypertension   • Depression   • Deep vein thrombosis (DVT) of left upper extremity (CMS/HCC)   • Need for hepatitis vaccination   • History of cardiac arrest   • Gastroesophageal reflux disease   • Obesity   • Chronic hepatitis C without hepatic coma (CMS/HCC)   • Encounter for Papanicolaou smear of cervix   • Anemia   • General medical examination   • Irritable bowel syndrome with constipation   • Prediabetes   • Vitamin D deficiency   • Encounter for screening colonoscopy   • Screening mammogram, encounter for   • Stage 3 chronic kidney disease (CMS/HCC)           Current Outpatient Medications:   •  amLODIPine (NORVASC) 10 MG tablet, Take 1 tablet by mouth Daily., Disp: 30 tablet, Rfl: 3  •  aspirin  MG tablet, Take 1 tablet by mouth Daily., Disp: 30 tablet, Rfl: 6  •  atorvastatin (LIPITOR) 80 MG tablet, Take 1 tablet by mouth Daily., Disp: 30 tablet, Rfl: 6  •  clopidogrel (PLAVIX) 75 MG tablet, TAKE 1 TABLET BY MOUTH ONCE DAILY, Disp: 30 tablet,  Rfl: 5  •  cyclobenzaprine (FEXMID) 7.5 MG tablet, Take 1 tablet by mouth 3 (Three) Times a Day As Needed for Muscle Spasms., Disp: 90 tablet, Rfl: 2  •  ferrous sulfate 325 (65 FE) MG tablet, Take 1 tablet by mouth Daily With Breakfast. Take with 6 oz oj or a vitamin c tablet, Disp: 30 tablet, Rfl: 6  •  linaclotide (LINZESS) 145 MCG capsule capsule, Take 1 capsule by mouth Every Other Day., Disp: 30 capsule, Rfl: 6  •  lisinopril-hydrochlorothiazide (PRINZIDE,ZESTORETIC) 20-25 MG per tablet, TAKE 2 TABLETS BY MOUTH DAILY, Disp: 30 tablet, Rfl: 5  •  nicotine polacrilex (NICORETTE) 4 MG gum, Chew 1 each As Needed for Smoking Cessation., Disp: 60 each, Rfl: 6  •  pantoprazole (PROTONIX) 40 MG EC tablet, Take 1 tablet by mouth Daily., Disp: 30 tablet, Rfl: 6  •  vitamin D (ERGOCALCIFEROL) 14188 units capsule capsule, Take 1 capsule by mouth 1 (One) Time Per Week., Disp: 4 capsule, Rfl: 3      Pt is 64 yo female with management of obesity, BIS constipation, history of CVA, history of meningioma sp removal,  Tobacco user, gait instability, high fall risk GERD, vitamin D defiicency, on chronic anticoagulation, HTN, HLP, CKD stage 3, history of DVT in left upper extremity,  IBS-C, insomnia, history of hep C antibody positive in blood, history of cardiac arrest, prediabetes, iron deficiency anemia, subclinical hyperthryoidsim, history of cocaine use     8/14/19 pt is here for recheck and followup. She is due for new labwork. She continues to smoke  2 cigarettes a day. Her BP is elevated today.  No chest pain no dizziness. She did have CVA. She is still seeing Neurologist with [facility].   She also went to ER at [facility] for back pain and pinched nerve. On right side. She does not want to do PT/OT. She also has lower back that has been present for years. She did see a Pain Specialist years ago.  She has tried PT/OT in the past.\    10/31/19 pt is here for recheck and followup. Pt had labwork on 8/14/19 that showed low iron  saturation. Hemoglobin was at 11.5.  TSH was low at 0.185 with normal T3 and T4.  Vitamin D was low. Vitamin B12 levels normal, hga1c is normal. Lipid panel showed high total cholesterol and triglycerides, HDL was at 61 and LDL was at 12. CMP showed stable renal function with GFR at 68 from 56 last check. CR was at 1.01. PT was referred to GI with colonoscopy with Dr. Chakraborty .she has pending colonoscopy. She has yet to get her mammogram. She continues to smoke 1 cigarette a day . Her main concern is back pain that radiates down both legs. She has been having back pain for years. She used to work in a nursing home. It has been years since she did PT/OT       Thyroid Problem   Presents for follow-up visit. Symptoms include anxiety, cold intolerance, depressed mood, fatigue, nail problem and palpitations. Patient reports no constipation, diaphoresis, diarrhea, dry skin, hair loss, heat intolerance, hoarse voice, leg swelling, menstrual problem, tremors, visual change, weight gain or weight loss. The symptoms have been stable.   Back Pain   This is a chronic problem. The current episode started more than 1 year ago. The problem occurs constantly. The problem is unchanged. The pain is present in the lumbar spine. The quality of the pain is described as aching. The pain does not radiate. The pain is at a severity of 5/10. The pain is moderate. The symptoms are aggravated by bending, lying down and position. Associated symptoms include headaches, numbness, paresis and weakness. Pertinent negatives include no abdominal pain, bladder incontinence, bowel incontinence, chest pain, dysuria, fever, leg pain, paresthesias, pelvic pain, perianal numbness, tingling or weight loss. She has tried nothing for the symptoms. The treatment provided no relief.   Cerebrovascular Accident   This is a new problem. The current episode started more than 1 month ago. The problem occurs daily. The problem has been waxing and waning. Associated  symptoms include fatigue. Pertinent negatives include no abdominal pain, anorexia, arthralgias, change in bowel habit, chest pain, chills, congestion, coughing, diaphoresis, fever, headaches, joint swelling, myalgias, nausea, neck pain, numbness, rash, sore throat, swollen glands, urinary symptoms, vertigo, visual change, vomiting or weakness. Nothing aggravates the symptoms. She has tried nothing for the symptoms. The treatment provided no relief.   Extremity Weakness    The pain is present in the right wrist, right hand, right fingers, right arm, right shoulder, right elbow, right upper leg, right knee, right lower leg, right ankle, right foot and right toes. This is a new problem. The current episode started more than 1 month ago. There has been no history of extremity trauma. The problem occurs daily. The problem has been waxing and waning. The pain is at a severity of 3/10. The pain is mild. Pertinent negatives include no fever, inability to bear weight, itching, joint locking, joint swelling, limited range of motion, numbness, stiffness or tingling. She has tried nothing for the symptoms. The treatment provided no relief. Family history does not include gout or rheumatoid arthritis. There is no history of diabetes, gout, osteoarthritis or rheumatoid arthritis.   Depression   Visit Type: initial  Onset of symptoms: at an unknown time  Progression since onset: gradually worsening  Patient presents with the following symptoms: anhedonia, decreased concentration, depressed mood and fatigue.  Patient is not experiencing: chest pain, choking sensation, compulsions, confusion, dizziness, dry mouth, excessive worry, feelings of hopelessness, feelings of worthlessness, hypersomnia, hyperventilation, impotence, insomnia, irritability, malaise, memory impairment, muscle tension, nausea, nervousness/anxiety, obsessions, palpitations, panic, psychomotor agitation, psychomotor retardation, restlessness, shortness of  breath, suicidal ideas, suicidal planning, thoughts of death, weight gain and weight loss.  Frequency of symptoms: constantly   Severity: moderate   Risk factors: stroke.  Patient has a history of: depression  No history of: anemia, anxiety/panic attacks, arrhythmia, asthma, bipolar disorder, CAD, CHF, chronic lung disease, fibromyalgia, hyperthyroidism, suicide attempt, mental illness and substance abuse  Treatment tried: nothing  Compliance with treatment: variable       Review of Systems  Review of Systems   Constitutional: Positive for activity change and fatigue. Negative for appetite change, chills, diaphoresis, fever, weight gain and weight loss.   HENT: Negative for congestion, hoarse voice, postnasal drip, rhinorrhea, sinus pressure, sinus pain, sneezing, sore throat, trouble swallowing and voice change.    Respiratory: Positive for cough and shortness of breath. Negative for choking, chest tightness, wheezing and stridor.    Cardiovascular: Positive for palpitations. Negative for chest pain.   Gastrointestinal: Negative for constipation, diarrhea, nausea and vomiting.   Endocrine: Positive for cold intolerance. Negative for heat intolerance.   Genitourinary: Negative for menstrual problem.   Musculoskeletal: Positive for arthralgias, back pain, gait problem and joint swelling.   Neurological: Positive for weakness, numbness and headaches. Negative for tremors.   Psychiatric/Behavioral: The patient is nervous/anxious.        Patient Active Problem List   Diagnosis   • Encounter for screening for diabetes mellitus   • Encounter for screening mammogram for malignant neoplasm of breast   • Encounter for screening for malignant neoplasm of colon   • Encounter for gynecological examination   • History of brain tumor   • Encounter for screening for endocrine disorder   • Encounter for screening for cardiovascular disorders   • Tobacco user   • Need for hepatitis C screening test   • Tobacco abuse counseling   •  Cerebrovascular accident (CVA) (CMS/HCC)   • Need for immunization against influenza   • Chronic low back pain with bilateral sciatica   • Chronic back pain   • Right hemiparesis (CMS/HCC)   • Slurring of speech   • Hyperlipidemia   • Insomnia   • Constipation   • Iron deficiency anemia   • Hepatitis C antibody positive in blood   • Gait instability   • History of CVA (cerebrovascular accident)   • Essential hypertension   • Depression   • Deep vein thrombosis (DVT) of left upper extremity (CMS/HCC)   • Need for hepatitis vaccination   • History of cardiac arrest   • Gastroesophageal reflux disease   • Obesity   • Chronic hepatitis C without hepatic coma (CMS/HCC)   • Encounter for Papanicolaou smear of cervix   • Anemia   • General medical examination   • Irritable bowel syndrome with constipation   • Prediabetes   • Vitamin D deficiency   • Encounter for screening colonoscopy   • Screening mammogram, encounter for   • Stage 3 chronic kidney disease (CMS/HCC)     Past Surgical History:   Procedure Laterality Date   • BRAIN SURGERY       Social History     Socioeconomic History   • Marital status: Legally      Spouse name: Not on file   • Number of children: Not on file   • Years of education: Not on file   • Highest education level: Not on file   Tobacco Use   • Smoking status: Current Every Day Smoker     Types: Cigarettes   • Smokeless tobacco: Never Used   • Tobacco comment: 2 cigarettes a day   Substance and Sexual Activity   • Alcohol use: Yes     Comment: once a month, wine or beer   • Drug use: No   • Sexual activity: Yes     Partners: Male     Birth control/protection: None     Family History   Problem Relation Age of Onset   • Alcohol abuse Other    • Diabetes Other    • Heart disease Other    • Hypertension Other    • COPD Other    • Alcohol abuse Mother    • Arthritis Mother    • COPD Mother    • Heart disease Mother    • Hypertension Mother    • Stroke Mother    • Diabetes Father    • Heart  disease Father    • Diabetes Paternal Grandmother      Lab on 08/14/2019   Component Date Value Ref Range Status   • WBC 08/14/2019 5.06  3.40 - 10.80 10*3/mm3 Final   • RBC 08/14/2019 4.26  3.77 - 5.28 10*6/mm3 Final   • Hemoglobin 08/14/2019 11.5* 12.0 - 15.9 g/dL Final   • Hematocrit 08/14/2019 34.4  34.0 - 46.6 % Final   • MCV 08/14/2019 80.8  79.0 - 97.0 fL Final   • MCH 08/14/2019 27.0  26.6 - 33.0 pg Final   • MCHC 08/14/2019 33.4  31.5 - 35.7 g/dL Final   • RDW 08/14/2019 17.2* 12.3 - 15.4 % Final   • RDW-SD 08/14/2019 50.4  37.0 - 54.0 fl Final   • MPV 08/14/2019 12.1* 6.0 - 12.0 fL Final   • Platelets 08/14/2019 229  140 - 450 10*3/mm3 Final   • Neutrophil % 08/14/2019 54.5  42.7 - 76.0 % Final   • Lymphocyte % 08/14/2019 31.4  19.6 - 45.3 % Final   • Monocyte % 08/14/2019 9.9  5.0 - 12.0 % Final   • Eosinophil % 08/14/2019 3.4  0.3 - 6.2 % Final   • Basophil % 08/14/2019 0.6  0.0 - 1.5 % Final   • Immature Grans % 08/14/2019 0.2  0.0 - 0.5 % Final   • Neutrophils, Absolute 08/14/2019 2.76  1.70 - 7.00 10*3/mm3 Final   • Lymphocytes, Absolute 08/14/2019 1.59  0.70 - 3.10 10*3/mm3 Final   • Monocytes, Absolute 08/14/2019 0.50  0.10 - 0.90 10*3/mm3 Final   • Eosinophils, Absolute 08/14/2019 0.17  0.00 - 0.40 10*3/mm3 Final   • Basophils, Absolute 08/14/2019 0.03  0.00 - 0.20 10*3/mm3 Final   • Immature Grans, Absolute 08/14/2019 0.01  0.00 - 0.05 10*3/mm3 Final   • nRBC 08/14/2019 0.0  0.0 - 0.2 /100 WBC Final   • Glucose 08/14/2019 108* 65 - 99 mg/dL Final   • BUN 08/14/2019 22  8 - 23 mg/dL Final   • Creatinine 08/14/2019 1.01* 0.57 - 1.00 mg/dL Final   • Sodium 08/14/2019 141  136 - 145 mmol/L Final   • Potassium 08/14/2019 4.6  3.5 - 5.2 mmol/L Final   • Chloride 08/14/2019 103  98 - 107 mmol/L Final   • CO2 08/14/2019 24.9  22.0 - 29.0 mmol/L Final   • Calcium 08/14/2019 10.1  8.6 - 10.5 mg/dL Final   • Total Protein 08/14/2019 8.4  6.0 - 8.5 g/dL Final   • Albumin 08/14/2019 4.40  3.50 - 5.20 g/dL  "Final   • ALT (SGPT) 08/14/2019 10  1 - 33 U/L Final   • AST (SGOT) 08/14/2019 14  1 - 32 U/L Final   • Alkaline Phosphatase 08/14/2019 53  39 - 117 U/L Final   • Total Bilirubin 08/14/2019 0.2  0.2 - 1.2 mg/dL Final   • eGFR   Amer 08/14/2019 68  >60 mL/min/1.73 Final   • Globulin 08/14/2019 4.0  gm/dL Final   • A/G Ratio 08/14/2019 1.1  g/dL Final   • BUN/Creatinine Ratio 08/14/2019 21.8  7.0 - 25.0 Final   • Anion Gap 08/14/2019 13.1  5.0 - 15.0 mmol/L Final   • Hemoglobin A1C 08/14/2019 5.33  4.80 - 5.60 % Final   • Total Cholesterol 08/14/2019 230* 0 - 200 mg/dL Final   • Triglycerides 08/14/2019 231* 0 - 150 mg/dL Final   • HDL Cholesterol 08/14/2019 61* 40 - 60 mg/dL Final   • LDL Cholesterol  08/14/2019 123* 0 - 100 mg/dL Final   • VLDL Cholesterol 08/14/2019 46.2* 5 - 40 mg/dL Final   • LDL/HDL Ratio 08/14/2019 2.01   Final   • TSH 08/14/2019 0.185* 0.270 - 4.200 mIU/mL Final   • Free T4 08/14/2019 1.31  0.93 - 1.70 ng/dL Final   • T3, Free 08/14/2019 2.41  2.00 - 4.40 pg/mL Final   • 25 Hydroxy, Vitamin D 08/14/2019 14.6* 30.0 - 100.0 ng/ml Final   • Vitamin B-12 08/14/2019 425  211 - 946 pg/mL Final   • Iron 08/14/2019 66  37 - 145 mcg/dL Final   • Iron Saturation 08/14/2019 19* 20 - 50 % Final   • Transferrin 08/14/2019 228  200 - 360 mg/dL Final   • TIBC 08/14/2019 340  298 - 536 mcg/dL Final   • Ferritin 08/14/2019 227.00* 13.00 - 150.00 ng/mL Final      No image results found.    [unfilled]  Immunization History   Administered Date(s) Administered   • Flu Vaccine Quad PF >36MO 09/27/2017   • Hepatitis A 12/15/2017   • Hepatitis B Vaccine Adult IM 12/15/2017, 01/26/2018       The following portions of the patient's history were reviewed and updated as appropriate: allergies, current medications, past family history, past medical history, past social history, past surgical history and problem list.        Physical Exam  /80   Pulse 74   Temp 98.2 °F (36.8 °C)   Ht 170.2 cm (67\")   Wt " 103 kg (227 lb)   SpO2 97%   BMI 35.55 kg/m²     Physical Exam   Constitutional: She is oriented to person, place, and time. She appears well-developed and well-nourished.   HENT:   Head: Normocephalic and atraumatic.   Right Ear: External ear normal.   Eyes: Conjunctivae and EOM are normal. Pupils are equal, round, and reactive to light.   Neck: Normal range of motion. Neck supple.   Cardiovascular: Normal rate, regular rhythm and normal heart sounds.   No murmur heard.  Pulmonary/Chest: Effort normal and breath sounds normal. No respiratory distress.   Abdominal: Soft. Bowel sounds are normal. She exhibits no distension. There is no tenderness.   Obese abdomen    Musculoskeletal: She exhibits tenderness. She exhibits no edema or deformity.        Lumbar back: She exhibits decreased range of motion, tenderness, bony tenderness, pain and spasm.   Neurological: She is alert and oriented to person, place, and time. No cranial nerve deficit.   Skin: Skin is warm. No rash noted. She is not diaphoretic. No erythema. No pallor.   Psychiatric: She has a normal mood and affect. Her behavior is normal.   Nursing note and vitals reviewed.      Assessment/Plan    Diagnosis Plan   1. Class 2 obesity with body mass index (BMI) of 36.0 to 36.9 in adult, unspecified obesity type, unspecified whether serious comorbidity present     2. Stage 3 chronic kidney disease (CMS/HCC)     3. Tobacco abuse counseling     4. Tobacco user     5. Vitamin D deficiency     6. Insomnia, unspecified type     7. Hyperlipidemia, unspecified hyperlipidemia type     8. History of CVA (cerebrovascular accident)     9. Gastroesophageal reflux disease, esophagitis presence not specified     10. Gait instability     11. Essential hypertension     12. Deep vein thrombosis (DVT) of left upper extremity, unspecified chronicity, unspecified vein (CMS/HCC)     13. Constipation, unspecified constipation type     14. Chronic hepatitis C without hepatic coma  (CMS/HCC)     15. Cerebrovascular accident (CVA), unspecified mechanism (CMS/HCC)     16. Subclinical hyperthyroidism     17. History of prediabetes     18. Iron deficiency anemia, unspecified iron deficiency anemia type             -will get labwork   -recommend influenza vaccination   -subclincal hyperthyroidism- recommend thyroid uptake scan along with thyroid ultrasound. Consider methimazole   -for hypertension - BP  Not  at goal today. Continue on norvasc 10 mg PO q daily .She is to continue lisinopril-HCTZ 20-25 mg PO daily. Start on toprol XL 25 mg daily.    -xray of lower back pt  Recommend PT/OT  Will get x-ray of lumbar spine.  Will do short term coarse of tramadol 50 mg every 8 hours PRN.  For 5 days. Consider long term coarse. Pending UDS. Will refer to pain management. Pt cannot take NSAIDS due to increased risk of bleeding with plavix   - will reschedule mammogram screening at imaging center  -for pap smear - referral to [facility] Consultation appreciated  - iron deficiency anemia - pt on iron supplements. Recommend Hematology referral for possible iron IV therapy   -colonoscopy screening with Dr. Loza. soon. She will need to hold plavix at least 5 days before procedure  -GERD - continue  on protonix 40 mg daily. Gave food choices for GERD to go home with . Gave drug information to go home with. Pt may need EGD in near future  - tobacco user- counseled pt to stop smoking. Counseled >5 minutes she did not like chantix.  Continue nicotine gum  Drug information given  -history of DVT left upper extrremity -recommend repeat US of LUE to see if it resolved  -history of CVA - on aspirin/plavix  - pt advised to call for reappt with [facility] for pap smear  - for history of CVA /gait instability/slurring of speech- pt is seeing Neurologist in Neurologist next month. Clinically stable   - for hyperlipidemia - lipitor 80 mg PO qhs recheck lipid panel   - depression - stable. Pt is off celexa  now  - for constipation/IBS  - better with linzess but will go up from 72 to 144 mcg every other day. Samples provided today.   -obesity -  counseled weight loss >5 miinutes   - for chronic back patient adivsed to call appt with Pain Management soon   -get labwork on next visit   -advised pt to be safe and call with questions and concerns  -advised to go to ER or call 911 if symptoms worrisome or severe  -advised to followup with Specialist and referrals  -recheck in 1 month        This document has been electronically signed by Brown Lion MD on October 31, 2019 12:49 PM

## 2019-10-24 ENCOUNTER — TELEPHONE (OUTPATIENT)
Dept: FAMILY MEDICINE CLINIC | Facility: CLINIC | Age: 64
End: 2019-10-24

## 2019-10-24 NOTE — TELEPHONE ENCOUNTER
Not cardiac clearance, needs okay to stop Plavix for colonoscopy please call him back at [phone]-ext [phone]

## 2019-10-24 NOTE — TELEPHONE ENCOUNTER
He left in the message cardiac clearance. I put in exactly what he said in the first message. Please advise.

## 2019-10-24 NOTE — TELEPHONE ENCOUNTER
Based on pt's history and clinical issues. She needs to see a Cardiologist. She has an upcoming appt soon.

## 2019-10-25 ENCOUNTER — TELEPHONE (OUTPATIENT)
Dept: FAMILY MEDICINE CLINIC | Facility: CLINIC | Age: 64
End: 2019-10-25

## 2019-10-25 NOTE — TELEPHONE ENCOUNTER
I wrote a letter to allow pt to stop her plavix.  Please print the most recent one and fax to Dr. Loza Office please. Thanks

## 2019-10-31 ENCOUNTER — OFFICE VISIT (OUTPATIENT)
Dept: FAMILY MEDICINE CLINIC | Facility: CLINIC | Age: 64
End: 2019-10-31

## 2019-10-31 VITALS
TEMPERATURE: 98.2 F | WEIGHT: 227 LBS | HEART RATE: 74 BPM | OXYGEN SATURATION: 97 % | HEIGHT: 67 IN | DIASTOLIC BLOOD PRESSURE: 80 MMHG | SYSTOLIC BLOOD PRESSURE: 140 MMHG | BODY MASS INDEX: 35.63 KG/M2

## 2019-10-31 DIAGNOSIS — Z86.73 HISTORY OF CVA (CEREBROVASCULAR ACCIDENT): ICD-10-CM

## 2019-10-31 DIAGNOSIS — Z87.898 HISTORY OF PREDIABETES: ICD-10-CM

## 2019-10-31 DIAGNOSIS — E66.9 CLASS 2 OBESITY WITH BODY MASS INDEX (BMI) OF 36.0 TO 36.9 IN ADULT, UNSPECIFIED OBESITY TYPE, UNSPECIFIED WHETHER SERIOUS COMORBIDITY PRESENT: ICD-10-CM

## 2019-10-31 DIAGNOSIS — G47.00 INSOMNIA, UNSPECIFIED TYPE: ICD-10-CM

## 2019-10-31 DIAGNOSIS — N18.30 STAGE 3 CHRONIC KIDNEY DISEASE (HCC): ICD-10-CM

## 2019-10-31 DIAGNOSIS — M54.41 BILATERAL LOW BACK PAIN WITH BILATERAL SCIATICA, UNSPECIFIED CHRONICITY: ICD-10-CM

## 2019-10-31 DIAGNOSIS — R26.81 GAIT INSTABILITY: ICD-10-CM

## 2019-10-31 DIAGNOSIS — I10 ESSENTIAL HYPERTENSION: ICD-10-CM

## 2019-10-31 DIAGNOSIS — K59.00 CONSTIPATION, UNSPECIFIED CONSTIPATION TYPE: ICD-10-CM

## 2019-10-31 DIAGNOSIS — Z02.83 ENCOUNTER FOR DRUG SCREENING: ICD-10-CM

## 2019-10-31 DIAGNOSIS — I82.622 DEEP VEIN THROMBOSIS (DVT) OF LEFT UPPER EXTREMITY, UNSPECIFIED CHRONICITY, UNSPECIFIED VEIN (HCC): Primary | ICD-10-CM

## 2019-10-31 DIAGNOSIS — Z72.0 TOBACCO USER: ICD-10-CM

## 2019-10-31 DIAGNOSIS — E05.90 SUBCLINICAL HYPERTHYROIDISM: ICD-10-CM

## 2019-10-31 DIAGNOSIS — M54.42 BILATERAL LOW BACK PAIN WITH BILATERAL SCIATICA, UNSPECIFIED CHRONICITY: ICD-10-CM

## 2019-10-31 DIAGNOSIS — Z71.6 TOBACCO ABUSE COUNSELING: ICD-10-CM

## 2019-10-31 DIAGNOSIS — E55.9 VITAMIN D DEFICIENCY: ICD-10-CM

## 2019-10-31 DIAGNOSIS — E78.5 HYPERLIPIDEMIA, UNSPECIFIED HYPERLIPIDEMIA TYPE: ICD-10-CM

## 2019-10-31 DIAGNOSIS — D50.9 IRON DEFICIENCY ANEMIA, UNSPECIFIED IRON DEFICIENCY ANEMIA TYPE: ICD-10-CM

## 2019-10-31 DIAGNOSIS — I63.9 CEREBROVASCULAR ACCIDENT (CVA), UNSPECIFIED MECHANISM (HCC): ICD-10-CM

## 2019-10-31 DIAGNOSIS — K21.9 GASTROESOPHAGEAL REFLUX DISEASE, ESOPHAGITIS PRESENCE NOT SPECIFIED: ICD-10-CM

## 2019-10-31 DIAGNOSIS — B18.2 CHRONIC HEPATITIS C WITHOUT HEPATIC COMA (HCC): ICD-10-CM

## 2019-10-31 PROCEDURE — 90471 IMMUNIZATION ADMIN: CPT | Performed by: FAMILY MEDICINE

## 2019-10-31 PROCEDURE — 90674 CCIIV4 VAC NO PRSV 0.5 ML IM: CPT | Performed by: FAMILY MEDICINE

## 2019-10-31 PROCEDURE — 99214 OFFICE O/P EST MOD 30 MIN: CPT | Performed by: FAMILY MEDICINE

## 2019-10-31 RX ORDER — METOPROLOL SUCCINATE 25 MG/1
25 TABLET, EXTENDED RELEASE ORAL DAILY
Qty: 30 TABLET | Refills: 3 | Status: SHIPPED | OUTPATIENT
Start: 2019-10-31 | End: 2020-02-13 | Stop reason: SDUPTHER

## 2019-10-31 RX ORDER — TRAMADOL HYDROCHLORIDE 50 MG/1
50 TABLET ORAL EVERY 6 HOURS PRN
Qty: 20 TABLET | Refills: 0 | Status: SHIPPED | OUTPATIENT
Start: 2019-10-31 | End: 2019-12-05

## 2019-10-31 NOTE — PATIENT INSTRUCTIONS
Get x-ray of back    See Physical therapy  -get urine drug screen in lab    Will refer to pain Management    Will get US of thyroid along with thyroid utpake scan for overactive thyroid    Start on toprol XL 25 mg daily for blood pressure    Recheck in 1 month    Stop plavix 1 week  Before your colonoscopy.      Will get ultrasound of left upper arm    Metoprolol extended-release tablets  What is this medicine?  METOPROLOL (me TOE proe lole) is a beta-blocker. Beta-blockers reduce the workload on the heart and help it to beat more regularly. This medicine is used to treat high blood pressure and to prevent chest pain. It is also used to after a heart attack and to prevent an additional heart attack from occurring.  This medicine may be used for other purposes; ask your health care provider or pharmacist if you have questions.  COMMON BRAND NAME(S): toprol, Toprol XL  What should I tell my health care provider before I take this medicine?  They need to know if you have any of these conditions:  -diabetes  -heart or vessel disease like slow heart rate, worsening heart failure, heart block, sick sinus syndrome or Raynaud's disease  -kidney disease  -liver disease  -lung or breathing disease, like asthma or emphysema  -pheochromocytoma  -thyroid disease  -an unusual or allergic reaction to metoprolol, other beta-blockers, medicines, foods, dyes, or preservatives  -pregnant or trying to get pregnant  -breast-feeding  How should I use this medicine?  Take this medicine by mouth with a glass of water. Follow the directions on the prescription label. Do not crush or chew. Take this medicine with or immediately after meals. Take your doses at regular intervals. Do not take more medicine than directed. Do not stop taking this medicine suddenly. This could lead to serious heart-related effects.  Talk to your pediatrician regarding the use of this medicine in children. While this drug may be prescribed for children as young as  6 years for selected conditions, precautions do apply.  Overdosage: If you think you have taken too much of this medicine contact a poison control center or emergency room at once.  NOTE: This medicine is only for you. Do not share this medicine with others.  What if I miss a dose?  If you miss a dose, take it as soon as you can. If it is almost time for your next dose, take only that dose. Do not take double or extra doses.  What may interact with this medicine?  This medicine may interact with the following medications:  -certain medicines for blood pressure, heart disease, irregular heart beat  -certain medicines for depression, like monoamine oxidase (MAO) inhibitors, fluoxetine, or paroxetine  -clonidine  -dobutamine  -epinephrine  -isoproterenol  -reserpine  This list may not describe all possible interactions. Give your health care provider a list of all the medicines, herbs, non-prescription drugs, or dietary supplements you use. Also tell them if you smoke, drink alcohol, or use illegal drugs. Some items may interact with your medicine.  What should I watch for while using this medicine?  Visit your doctor or health care professional for regular check ups. Contact your doctor right away if your symptoms worsen. Check your blood pressure and pulse rate regularly. Ask your health care professional what your blood pressure and pulse rate should be, and when you should contact them.  You may get drowsy or dizzy. Do not drive, use machinery, or do anything that needs mental alertness until you know how this medicine affects you. Do not sit or stand up quickly, especially if you are an older patient. This reduces the risk of dizzy or fainting spells. Contact your doctor if these symptoms continue. Alcohol may interfere with the effect of this medicine. Avoid alcoholic drinks.  What side effects may I notice from receiving this medicine?  Side effects that you should report to your doctor or health care  professional as soon as possible:  -allergic reactions like skin rash, itching or hives  -cold or numb hands or feet  -depression  -difficulty breathing  -faint  -fever with sore throat  -irregular heartbeat, chest pain  -rapid weight gain  -swollen legs or ankles  Side effects that usually do not require medical attention (report to your doctor or health care professional if they continue or are bothersome):  -anxiety or nervousness  -change in sex drive or performance  -dry skin  -headache  -nightmares or trouble sleeping  -short term memory loss  -stomach upset or diarrhea  -unusually tired  This list may not describe all possible side effects. Call your doctor for medical advice about side effects. You may report side effects to FDA at 6-440-FDA-7513.  Where should I keep my medicine?  Keep out of the reach of children.  Store at room temperature between 15 and 30 degrees C (59 and 86 degrees F). Throw away any unused medicine after the expiration date.  NOTE: This sheet is a summary. It may not cover all possible information. If you have questions about this medicine, talk to your doctor, pharmacist, or health care provider.  © 2019 [employer]vier/Gold Standard (2014-08-22 14:41:37)

## 2019-11-11 ENCOUNTER — LAB (OUTPATIENT)
Dept: LAB | Facility: HOSPITAL | Age: 64
End: 2019-11-11

## 2019-11-11 DIAGNOSIS — M54.42 BILATERAL LOW BACK PAIN WITH BILATERAL SCIATICA, UNSPECIFIED CHRONICITY: ICD-10-CM

## 2019-11-11 DIAGNOSIS — M54.41 BILATERAL LOW BACK PAIN WITH BILATERAL SCIATICA, UNSPECIFIED CHRONICITY: ICD-10-CM

## 2019-11-11 DIAGNOSIS — Z02.83 ENCOUNTER FOR DRUG SCREENING: ICD-10-CM

## 2019-11-13 ENCOUNTER — TELEPHONE (OUTPATIENT)
Dept: FAMILY MEDICINE CLINIC | Facility: CLINIC | Age: 64
End: 2019-11-13

## 2019-11-13 NOTE — TELEPHONE ENCOUNTER
Patient left voice message to call her with no reason given.  Tried calling no answer no voicemail

## 2019-11-20 ENCOUNTER — TELEPHONE (OUTPATIENT)
Dept: FAMILY MEDICINE CLINIC | Facility: CLINIC | Age: 64
End: 2019-11-20

## 2019-11-20 NOTE — TELEPHONE ENCOUNTER
Spoke with patient and made aware of appointment for 12-2-2019 at [facility] for duplex venous upper extremity left.

## 2019-11-20 NOTE — TELEPHONE ENCOUNTER
----- Message from Lily Shelton LPN sent at 11/20/2019  8:38 AM CST -----      ----- Message -----  From: Brown Lion MD  Sent: 11/12/2019  12:22 PM  To: Lily Shelton LPN    Please let pt know she has degnerative changes of thoracic spine and disc space narrowoing in lumbar-sacral disc. Recommend PT/OT if pt interested. Let me know. Thanks

## 2019-11-20 NOTE — TELEPHONE ENCOUNTER
----- Message from Brown Lion MD sent at 11/19/2019  3:40 PM CST -----  How about arm pain/arm swelling M79.603 or R22.31  ----- Message -----  From: Lily Shelton LPN  Sent: 11/19/2019   2:35 PM  To: Brown Lion MD    [facility] will not schedule her venous scan with the dx of dvt.  SHe has to be having symptoms.

## 2019-11-25 DIAGNOSIS — Z86.73 HISTORY OF CVA (CEREBROVASCULAR ACCIDENT): ICD-10-CM

## 2019-11-25 DIAGNOSIS — R26.81 GAIT INSTABILITY: ICD-10-CM

## 2019-11-25 DIAGNOSIS — G89.29 CHRONIC LOW BACK PAIN WITH BILATERAL SCIATICA, UNSPECIFIED BACK PAIN LATERALITY: Primary | ICD-10-CM

## 2019-11-25 DIAGNOSIS — M47.816 ARTHRITIS, LUMBAR SPINE: ICD-10-CM

## 2019-11-25 DIAGNOSIS — M54.42 CHRONIC LOW BACK PAIN WITH BILATERAL SCIATICA, UNSPECIFIED BACK PAIN LATERALITY: Primary | ICD-10-CM

## 2019-11-25 DIAGNOSIS — M54.41 CHRONIC LOW BACK PAIN WITH BILATERAL SCIATICA, UNSPECIFIED BACK PAIN LATERALITY: Primary | ICD-10-CM

## 2019-11-25 DIAGNOSIS — Z91.81 AT HIGH RISK FOR FALLS: ICD-10-CM

## 2019-11-25 NOTE — TELEPHONE ENCOUNTER
Please call pt and let her know PT/Ot has been ordered and she can come to the clinic downstairs to schedule an appt with PT/OT. Thanks

## 2019-11-26 ENCOUNTER — TELEPHONE (OUTPATIENT)
Dept: FAMILY MEDICINE CLINIC | Facility: CLINIC | Age: 64
End: 2019-11-26

## 2019-11-26 NOTE — TELEPHONE ENCOUNTER
----- Message from Brown Lion MD sent at 11/26/2019  7:50 AM CST -----  Regarding: Ultrasound of thyroid   Please call pt and on ultrasound of thyroid dated on 11/20/19.  There were benign appearing non cancerous nodules less than 1 cm in size. Repeat imaging in one year to assess stability. Thanks

## 2019-11-27 RX ORDER — LISINOPRIL AND HYDROCHLOROTHIAZIDE 25; 20 MG/1; MG/1
2 TABLET ORAL DAILY
Qty: 30 TABLET | Refills: 0 | Status: SHIPPED | OUTPATIENT
Start: 2019-11-27 | End: 2019-12-15 | Stop reason: SDUPTHER

## 2019-12-03 DIAGNOSIS — E05.90 SUBCLINICAL HYPERTHYROIDISM: ICD-10-CM

## 2019-12-05 ENCOUNTER — APPOINTMENT (OUTPATIENT)
Dept: LAB | Facility: HOSPITAL | Age: 64
End: 2019-12-05

## 2019-12-05 ENCOUNTER — OFFICE VISIT (OUTPATIENT)
Dept: FAMILY MEDICINE CLINIC | Facility: CLINIC | Age: 64
End: 2019-12-05

## 2019-12-05 VITALS
DIASTOLIC BLOOD PRESSURE: 76 MMHG | HEART RATE: 51 BPM | TEMPERATURE: 98 F | SYSTOLIC BLOOD PRESSURE: 124 MMHG | BODY MASS INDEX: 35.09 KG/M2 | HEIGHT: 67 IN | WEIGHT: 223.6 LBS | OXYGEN SATURATION: 99 %

## 2019-12-05 DIAGNOSIS — K58.1 IRRITABLE BOWEL SYNDROME WITH CONSTIPATION: ICD-10-CM

## 2019-12-05 DIAGNOSIS — E78.5 HYPERLIPIDEMIA, UNSPECIFIED HYPERLIPIDEMIA TYPE: ICD-10-CM

## 2019-12-05 DIAGNOSIS — K59.00 CONSTIPATION, UNSPECIFIED CONSTIPATION TYPE: ICD-10-CM

## 2019-12-05 DIAGNOSIS — G47.00 INSOMNIA, UNSPECIFIED TYPE: ICD-10-CM

## 2019-12-05 DIAGNOSIS — R73.03 PREDIABETES: ICD-10-CM

## 2019-12-05 DIAGNOSIS — M54.42 CHRONIC LOW BACK PAIN WITH BILATERAL SCIATICA, UNSPECIFIED BACK PAIN LATERALITY: ICD-10-CM

## 2019-12-05 DIAGNOSIS — I10 ESSENTIAL HYPERTENSION: ICD-10-CM

## 2019-12-05 DIAGNOSIS — I82.622 DEEP VEIN THROMBOSIS (DVT) OF LEFT UPPER EXTREMITY, UNSPECIFIED CHRONICITY, UNSPECIFIED VEIN (HCC): ICD-10-CM

## 2019-12-05 DIAGNOSIS — R76.8 HEPATITIS C ANTIBODY POSITIVE IN BLOOD: ICD-10-CM

## 2019-12-05 DIAGNOSIS — Z87.898 HISTORY OF BRAIN TUMOR: ICD-10-CM

## 2019-12-05 DIAGNOSIS — D50.9 IRON DEFICIENCY ANEMIA, UNSPECIFIED IRON DEFICIENCY ANEMIA TYPE: ICD-10-CM

## 2019-12-05 DIAGNOSIS — G89.29 CHRONIC LOW BACK PAIN WITH BILATERAL SCIATICA, UNSPECIFIED BACK PAIN LATERALITY: ICD-10-CM

## 2019-12-05 DIAGNOSIS — M54.41 CHRONIC LOW BACK PAIN WITH BILATERAL SCIATICA, UNSPECIFIED BACK PAIN LATERALITY: ICD-10-CM

## 2019-12-05 DIAGNOSIS — F32.A DEPRESSION, UNSPECIFIED DEPRESSION TYPE: ICD-10-CM

## 2019-12-05 DIAGNOSIS — M47.816 ARTHRITIS, LUMBAR SPINE: ICD-10-CM

## 2019-12-05 DIAGNOSIS — R26.81 GAIT INSTABILITY: ICD-10-CM

## 2019-12-05 DIAGNOSIS — Z86.73 HISTORY OF CVA (CEREBROVASCULAR ACCIDENT): ICD-10-CM

## 2019-12-05 DIAGNOSIS — M47.814 THORACIC ARTHRITIS: ICD-10-CM

## 2019-12-05 DIAGNOSIS — I63.9 CEREBROVASCULAR ACCIDENT (CVA), UNSPECIFIED MECHANISM (HCC): ICD-10-CM

## 2019-12-05 DIAGNOSIS — G81.91 RIGHT HEMIPARESIS (HCC): Primary | ICD-10-CM

## 2019-12-05 DIAGNOSIS — Z71.6 TOBACCO ABUSE COUNSELING: ICD-10-CM

## 2019-12-05 DIAGNOSIS — Z72.0 TOBACCO USER: ICD-10-CM

## 2019-12-05 DIAGNOSIS — K21.9 GASTROESOPHAGEAL REFLUX DISEASE, ESOPHAGITIS PRESENCE NOT SPECIFIED: ICD-10-CM

## 2019-12-05 DIAGNOSIS — N18.30 STAGE 3 CHRONIC KIDNEY DISEASE (HCC): ICD-10-CM

## 2019-12-05 DIAGNOSIS — E55.9 VITAMIN D DEFICIENCY: ICD-10-CM

## 2019-12-05 PROCEDURE — 80307 DRUG TEST PRSMV CHEM ANLYZR: CPT

## 2019-12-05 PROCEDURE — 99214 OFFICE O/P EST MOD 30 MIN: CPT | Performed by: FAMILY MEDICINE

## 2019-12-05 RX ORDER — ATORVASTATIN CALCIUM 80 MG/1
80 TABLET, FILM COATED ORAL DAILY
Qty: 30 TABLET | Refills: 6 | Status: SHIPPED | OUTPATIENT
Start: 2019-12-05 | End: 2020-07-22 | Stop reason: SDUPTHER

## 2019-12-05 RX ORDER — ASPIRIN 325 MG
325 TABLET, DELAYED RELEASE (ENTERIC COATED) ORAL DAILY
Qty: 30 TABLET | Refills: 6 | Status: SHIPPED | OUTPATIENT
Start: 2019-12-05

## 2019-12-05 RX ORDER — DULOXETIN HYDROCHLORIDE 30 MG/1
30 CAPSULE, DELAYED RELEASE ORAL 2 TIMES DAILY
Qty: 60 CAPSULE | Refills: 3 | Status: SHIPPED | OUTPATIENT
Start: 2019-12-05 | End: 2019-12-06 | Stop reason: SDUPTHER

## 2019-12-05 NOTE — PATIENT INSTRUCTIONS
Get labwork soon    Call Dr. Parham Neurologist and ask if aspirin and plavix can be stopped a few weeks before your colonoscopy     Start on cymbalta 30 mg twice a day for pain and depression Duloxetine delayed-release capsules  What is this medicine?  DULOXETINE (doo LOX e teen) is used to treat depression, anxiety, and different types of chronic pain.  This medicine may be used for other purposes; ask your health care provider or pharmacist if you have questions.  COMMON BRAND NAME(S): Amanda Fink  What should I tell my health care provider before I take this medicine?  They need to know if you have any of these conditions:  -bipolar disorder or a family history of bipolar disorder  -glaucoma  -kidney disease  -liver disease  -suicidal thoughts or a previous suicide attempt  -taken medicines called MAOIs like Carbex, Eldepryl, Marplan, Nardil, and Parnate within 14 days  -an unusual reaction to duloxetine, other medicines, foods, dyes, or preservatives  -pregnant or trying to get pregnant  -breast-feeding  How should I use this medicine?  Take this medicine by mouth with a glass of water. Follow the directions on the prescription label. Do not cut, crush or chew this medicine. You can take this medicine with or without food. Take your medicine at regular intervals. Do not take your medicine more often than directed. Do not stop taking this medicine suddenly except upon the advice of your doctor. Stopping this medicine too quickly may cause serious side effects or your condition may worsen.  A special MedGuide will be given to you by the pharmacist with each prescription and refill. Be sure to read this information carefully each time.  Talk to your pediatrician regarding the use of this medicine in children. While this drug may be prescribed for children as young as 7 years of age for selected conditions, precautions do apply.  Overdosage: If you think you have taken too much of this medicine contact a  poison control center or emergency room at once.  NOTE: This medicine is only for you. Do not share this medicine with others.  What if I miss a dose?  If you miss a dose, take it as soon as you can. If it is almost time for your next dose, take only that dose. Do not take double or extra doses.  What may interact with this medicine?  Do not take this medicine with any of the following medications:  -desvenlafaxine  -levomilnacipran  -linezolid  -MAOIs like Carbex, Eldepryl, Marplan, Nardil, and Parnate  -methylene blue (injected into a vein)  -milnacipran  -thioridazine  -venlafaxine  This medicine may also interact with the following medications:  -alcohol  -amphetamines  -aspirin and aspirin-like medicines  -certain antibiotics like ciprofloxacin and enoxacin  -certain medicines for blood pressure, heart disease, irregular heart beat  -certain medicines for depression, anxiety, or psychotic disturbances  -certain medicines for migraine headache like almotriptan, eletriptan, frovatriptan, naratriptan, rizatriptan, sumatriptan, zolmitriptan  -certain medicines that treat or prevent blood clots like warfarin, enoxaparin, and dalteparin  -cimetidine  -fentanyl  -lithium  -NSAIDS, medicines for pain and inflammation, like ibuprofen or naproxen  -phentermine  -procarbazine  -rasagiline  -sibutramine  -Iron Post's wort  -theophylline  -tramadol  -tryptophan  This list may not describe all possible interactions. Give your health care provider a list of all the medicines, herbs, non-prescription drugs, or dietary supplements you use. Also tell them if you smoke, drink alcohol, or use illegal drugs. Some items may interact with your medicine.  What should I watch for while using this medicine?  Tell your doctor if your symptoms do not get better or if they get worse. Visit your doctor or health care professional for regular checks on your progress. Because it may take several weeks to see the full effects of this medicine,  it is important to continue your treatment as prescribed by your doctor.  Patients and their families should watch out for new or worsening thoughts of suicide or depression. Also watch out for sudden changes in feelings such as feeling anxious, agitated, panicky, irritable, hostile, aggressive, impulsive, severely restless, overly excited and hyperactive, or not being able to sleep. If this happens, especially at the beginning of treatment or after a change in dose, call your health care professional.  You may get drowsy or dizzy. Do not drive, use machinery, or do anything that needs mental alertness until you know how this medicine affects you. Do not stand or sit up quickly, especially if you are an older patient. This reduces the risk of dizzy or fainting spells. Alcohol may interfere with the effect of this medicine. Avoid alcoholic drinks.  This medicine can cause an increase in blood pressure. This medicine can also cause a sudden drop in your blood pressure, which may make you feel faint and increase the chance of a fall. These effects are most common when you first start the medicine or when the dose is increased, or during use of other medicines that can cause a sudden drop in blood pressure. Check with your doctor for instructions on monitoring your blood pressure while taking this medicine.  Your mouth may get dry. Chewing sugarless gum or sucking hard candy, and drinking plenty of water may help. Contact your doctor if the problem does not go away or is severe.  What side effects may I notice from receiving this medicine?  Side effects that you should report to your doctor or health care professional as soon as possible:  -allergic reactions like skin rash, itching or hives, swelling of the face, lips, or tongue  -anxious  -breathing problems  -confusion  -changes in vision  -chest pain  -confusion  -elevated mood, decreased need for sleep, racing thoughts, impulsive behavior  -eye pain  -fast,  irregular heartbeat  -feeling faint or lightheaded, falls  -feeling agitated, angry, or irritable  -hallucination, loss of contact with reality  -high blood pressure  -loss of balance or coordination  -palpitations  -redness, blistering, peeling or loosening of the skin, including inside the mouth  -restlessness, pacing, inability to keep still  -seizures  -stiff muscles  -suicidal thoughts or other mood changes  -trouble passing urine or change in the amount of urine  -trouble sleeping  -unusual bleeding or bruising  -unusually weak or tired  -vomiting  -yellowing of the eyes or skin  Side effects that usually do not require medical attention (report to your doctor or health care professional if they continue or are bothersome):  -change in sex drive or performance  -change in appetite or weight  -constipation  -dizziness  -dry mouth  -headache  -increased sweating  -nausea  -tired  This list may not describe all possible side effects. Call your doctor for medical advice about side effects. You may report side effects to FDA at 5-513-FDA-8124.  Where should I keep my medicine?  Keep out of the reach of children.  Store at room temperature between 20 and 25 degrees C (68 to 77 degrees F). Throw away any unused medicine after the expiration date.  NOTE: This sheet is a summary. It may not cover all possible information. If you have questions about this medicine, talk to your doctor, pharmacist, or health care provider.  © 2019 [employer]vier/Gold Standard (2017-05-18 18:16:03)

## 2019-12-06 ENCOUNTER — TELEPHONE (OUTPATIENT)
Dept: FAMILY MEDICINE CLINIC | Facility: CLINIC | Age: 64
End: 2019-12-06

## 2019-12-06 LAB
AMPHET+METHAMPHET UR QL: NEGATIVE
AMPHETAMINES UR QL: NEGATIVE
BARBITURATES UR QL SCN: NEGATIVE
BENZODIAZ UR QL SCN: NEGATIVE
BUPRENORPHINE SERPL-MCNC: NEGATIVE NG/ML
CANNABINOIDS SERPL QL: NEGATIVE
COCAINE UR QL: NEGATIVE
METHADONE UR QL SCN: NEGATIVE
OPIATES UR QL: POSITIVE
OXYCODONE UR QL SCN: NEGATIVE
PCP UR QL SCN: NEGATIVE
PROPOXYPH UR QL: NEGATIVE
TRICYCLICS UR QL SCN: NEGATIVE

## 2019-12-06 RX ORDER — DULOXETIN HYDROCHLORIDE 30 MG/1
30 CAPSULE, DELAYED RELEASE ORAL 2 TIMES DAILY
Qty: 60 CAPSULE | Refills: 3 | Status: SHIPPED | OUTPATIENT
Start: 2019-12-06 | End: 2019-12-11 | Stop reason: DRUGHIGH

## 2019-12-06 NOTE — TELEPHONE ENCOUNTER
Pt stated that [facility] is requesting a prior auth for DULoxetine (CYMBALTA) 30 MG capsule.      [id number] - [facility] [address] - [phone]  - [phone]   [phone]

## 2019-12-09 ENCOUNTER — TELEPHONE (OUTPATIENT)
Dept: FAMILY MEDICINE CLINIC | Facility: CLINIC | Age: 64
End: 2019-12-09

## 2019-12-09 NOTE — TELEPHONE ENCOUNTER
Patient states that she received a call from [employer] stating that her insurance will not pay for the duloxetine.     States that due to the insurance that insurance is refusing to pay for medication.      Patient states that her insurance told her new prescription should be sent over to pharm so that prescription can be covered by insurance and filled.

## 2019-12-09 NOTE — TELEPHONE ENCOUNTER
Patient states that she received a call from [employer] stating that her insurance will not pay for the duloxetine.    States that due to the insurance that insurance is refusing to pay for medication.     Patient states that her insurance told her new prescription should be sent over to pharm so that prescription can be covered by insurance and filled.

## 2019-12-10 NOTE — TELEPHONE ENCOUNTER
Nathaly called to check on prior auth for duloxetine being sent to [facility] #[id number] - [place], [place] - [address] AT [address] - [phone]  - [phone] FX  [phone].    Pt requested a call at [phone] with an update.

## 2019-12-11 RX ORDER — DULOXETIN HYDROCHLORIDE 60 MG/1
60 CAPSULE, DELAYED RELEASE ORAL DAILY
Qty: 30 CAPSULE | Refills: 3 | Status: SHIPPED | OUTPATIENT
Start: 2019-12-11 | End: 2020-01-06

## 2019-12-11 NOTE — TELEPHONE ENCOUNTER
Nathaly called to check on prior auth for duloxetine being sent to [facility] #[id number] - [place], [place] - [address] - [phone] PH - [phone] FX  [phone].     Pt requested a call at [phone] with an update.         Documentation

## 2019-12-16 RX ORDER — LISINOPRIL AND HYDROCHLOROTHIAZIDE 25; 20 MG/1; MG/1
2 TABLET ORAL DAILY
Qty: 30 TABLET | Refills: 0 | Status: SHIPPED | OUTPATIENT
Start: 2019-12-16 | End: 2020-01-06

## 2019-12-16 RX ORDER — LISINOPRIL AND HYDROCHLOROTHIAZIDE 25; 20 MG/1; MG/1
2 TABLET ORAL DAILY
Qty: 30 TABLET | Refills: 0 | Status: SHIPPED | OUTPATIENT
Start: 2019-12-16 | End: 2020-01-20

## 2019-12-23 ENCOUNTER — TREATMENT (OUTPATIENT)
Dept: PHYSICAL THERAPY | Facility: CLINIC | Age: 64
End: 2019-12-23

## 2019-12-23 ENCOUNTER — LAB (OUTPATIENT)
Dept: LAB | Facility: HOSPITAL | Age: 64
End: 2019-12-23

## 2019-12-23 DIAGNOSIS — M54.42 CHRONIC LOW BACK PAIN WITH BILATERAL SCIATICA, UNSPECIFIED BACK PAIN LATERALITY: Primary | ICD-10-CM

## 2019-12-23 DIAGNOSIS — D50.9 IRON DEFICIENCY ANEMIA, UNSPECIFIED IRON DEFICIENCY ANEMIA TYPE: ICD-10-CM

## 2019-12-23 DIAGNOSIS — I10 ESSENTIAL HYPERTENSION: ICD-10-CM

## 2019-12-23 DIAGNOSIS — Z86.73 HISTORY OF CVA (CEREBROVASCULAR ACCIDENT): ICD-10-CM

## 2019-12-23 DIAGNOSIS — R26.81 GAIT INSTABILITY: ICD-10-CM

## 2019-12-23 DIAGNOSIS — M47.816 ARTHRITIS, LUMBAR SPINE: ICD-10-CM

## 2019-12-23 DIAGNOSIS — M54.41 CHRONIC LOW BACK PAIN WITH BILATERAL SCIATICA, UNSPECIFIED BACK PAIN LATERALITY: Primary | ICD-10-CM

## 2019-12-23 DIAGNOSIS — G89.29 CHRONIC LOW BACK PAIN WITH BILATERAL SCIATICA, UNSPECIFIED BACK PAIN LATERALITY: Primary | ICD-10-CM

## 2019-12-23 PROCEDURE — 80061 LIPID PANEL: CPT

## 2019-12-23 PROCEDURE — 84439 ASSAY OF FREE THYROXINE: CPT

## 2019-12-23 PROCEDURE — 84466 ASSAY OF TRANSFERRIN: CPT

## 2019-12-23 PROCEDURE — 83540 ASSAY OF IRON: CPT

## 2019-12-23 PROCEDURE — 84481 FREE ASSAY (FT-3): CPT

## 2019-12-23 PROCEDURE — 97162 PT EVAL MOD COMPLEX 30 MIN: CPT | Performed by: PHYSICAL THERAPIST

## 2019-12-23 PROCEDURE — 80053 COMPREHEN METABOLIC PANEL: CPT

## 2019-12-23 PROCEDURE — 82728 ASSAY OF FERRITIN: CPT

## 2019-12-23 PROCEDURE — 84443 ASSAY THYROID STIM HORMONE: CPT

## 2019-12-23 PROCEDURE — 82306 VITAMIN D 25 HYDROXY: CPT

## 2019-12-23 PROCEDURE — 85025 COMPLETE CBC W/AUTO DIFF WBC: CPT

## 2019-12-23 NOTE — PROGRESS NOTES
Physical Therapy Initial Evaluation and Plan of Care        Patient: Nathaly Licea   : 1955  Diagnosis/ICD-10 Code:  Chronic low back pain with bilateral sciatica, unspecified back pain laterality [M54.41, G89.29, M54.42]  Referring practitioner: Brown Lion MD  Date of Initial Visit: 2019  Today's Date: 2019  Patient seen for 1 sessions    Next MD appt: 2020.  Recertification: 2020           Subjective Questionnaire: Oswestry: 22/50 = 44%      Subjective Evaluation    History of Present Illness  Onset date: Chronic, years/ Stroke ~2 years ago.    Subjective comment: ppatient reports she has had years of LBP. She reports after brain surgery it improved some, but here lately the pain has bee increasing. She reports she feels like the R side is weak since the stroke.  Patient Occupation: Unemployed Quality of life: fair    Pain  Current pain ratin  At best pain ratin  At worst pain ratin  Location: Low back  Quality: dull ache, throbbing and sharp  Relieving factors: change in position, heat and medications  Aggravating factors: prolonged positioning and standing (mopping/sweeping)    Social Support  Lives in: multiple-level home  Lives with: adult children    Hand dominance: right    Diagnostic Tests  X-ray: abnormal (DDD and DJD)    Treatments  Previous treatment: medication, physical therapy and injection treatment (pain management)  Current treatment: medication  Patient Goals  Patient goals for therapy: increased strength and decreased pain             Objective       Static Posture     Head  Forward.    Shoulders  Rounded.    Thoracic Spine  Hyperkyphosis.    Lumbar Spine   Flattened.     Postural Observations  Seated posture: poor  Standing posture: fair        Tenderness     Additional Tenderness Details  No areas TTP.    Neurological Testing     Sensation     Lumbar   Left   Intact: light touch    Right   Intact: light touch    Reflexes   Left   Patellar  (L4): normal (2+)  Achilles (S1): normal (2+)    Right   Patellar (L4): normal (2+)  Achilles (S1): normal (2+)    Active Range of Motion     Lumbar   Flexion: 95 degrees   Extension: 25 degrees   Left lateral flexion: WFL  Right lateral flexion: WFL  Left rotation: WFL  Right rotation: WFL    Strength/Myotome Testing     Lumbar   Left   Normal strength    Right Hip   Planes of Motion   Flexion: 4+  Extension: 4+  Abduction: 4+  Adduction: 4+    Right Knee   Flexion: 4+  Extension: 4+    Right Ankle/Foot   Dorsiflexion: 5  Plantar flexion: 5    Additional Strength Details  Cog-wheeling with R hip MMT.    Tests     Lumbar     Left   Negative crossed SLR, passive SLR, quadrant and valsalva.     Right   Negative crossed SLR, passive SLR, quadrant and valsalva.     Left Pelvic Girdle/Sacrum   Negative: sacrum compression, gapping and sacral spring.     Right Pelvic Girdle/Sacrum   Negative: sacrum compression, gapping and sacral spring.     Left Hip   Negative JAVIER, FADIR, piriformis, SI compression and SI distraction.   SLR: Negative.     Right Hip   Negative JAVIER, FADIR, piriformis, SI compression and SI distraction.   SLR: Negative.     Additional Tests Details  Vanessa's Symptoms  1) Tenderness- negative  2) Simulation- positive  3) Distraction- positive  4) Regional- positive  5) Overreaction- positive    Ambulation   Weight-Bearing Status   Weight-Bearing Status (Left): weight-bearing as tolerated   Weight-Bearing Status (Right): weight-bearing as tolerated    Assistive device used: none    Ambulation: Level Surfaces   Ambulation without assistive device: independent    Ambulation: Stairs     Additional Stairs Ambulation Details  Not met.    Observational Gait   Gait: within functional limits   Stride length, left stance time, right stance time, left swing time, right swing time, left step length and right step length within functional limits. Decreased walking speed.   Left foot contact pattern: heel to  "toe  Right foot contact pattern: heel to toe  Left arm swing: within functional limits  Right arm swing: within functional limits  Base of support: normal    Additional Observational Gait Details  No significant gait deviation, slowed gait.    Functional Assessment     Comments  Poor log rol ltechnique, sits straight up with use of B UEs from supine.  TUG: no assistive device 18\", 19\", poor eccentric control stand to sit and B UE A sit to stand  x5 sit to/from stand: 27\", poor eccentric control stand to sit and B UE A sit to stand  30\" chair test: x5 reps, doesn't inniate past #5, poor eccentric control stand to sit and B UE A sit to stand  Rhomberg EO: 27\"  Rhomberg EC: 12\"  Sharpened Rhomberg EO: R 30\", L 27\"               Precautions and Contraindications   Precautions/Limitations fall precautions   Subjective Pain   Able to rate subjective pain? yes   Pre-Treatment Pain Level 5   Post-Treatment Pain Level 5   Home Living   Living Arrangements house   Home Accessibility not wheelchair accessible;stairs within home;stairs to enter home;tub/shower is not walk in   Vision-Basic Assessment   Current Vision No visual deficits   Cognition   Overall Cognitive Status WFL   Arousal/Alertness Appropriate responses to stimuli   Memory Appears intact   Orientation Level Oriented X4   Safety Judgment Good awareness of safety precautions   Deficits Fully aware of deficits   Sensation   Sensation WNL? WNL   Light Touch No apparent deficits   Sharp/Dull No apparent deficits   Perception   Perception WNL? WNL   Initiation Appears intact   Coordination   Coordination WNL? WNL   Coordination Tests Rapid Alternating;Finger to nose eyes open;Finger to nose eyes closed;Crossing midline   Rapid Alternating Bilteral:;Intact   Finger to Nose Eyes Open Bilteral:;Intact   Finger to Nose Eyes Closed Bilteral:;Intact   Crossing Midline Bilteral:;Intact   Tone   UE Tone WNL for age   LE Tone WNL for age   Trunk Tone WNL for age   Bed Mobility " Assessment/Treatment   Bed Mobility Assessment/Treatment rolling left;rolling right;scooting/bridging;supine-sit;sit-supine;supine-sit-supine   Rolling Left Fisher (Bed Mobility) independent   Rolling Right Fisher (Bed Mobility) independent   Scooting/Bridging Fisher (Bed Mobility) independent   Supine-Sit Fisher (Bed Mobility) independent   Sit-Supine Fisher (Bed Mobility) independent   Supine-Sit-Supine Fisher (Bed Mobility) independent   Transfers   Bed-Chair Fisher (Transfers) independent   Chair-Bed Fisher (Transfers) independent   Sit-Stand Fisher (Transfers) independent   Stand-Sit Fisher (Transfers) independent   Transfer, Impairments strength decreased;pain         Assessment & Plan     Assessment  Impairments: abnormal or restricted ROM, activity intolerance, impaired physical strength, lacks appropriate home exercise program and pain with function  Assessment details: Patient presents with chronic low back pain and a history of CVA with resulting minor weakness in the R LE. Patient also reports difficulty with balanbce and usually uses SC, but didn't bring it. She does not report ant falls in the last 2 years though. Patient's insurance requires authorization prior to treatment beginning. Small HEP was established.  Prognosis: fair  Prognosis details: Barriers to Rehab: Include significant or possible arthritic/degenerative changes that have occurred within the spine, The chronicity of this issue, The patient's obesity.    Safety Issues: Fall risk    Functional Limitations: carrying objects, lifting, sleeping, uncomfortable because of pain, sitting, standing and unable to perform repetitive tasks  Goals  Plan Goals: Short Term Goals:  1) I with HEP and have additions/changes by next recertification    2) Patient able to show proper log roll technique.    3) Patient to be more aware of posture and posture correction techniques    4) R QS/HS  "5/5.    5) TUG no assistive device <= 15 seconds.    6) Rhomberg EO >= 45 seconds.    7) Rhomberg EC >= 30 seconds    Long Term Goals:  1) Patient to have AROM for the lumbar spine all WNL, no increase in pain.    2) B LE 5/5.    3) Patient able to perform 20 Bridges with UE \"X\" with no increase in pain.    4) Patient able to show proper lifting technique floor to waist with no increase in pain.    5) Patient able to show proper ergonomics for mopping/sweeping/vacumming.    6) TUG no assistive device <= 12 seconds.    7) Rhomberg EO >= 60 seconds.    8) Sit to/from stand with 1 UE A x5 <= 20 seconds, good eccentric control.    9) I with final HEP.    10) D/C with a final HEP and free 30 day fitness formula membership.      Plan  Therapy options: will be seen for skilled physical therapy services  Planned modality interventions: cryotherapy, high voltage pulsed current (pain management) and thermotherapy (hydrocollator packs)  Other planned modality interventions: Patient defers aquatics.  Planned therapy interventions: abdominal trunk stabilization, body mechanics training, flexibility, functional ROM exercises, home exercise program, transfer training, therapeutic activities, stretching, strengthening, postural training and IADL retraining  Duration in visits: 20  Treatment plan discussed with: patient  Plan details: Progress ROM, strength, core stab, balance, gait, body mechanics to an I HEP that the patient can continue for long term pain management and spinal protection.         Visit Diagnoses:    ICD-10-CM ICD-9-CM   1. Chronic low back pain with bilateral sciatica, unspecified back pain laterality M54.41 724.2    G89.29 724.3    M54.42 338.29   2. Arthritis, lumbar spine M47.816 721.3   3. Gait instability R26.81 781.2   4. History of CVA (cerebrovascular accident) Z86.73 V12.54       Timed:  Manual Therapy:         mins  32576;  Therapeutic Exercise:         mins  48136;     Neuromuscular Monica:        mins  " 25545;    Therapeutic Activity:          mins  25653;     Gait Training:           mins  79900;     Ultrasound:          mins  69259;    Electrical Stimulation:         mins  83993 ( );    Untimed:  Electrical Stimulation:         mins  37254 ( );  Mechanical Traction:         mins  39492;     Timed Treatment:      mins   Total Treatment:     40   mins    PT SIGNATURE: Rhea Healy, PT DPT, CSCS   DATE TREATMENT INITIATED: 12/23/2019    Initial Certification  Certification Period: 3/22/2020  I certify that the therapy services are furnished while this patient is under my care.  The services outlined above are required by this patient, and will be reviewed every 90 days.     PHYSICIAN: Brown Lion MD      DATE:     Please sign and return via fax to  .. Thank you, [facility].

## 2019-12-24 LAB
25(OH)D3 SERPL-MCNC: 21.5 NG/ML (ref 30–100)
ALBUMIN SERPL-MCNC: 4.5 G/DL (ref 3.5–5.2)
ALBUMIN/GLOB SERPL: 1.3 G/DL
ALP SERPL-CCNC: 50 U/L (ref 39–117)
ALT SERPL W P-5'-P-CCNC: 14 U/L (ref 1–33)
ANION GAP SERPL CALCULATED.3IONS-SCNC: 12.4 MMOL/L (ref 5–15)
AST SERPL-CCNC: 12 U/L (ref 1–32)
BASOPHILS # BLD AUTO: 0.05 10*3/MM3 (ref 0–0.2)
BASOPHILS NFR BLD AUTO: 0.9 % (ref 0–1.5)
BILIRUB SERPL-MCNC: <0.2 MG/DL (ref 0.2–1.2)
BUN BLD-MCNC: 25 MG/DL (ref 8–23)
BUN/CREAT SERPL: 24 (ref 7–25)
CALCIUM SPEC-SCNC: 10.3 MG/DL (ref 8.6–10.5)
CHLORIDE SERPL-SCNC: 106 MMOL/L (ref 98–107)
CHOLEST SERPL-MCNC: 165 MG/DL (ref 0–200)
CO2 SERPL-SCNC: 24.6 MMOL/L (ref 22–29)
CREAT BLD-MCNC: 1.04 MG/DL (ref 0.57–1)
DEPRECATED RDW RBC AUTO: 50.5 FL (ref 37–54)
EOSINOPHIL # BLD AUTO: 0.2 10*3/MM3 (ref 0–0.4)
EOSINOPHIL NFR BLD AUTO: 3.4 % (ref 0.3–6.2)
ERYTHROCYTE [DISTWIDTH] IN BLOOD BY AUTOMATED COUNT: 17.6 % (ref 12.3–15.4)
FERRITIN SERPL-MCNC: 232 NG/ML (ref 13–150)
GFR SERPL CREATININE-BSD FRML MDRD: 65 ML/MIN/1.73
GLOBULIN UR ELPH-MCNC: 3.6 GM/DL
GLUCOSE BLD-MCNC: 98 MG/DL (ref 65–99)
HCT VFR BLD AUTO: 33.2 % (ref 34–46.6)
HDLC SERPL-MCNC: 62 MG/DL (ref 40–60)
HGB BLD-MCNC: 11.2 G/DL (ref 12–15.9)
IMM GRANULOCYTES # BLD AUTO: 0.02 10*3/MM3 (ref 0–0.05)
IMM GRANULOCYTES NFR BLD AUTO: 0.3 % (ref 0–0.5)
IRON 24H UR-MRATE: 70 MCG/DL (ref 37–145)
IRON SATN MFR SERPL: 22 % (ref 20–50)
LDLC SERPL CALC-MCNC: 69 MG/DL (ref 0–100)
LDLC/HDLC SERPL: 1.12 {RATIO}
LYMPHOCYTES # BLD AUTO: 2.27 10*3/MM3 (ref 0.7–3.1)
LYMPHOCYTES NFR BLD AUTO: 38.8 % (ref 19.6–45.3)
MCH RBC QN AUTO: 26.9 PG (ref 26.6–33)
MCHC RBC AUTO-ENTMCNC: 33.7 G/DL (ref 31.5–35.7)
MCV RBC AUTO: 79.8 FL (ref 79–97)
MONOCYTES # BLD AUTO: 0.49 10*3/MM3 (ref 0.1–0.9)
MONOCYTES NFR BLD AUTO: 8.4 % (ref 5–12)
NEUTROPHILS # BLD AUTO: 2.82 10*3/MM3 (ref 1.7–7)
NEUTROPHILS NFR BLD AUTO: 48.2 % (ref 42.7–76)
NRBC BLD AUTO-RTO: 0 /100 WBC (ref 0–0.2)
PLATELET # BLD AUTO: 247 10*3/MM3 (ref 140–450)
PMV BLD AUTO: 11.5 FL (ref 6–12)
POTASSIUM BLD-SCNC: 4.5 MMOL/L (ref 3.5–5.2)
PROT SERPL-MCNC: 8.1 G/DL (ref 6–8.5)
RBC # BLD AUTO: 4.16 10*6/MM3 (ref 3.77–5.28)
SODIUM BLD-SCNC: 143 MMOL/L (ref 136–145)
T3FREE SERPL-MCNC: 2.26 PG/ML (ref 2–4.4)
T4 FREE SERPL-MCNC: 1.25 NG/DL (ref 0.93–1.7)
TIBC SERPL-MCNC: 317 MCG/DL (ref 298–536)
TRANSFERRIN SERPL-MCNC: 213 MG/DL (ref 200–360)
TRIGL SERPL-MCNC: 168 MG/DL (ref 0–150)
TSH SERPL DL<=0.05 MIU/L-ACNC: 0.63 UIU/ML (ref 0.27–4.2)
VLDLC SERPL-MCNC: 33.6 MG/DL (ref 5–40)
WBC NRBC COR # BLD: 5.85 10*3/MM3 (ref 3.4–10.8)

## 2019-12-31 ENCOUNTER — TELEPHONE (OUTPATIENT)
Dept: FAMILY MEDICINE CLINIC | Facility: CLINIC | Age: 64
End: 2019-12-31

## 2019-12-31 NOTE — TELEPHONE ENCOUNTER
----- Message from Brown Lion MD sent at 12/26/2019 12:02 PM CST -----  Please call pt    Vitamin D is low and make sure pt is taking VItamin D3 50,000 units once a week     Thyroid studies normal on this lab check     On CMP. Creatinine slightly elevated with FR in 60s. PT has Chronic kidney disease stage 2.  Needs to drink more water. May need to see Nephrologist in future     On lipid panel HDL is at goal but triglcyerides high. Recommend pt start taking over the counter FIsh Oil supplement with omega 3, DHA and EPA. Also recommend heart healthy diet     Iron studies normal     On CBC hemoglobin stable. Will continue to monitor    Recheck on next visit . Thanks

## 2020-01-06 ENCOUNTER — OFFICE VISIT (OUTPATIENT)
Dept: FAMILY MEDICINE CLINIC | Facility: CLINIC | Age: 65
End: 2020-01-06

## 2020-01-06 ENCOUNTER — LAB (OUTPATIENT)
Dept: LAB | Facility: HOSPITAL | Age: 65
End: 2020-01-06

## 2020-01-06 VITALS
HEIGHT: 67 IN | OXYGEN SATURATION: 95 % | SYSTOLIC BLOOD PRESSURE: 132 MMHG | BODY MASS INDEX: 35.91 KG/M2 | WEIGHT: 228.8 LBS | DIASTOLIC BLOOD PRESSURE: 68 MMHG | TEMPERATURE: 98.2 F | HEART RATE: 83 BPM

## 2020-01-06 DIAGNOSIS — M54.42 CHRONIC LOW BACK PAIN WITH BILATERAL SCIATICA, UNSPECIFIED BACK PAIN LATERALITY: ICD-10-CM

## 2020-01-06 DIAGNOSIS — D50.9 IRON DEFICIENCY ANEMIA, UNSPECIFIED IRON DEFICIENCY ANEMIA TYPE: ICD-10-CM

## 2020-01-06 DIAGNOSIS — E78.5 HYPERLIPIDEMIA, UNSPECIFIED HYPERLIPIDEMIA TYPE: ICD-10-CM

## 2020-01-06 DIAGNOSIS — G89.29 CHRONIC LOW BACK PAIN WITH BILATERAL SCIATICA, UNSPECIFIED BACK PAIN LATERALITY: ICD-10-CM

## 2020-01-06 DIAGNOSIS — M54.41 CHRONIC LOW BACK PAIN WITH BILATERAL SCIATICA, UNSPECIFIED BACK PAIN LATERALITY: ICD-10-CM

## 2020-01-06 DIAGNOSIS — Z02.83 ENCOUNTER FOR DRUG SCREENING: ICD-10-CM

## 2020-01-06 DIAGNOSIS — R26.81 GAIT INSTABILITY: ICD-10-CM

## 2020-01-06 DIAGNOSIS — I63.9 CEREBROVASCULAR ACCIDENT (CVA), UNSPECIFIED MECHANISM (HCC): ICD-10-CM

## 2020-01-06 DIAGNOSIS — I10 ESSENTIAL HYPERTENSION: Primary | ICD-10-CM

## 2020-01-06 DIAGNOSIS — B18.2 CHRONIC HEPATITIS C WITHOUT HEPATIC COMA (HCC): ICD-10-CM

## 2020-01-06 DIAGNOSIS — M47.814 THORACIC ARTHRITIS: ICD-10-CM

## 2020-01-06 DIAGNOSIS — I82.622 DEEP VEIN THROMBOSIS (DVT) OF LEFT UPPER EXTREMITY, UNSPECIFIED CHRONICITY, UNSPECIFIED VEIN (HCC): ICD-10-CM

## 2020-01-06 DIAGNOSIS — E55.9 VITAMIN D DEFICIENCY: ICD-10-CM

## 2020-01-06 DIAGNOSIS — N18.30 STAGE 3 CHRONIC KIDNEY DISEASE (HCC): ICD-10-CM

## 2020-01-06 DIAGNOSIS — Z87.898 HISTORY OF BRAIN TUMOR: ICD-10-CM

## 2020-01-06 DIAGNOSIS — Z72.0 TOBACCO USER: ICD-10-CM

## 2020-01-06 PROCEDURE — 80306 DRUG TEST PRSMV INSTRMNT: CPT

## 2020-01-06 PROCEDURE — 99214 OFFICE O/P EST MOD 30 MIN: CPT | Performed by: FAMILY MEDICINE

## 2020-01-06 RX ORDER — TRAMADOL HYDROCHLORIDE 50 MG/1
50 TABLET ORAL EVERY 12 HOURS PRN
Qty: 60 TABLET | Refills: 0 | Status: SHIPPED | OUTPATIENT
Start: 2020-01-06 | End: 2020-02-13 | Stop reason: SDUPTHER

## 2020-01-07 RX ORDER — CYCLOBENZAPRINE HCL 5 MG
TABLET ORAL
Qty: 90 TABLET | Refills: 0 | Status: SHIPPED | OUTPATIENT
Start: 2020-01-07 | End: 2020-04-01 | Stop reason: SDUPTHER

## 2020-01-20 RX ORDER — LISINOPRIL AND HYDROCHLOROTHIAZIDE 25; 20 MG/1; MG/1
2 TABLET ORAL DAILY
Qty: 30 TABLET | Refills: 0 | Status: SHIPPED | OUTPATIENT
Start: 2020-01-20 | End: 2020-02-06 | Stop reason: SDUPTHER

## 2020-02-06 RX ORDER — LISINOPRIL AND HYDROCHLOROTHIAZIDE 25; 20 MG/1; MG/1
2 TABLET ORAL DAILY
Qty: 60 TABLET | Refills: 0 | Status: SHIPPED | OUTPATIENT
Start: 2020-02-06 | End: 2020-03-13 | Stop reason: SDUPTHER

## 2020-02-06 NOTE — TELEPHONE ENCOUNTER
I changed the amount to 60 since she takes 2 tablets. Please change if this is not how you would like for it to be sent. Also it doesn't have any refills. If you would like refills please add.

## 2020-02-13 ENCOUNTER — OFFICE VISIT (OUTPATIENT)
Dept: FAMILY MEDICINE CLINIC | Facility: CLINIC | Age: 65
End: 2020-02-13

## 2020-02-13 VITALS
OXYGEN SATURATION: 96 % | BODY MASS INDEX: 36.85 KG/M2 | DIASTOLIC BLOOD PRESSURE: 66 MMHG | WEIGHT: 234.8 LBS | SYSTOLIC BLOOD PRESSURE: 128 MMHG | HEART RATE: 77 BPM | HEIGHT: 67 IN | TEMPERATURE: 98.3 F

## 2020-02-13 DIAGNOSIS — R73.03 PREDIABETES: ICD-10-CM

## 2020-02-13 DIAGNOSIS — M54.42 CHRONIC LOW BACK PAIN WITH BILATERAL SCIATICA, UNSPECIFIED BACK PAIN LATERALITY: ICD-10-CM

## 2020-02-13 DIAGNOSIS — I10 ESSENTIAL HYPERTENSION: ICD-10-CM

## 2020-02-13 DIAGNOSIS — G47.00 INSOMNIA, UNSPECIFIED TYPE: ICD-10-CM

## 2020-02-13 DIAGNOSIS — N18.30 STAGE 3 CHRONIC KIDNEY DISEASE (HCC): ICD-10-CM

## 2020-02-13 DIAGNOSIS — I63.9 CEREBROVASCULAR ACCIDENT (CVA), UNSPECIFIED MECHANISM (HCC): ICD-10-CM

## 2020-02-13 DIAGNOSIS — I82.622 DEEP VEIN THROMBOSIS (DVT) OF LEFT UPPER EXTREMITY, UNSPECIFIED CHRONICITY, UNSPECIFIED VEIN (HCC): ICD-10-CM

## 2020-02-13 DIAGNOSIS — D50.9 IRON DEFICIENCY ANEMIA, UNSPECIFIED IRON DEFICIENCY ANEMIA TYPE: ICD-10-CM

## 2020-02-13 DIAGNOSIS — E78.5 HYPERLIPIDEMIA, UNSPECIFIED HYPERLIPIDEMIA TYPE: ICD-10-CM

## 2020-02-13 DIAGNOSIS — R26.81 GAIT INSTABILITY: ICD-10-CM

## 2020-02-13 DIAGNOSIS — B18.2 CHRONIC HEPATITIS C WITHOUT HEPATIC COMA (HCC): ICD-10-CM

## 2020-02-13 DIAGNOSIS — Z72.0 TOBACCO USER: Primary | ICD-10-CM

## 2020-02-13 DIAGNOSIS — M54.41 CHRONIC LOW BACK PAIN WITH BILATERAL SCIATICA, UNSPECIFIED BACK PAIN LATERALITY: ICD-10-CM

## 2020-02-13 DIAGNOSIS — E66.9 CLASS 2 OBESITY WITH BODY MASS INDEX (BMI) OF 36.0 TO 36.9 IN ADULT, UNSPECIFIED OBESITY TYPE, UNSPECIFIED WHETHER SERIOUS COMORBIDITY PRESENT: ICD-10-CM

## 2020-02-13 DIAGNOSIS — G81.91 RIGHT HEMIPARESIS (HCC): ICD-10-CM

## 2020-02-13 DIAGNOSIS — K21.9 GASTROESOPHAGEAL REFLUX DISEASE, ESOPHAGITIS PRESENCE NOT SPECIFIED: ICD-10-CM

## 2020-02-13 DIAGNOSIS — F32.A DEPRESSION, UNSPECIFIED DEPRESSION TYPE: ICD-10-CM

## 2020-02-13 DIAGNOSIS — R58 ECCHYMOSIS: ICD-10-CM

## 2020-02-13 DIAGNOSIS — Z87.898 HISTORY OF BRAIN TUMOR: ICD-10-CM

## 2020-02-13 DIAGNOSIS — G89.29 CHRONIC LOW BACK PAIN WITH BILATERAL SCIATICA, UNSPECIFIED BACK PAIN LATERALITY: ICD-10-CM

## 2020-02-13 DIAGNOSIS — Z86.73 HISTORY OF CVA (CEREBROVASCULAR ACCIDENT): ICD-10-CM

## 2020-02-13 DIAGNOSIS — E55.9 VITAMIN D DEFICIENCY: ICD-10-CM

## 2020-02-13 PROCEDURE — 99214 OFFICE O/P EST MOD 30 MIN: CPT | Performed by: FAMILY MEDICINE

## 2020-02-13 RX ORDER — DULOXETIN HYDROCHLORIDE 60 MG/1
60 CAPSULE, DELAYED RELEASE ORAL DAILY
COMMUNITY
Start: 2020-02-05 | End: 2020-06-17 | Stop reason: SINTOL

## 2020-02-13 RX ORDER — METOPROLOL SUCCINATE 25 MG/1
25 TABLET, EXTENDED RELEASE ORAL DAILY
Qty: 30 TABLET | Refills: 3 | Status: SHIPPED | OUTPATIENT
Start: 2020-02-13 | End: 2020-08-19 | Stop reason: SDUPTHER

## 2020-02-13 RX ORDER — TRAMADOL HYDROCHLORIDE 50 MG/1
50 TABLET ORAL EVERY 12 HOURS PRN
Qty: 60 TABLET | Refills: 0 | Status: SHIPPED | OUTPATIENT
Start: 2020-02-13 | End: 2020-03-12 | Stop reason: ALTCHOICE

## 2020-02-20 RX ORDER — AMLODIPINE BESYLATE 10 MG/1
10 TABLET ORAL DAILY
Qty: 30 TABLET | Refills: 3 | Status: SHIPPED | OUTPATIENT
Start: 2020-02-20 | End: 2020-07-17 | Stop reason: SDUPTHER

## 2020-03-03 ENCOUNTER — TELEPHONE (OUTPATIENT)
Dept: FAMILY MEDICINE CLINIC | Facility: CLINIC | Age: 65
End: 2020-03-03

## 2020-03-03 NOTE — TELEPHONE ENCOUNTER
Pt stated that [facility] requested a PA for her    traMADol (ULTRAM) 50 MG tablet       [id number], [place] - [address] - [phone]  - [phone] FX  [phone]    Patient can be reached at   [phone]

## 2020-03-06 NOTE — TELEPHONE ENCOUNTER
I am unable to get in touch with the pharmacy. The either do not answer or put you on hold and do not call back.

## 2020-03-06 NOTE — TELEPHONE ENCOUNTER
PT called to check the status of requested PA. States she still hasn't heard anything back from clinical staff and her insurance company. PT is very frustrated and would like an update ASAP.    Please call Nathaly with an update on PA status: [phone]

## 2020-03-06 NOTE — TELEPHONE ENCOUNTER
Pharmacy stated insurance is not willing to pay for opioids and that she could try tylenol or NSAID'S.

## 2020-03-06 NOTE — TELEPHONE ENCOUNTER
Spoke to pt and made aware it was denied by insurance and told her I would see if I could submit an appeal.

## 2020-03-06 NOTE — TELEPHONE ENCOUNTER
Please call pharmacy back. She cannot take NSAIDS due to risk of bleeding with taking plavix. Also  Tylenol does not help. Tramadol is alterative that works.

## 2020-03-06 NOTE — TELEPHONE ENCOUNTER
Pt called wants to speak with you about Tramadol sttes ins having issues with it pt wants to know if she paid for it out of pocket can she still get her script pt is fruistrated and states she needs her medicine please call patient

## 2020-03-12 ENCOUNTER — TELEPHONE (OUTPATIENT)
Dept: FAMILY MEDICINE CLINIC | Facility: CLINIC | Age: 65
End: 2020-03-12

## 2020-03-12 DIAGNOSIS — G89.29 CHRONIC LOW BACK PAIN WITH BILATERAL SCIATICA, UNSPECIFIED BACK PAIN LATERALITY: Primary | ICD-10-CM

## 2020-03-12 DIAGNOSIS — M54.42 CHRONIC LOW BACK PAIN WITH BILATERAL SCIATICA, UNSPECIFIED BACK PAIN LATERALITY: Primary | ICD-10-CM

## 2020-03-12 DIAGNOSIS — M54.41 CHRONIC LOW BACK PAIN WITH BILATERAL SCIATICA, UNSPECIFIED BACK PAIN LATERALITY: Primary | ICD-10-CM

## 2020-03-12 RX ORDER — ACETAMINOPHEN AND CODEINE PHOSPHATE 300; 30 MG/1; MG/1
1 TABLET ORAL EVERY 8 HOURS PRN
Qty: 90 TABLET | Refills: 0 | Status: SHIPPED | OUTPATIENT
Start: 2020-03-12 | End: 2020-04-23 | Stop reason: SDUPTHER

## 2020-03-13 RX ORDER — LISINOPRIL AND HYDROCHLOROTHIAZIDE 25; 20 MG/1; MG/1
2 TABLET ORAL DAILY
Qty: 60 TABLET | Refills: 0 | Status: SHIPPED | OUTPATIENT
Start: 2020-03-13 | End: 2020-04-16 | Stop reason: SDUPTHER

## 2020-03-26 RX ORDER — ERGOCALCIFEROL 1.25 MG/1
CAPSULE ORAL
Qty: 4 CAPSULE | Refills: 3 | Status: SHIPPED | OUTPATIENT
Start: 2020-03-26 | End: 2020-03-30 | Stop reason: SDUPTHER

## 2020-03-30 RX ORDER — CLOPIDOGREL BISULFATE 75 MG/1
TABLET ORAL
Qty: 30 TABLET | Refills: 5 | Status: SHIPPED | OUTPATIENT
Start: 2020-03-30 | End: 2020-03-30 | Stop reason: SDUPTHER

## 2020-03-30 RX ORDER — ERGOCALCIFEROL 1.25 MG/1
CAPSULE ORAL
Qty: 4 CAPSULE | Refills: 3 | Status: SHIPPED | OUTPATIENT
Start: 2020-03-30 | End: 2020-06-11

## 2020-03-30 RX ORDER — CLOPIDOGREL BISULFATE 75 MG/1
TABLET ORAL
Qty: 30 TABLET | Refills: 5 | Status: SHIPPED | OUTPATIENT
Start: 2020-03-30 | End: 2021-05-03 | Stop reason: SDUPTHER

## 2020-04-01 RX ORDER — CYCLOBENZAPRINE HCL 5 MG
5 TABLET ORAL 3 TIMES DAILY PRN
Qty: 90 TABLET | Refills: 2 | Status: SHIPPED | OUTPATIENT
Start: 2020-04-01 | End: 2020-07-22 | Stop reason: SDUPTHER

## 2020-04-16 RX ORDER — LISINOPRIL AND HYDROCHLOROTHIAZIDE 25; 20 MG/1; MG/1
2 TABLET ORAL DAILY
Qty: 60 TABLET | Refills: 3 | Status: SHIPPED | OUTPATIENT
Start: 2020-04-16 | End: 2020-04-23 | Stop reason: SDUPTHER

## 2020-04-22 NOTE — PROGRESS NOTES
Subjective:  Nathaly Licea is a 64 y.o. female who presents for       Patient Active Problem List   Diagnosis   • Encounter for screening for diabetes mellitus   • Encounter for screening mammogram for malignant neoplasm of breast   • Encounter for screening for malignant neoplasm of colon   • Encounter for gynecological examination   • History of brain tumor   • Encounter for screening for endocrine disorder   • Encounter for screening for cardiovascular disorders   • Tobacco user   • Need for hepatitis C screening test   • Tobacco abuse counseling   • Cerebrovascular accident (CVA) (CMS/HCC)   • Need for immunization against influenza   • Chronic low back pain with bilateral sciatica   • Chronic back pain   • Right hemiparesis (CMS/HCC)   • Slurring of speech   • Hyperlipidemia   • Insomnia   • Constipation   • Iron deficiency anemia   • Hepatitis C antibody positive in blood   • Gait instability   • History of CVA (cerebrovascular accident)   • Essential hypertension   • Depression   • Deep vein thrombosis (DVT) of left upper extremity (CMS/HCC)   • Need for hepatitis vaccination   • History of cardiac arrest   • Gastroesophageal reflux disease   • Obesity   • Chronic hepatitis C without hepatic coma (CMS/HCC)   • Encounter for Papanicolaou smear of cervix   • Anemia   • General medical examination   • Irritable bowel syndrome with constipation   • Prediabetes   • Vitamin D deficiency   • Encounter for screening colonoscopy   • Screening mammogram, encounter for   • Stage 3 chronic kidney disease (CMS/HCC)   • Thoracic arthritis   • Arthritis, lumbar spine   • Ecchymosis           Current Outpatient Medications:   •  acetaminophen-codeine (TYLENOL #3) 300-30 MG per tablet, Take 1 tablet by mouth Every 8 (Eight) Hours As Needed for Moderate Pain ., Disp: 90 tablet, Rfl: 0  •  amLODIPine (NORVASC) 10 MG tablet, TAKE 1 TABLET BY MOUTH DAILY, Disp: 30 tablet, Rfl: 3  •  aspirin  MG tablet, Take 1 tablet  by mouth Daily., Disp: 30 tablet, Rfl: 6  •  atorvastatin (LIPITOR) 80 MG tablet, Take 1 tablet by mouth Daily., Disp: 30 tablet, Rfl: 6  •  clopidogrel (PLAVIX) 75 MG tablet, TAKE 1 TABLET BY MOUTH DAILY, Disp: 30 tablet, Rfl: 5  •  cyclobenzaprine (FLEXERIL) 5 MG tablet, Take 1 tablet by mouth 3 (Three) Times a Day As Needed for Muscle Spasms. for muscle spams, Disp: 90 tablet, Rfl: 2  •  DULoxetine (CYMBALTA) 60 MG capsule, Take 60 mg by mouth Daily., Disp: , Rfl:   •  ferrous sulfate 325 (65 FE) MG tablet, Take 1 tablet by mouth Daily With Breakfast. Take with 6 oz oj or a vitamin c tablet, Disp: 30 tablet, Rfl: 6  •  linaclotide (LINZESS) 145 MCG capsule capsule, Take 1 capsule by mouth Every Other Day., Disp: 30 capsule, Rfl: 6  •  lisinopril-hydrochlorothiazide (PRINZIDE,ZESTORETIC) 20-25 MG per tablet, Take 2 tablets by mouth Daily., Disp: 60 tablet, Rfl: 3  •  metoprolol succinate XL (TOPROL XL) 25 MG 24 hr tablet, Take 1 tablet by mouth Daily., Disp: 30 tablet, Rfl: 3  •  nicotine polacrilex (NICORETTE) 4 MG gum, Chew 1 each As Needed for Smoking Cessation., Disp: 60 each, Rfl: 6  •  pantoprazole (PROTONIX) 40 MG EC tablet, Take 1 tablet by mouth Daily., Disp: 30 tablet, Rfl: 6  •  vitamin D (ERGOCALCIFEROL) 1.25 MG (51654 UT) capsule capsule, TAKE 1 CAPSULE BY MOUTH 1 TIME EVERY WEEK, Disp: 4 capsule, Rfl: 3    HPI        Pt is 62 yo female with management of obesity, IBS constipation, history of CVA, history of meningioma sp removal,  Tobacco user, gait instability, high fall risk GERD, vitamin D defiicency, on chronic anticoagulation, HTN, HLP, CKD stage 3, history of DVT in left upper extremity,  IBS-C, insomnia, history of hep C antibody positive in blood, history of cardiac arrest, prediabetes, iron deficiency anemia, subclinical hyperthryoidsim, history of cocaine use, benign thyroid nodules,  Degenerative changes of lumbar spine, or anterior wedging of T11 and T12      2/13/20 pt is here for  recheck and followup. Doing well. BP at goal no chest pain no dizziness. Back pain controlled with Tramadol pt has appt with Pain Management in March 2020.  She continues to smoke 5 cigarettes a day with nicotine gum.  BP at goal. Has colonoscopy with Dr. Loza last month and  Per patient it was normal does have some bruising on her arms. And is on aspirin and plavix     4/22/20 Telemedicine Visit.  Pt staying safe during pandemic and is staying at home..  BP stable at home stable on toprol xl 25 mg daily. lisionpril-HCTz 20-25 mg x 2 tablets daily.  norvasc 10 mgdaily.  She continues to smoke and is back up to 5 cigarettes a day.  She is not using nicotine gum due to cost.  She has yet to tried nicotine patch. She continues to use Tylenol #3 with codein for her back pain         Thyroid Problem   Presents for follow-up visit. Symptoms include anxiety, cold intolerance, depressed mood, fatigue, nail problem and palpitations. Patient reports no constipation, diaphoresis, diarrhea, dry skin, hair loss, heat intolerance, hoarse voice, leg swelling, menstrual problem, tremors, visual change, weight gain or weight loss. The symptoms have been stable.   Back Pain   This is a chronic problem. The current episode started more than 1 year ago. The problem occurs constantly. The problem is unchanged. The pain is present in the lumbar spine. The quality of the pain is described as aching. The pain does not radiate. The pain is at a severity of 5/10. The pain is moderate. The symptoms are aggravated by bending, lying down and position. Associated symptoms include headaches, numbness, paresis and weakness. Pertinent negatives include no abdominal pain, bladder incontinence, bowel incontinence, chest pain, dysuria, fever, leg pain, paresthesias, pelvic pain, perianal numbness, tingling or weight loss. She has tried nothing for the symptoms. The treatment provided no relief.   Cerebrovascular Accident   This is a new problem. The  current episode started more than 1 month ago. The problem occurs daily. The problem has been waxing and waning. Associated symptoms include fatigue. Pertinent negatives include no abdominal pain, anorexia, arthralgias, change in bowel habit, chest pain, chills, congestion, coughing, diaphoresis, fever, headaches, joint swelling, myalgias, nausea, neck pain, numbness, rash, sore throat, swollen glands, urinary symptoms, vertigo, visual change, vomiting or weakness. Nothing aggravates the symptoms. She has tried nothing for the symptoms. The treatment provided no relief.   Extremity Weakness    The pain is present in the right wrist, right hand, right fingers, right arm, right shoulder, right elbow, right upper leg, right knee, right lower leg, right ankle, right foot and right toes. This is a new problem. The current episode started more than 1 month ago. There has been no history of extremity trauma. The problem occurs daily. The problem has been waxing and waning. The pain is at a severity of 3/10. The pain is mild. Pertinent negatives include no fever, inability to bear weight, itching, joint locking, joint swelling, limited range of motion, numbness, stiffness or tingling. She has tried nothing for the symptoms. The treatment provided no relief. Family history does not include gout or rheumatoid arthritis. There is no history of diabetes, gout, osteoarthritis or rheumatoid arthritis.   Depression   Visit Type: initial  Onset of symptoms: at an unknown time  Progression since onset: gradually worsening  Patient presents with the following symptoms: anhedonia, decreased concentration, depressed mood and fatigue.  Patient is not experiencing: chest pain, choking sensation, compulsions, confusion, dizziness, dry mouth, excessive worry, feelings of hopelessness, feelings of worthlessness, hypersomnia, hyperventilation, impotence, insomnia, irritability, malaise, memory impairment, muscle  tension, nausea, nervousness/anxiety, obsessions, palpitations, panic, psychomotor agitation, psychomotor retardation, restlessness, shortness of breath, suicidal ideas, suicidal planning, thoughts of death, weight gain and weight loss.  Frequency of symptoms: constantly   Severity: moderate   Risk factors: stroke.  Patient has a history of: depression  No history of: anemia, anxiety/panic attacks, arrhythmia, asthma, bipolar disorder, CAD, CHF, chronic lung disease, fibromyalgia, hyperthyroidism, suicide attempt, mental illness and substance abuse  Treatment tried: nothing  Compliance with treatment: variable     Review of Systems  Review of Systems   Constitutional: Positive for activity change and fatigue. Negative for appetite change, chills, diaphoresis and fever.   HENT: Positive for voice change. Negative for congestion, postnasal drip, rhinorrhea, sinus pressure, sinus pain, sneezing, sore throat and trouble swallowing.    Respiratory: Negative for cough, choking, chest tightness, shortness of breath, wheezing and stridor.    Cardiovascular: Negative for chest pain.   Gastrointestinal: Negative for diarrhea, nausea and vomiting.   Musculoskeletal: Positive for arthralgias, back pain, gait problem, joint swelling and myalgias.   Neurological: Positive for weakness and numbness. Negative for headaches.   Psychiatric/Behavioral: Positive for agitation. The patient is nervous/anxious.         Depressed mood        Patient Active Problem List   Diagnosis   • Encounter for screening for diabetes mellitus   • Encounter for screening mammogram for malignant neoplasm of breast   • Encounter for screening for malignant neoplasm of colon   • Encounter for gynecological examination   • History of brain tumor   • Encounter for screening for endocrine disorder   • Encounter for screening for cardiovascular disorders   • Tobacco user   • Need for hepatitis C screening test   • Tobacco abuse counseling   • Cerebrovascular  accident (CVA) (CMS/HCC)   • Need for immunization against influenza   • Chronic low back pain with bilateral sciatica   • Chronic back pain   • Right hemiparesis (CMS/HCC)   • Slurring of speech   • Hyperlipidemia   • Insomnia   • Constipation   • Iron deficiency anemia   • Hepatitis C antibody positive in blood   • Gait instability   • History of CVA (cerebrovascular accident)   • Essential hypertension   • Depression   • Deep vein thrombosis (DVT) of left upper extremity (CMS/HCC)   • Need for hepatitis vaccination   • History of cardiac arrest   • Gastroesophageal reflux disease   • Obesity   • Chronic hepatitis C without hepatic coma (CMS/HCC)   • Encounter for Papanicolaou smear of cervix   • Anemia   • General medical examination   • Irritable bowel syndrome with constipation   • Prediabetes   • Vitamin D deficiency   • Encounter for screening colonoscopy   • Screening mammogram, encounter for   • Stage 3 chronic kidney disease (CMS/HCC)   • Thoracic arthritis   • Arthritis, lumbar spine   • Ecchymosis     Past Surgical History:   Procedure Laterality Date   • BRAIN SURGERY       Social History     Socioeconomic History   • Marital status: Legally      Spouse name: Not on file   • Number of children: Not on file   • Years of education: Not on file   • Highest education level: Not on file   Tobacco Use   • Smoking status: Current Every Day Smoker     Packs/day: 0.25     Types: Cigarettes   • Smokeless tobacco: Never Used   • Tobacco comment: 2-5 cigarettes a day   Substance and Sexual Activity   • Alcohol use: Yes     Comment: once a month, wine or beer   • Drug use: No   • Sexual activity: Yes     Partners: Male     Birth control/protection: None     Family History   Problem Relation Age of Onset   • Alcohol abuse Other    • Diabetes Other    • Heart disease Other    • Hypertension Other    • COPD Other    • Alcohol abuse Mother    • Arthritis Mother    • COPD Mother    • Heart disease Mother    •  Hypertension Mother    • Stroke Mother    • Diabetes Father    • Heart disease Father    • Diabetes Paternal Grandmother      Lab on 01/06/2020   Component Date Value Ref Range Status   • THC, Screen, Urine 01/06/2020 Negative  Negative Final   • Phencyclidine (PCP), Urine 01/06/2020 Negative  Negative Final   • Cocaine Screen, Urine 01/06/2020 Negative  Negative Final   • Methamphetamine, Ur 01/06/2020 Negative  Negative Final   • Opiate Screen 01/06/2020 Positive* Negative Final   • Amphetamine Screen, Urine 01/06/2020 Negative  Negative Final   • Benzodiazepine Screen, Urine 01/06/2020 Negative  Negative Final   • Tricyclic Antidepressants Screen 01/06/2020 Negative  Negative Final   • Methadone Screen, Urine 01/06/2020 Negative  Negative Final   • Barbiturates Screen, Urine 01/06/2020 Negative  Negative Final   • Oxycodone Screen, Urine 01/06/2020 Negative  Negative Final   • Propoxyphene Screen 01/06/2020 Negative  Negative Final   • Buprenorphine, Screen, Urine 01/06/2020 Negative  Negative Final   Lab on 12/23/2019   Component Date Value Ref Range Status   • WBC 12/23/2019 5.85  3.40 - 10.80 10*3/mm3 Final   • RBC 12/23/2019 4.16  3.77 - 5.28 10*6/mm3 Final   • Hemoglobin 12/23/2019 11.2* 12.0 - 15.9 g/dL Final   • Hematocrit 12/23/2019 33.2* 34.0 - 46.6 % Final   • MCV 12/23/2019 79.8  79.0 - 97.0 fL Final   • MCH 12/23/2019 26.9  26.6 - 33.0 pg Final   • MCHC 12/23/2019 33.7  31.5 - 35.7 g/dL Final   • RDW 12/23/2019 17.6* 12.3 - 15.4 % Final   • RDW-SD 12/23/2019 50.5  37.0 - 54.0 fl Final   • MPV 12/23/2019 11.5  6.0 - 12.0 fL Final   • Platelets 12/23/2019 247  140 - 450 10*3/mm3 Final   • Neutrophil % 12/23/2019 48.2  42.7 - 76.0 % Final   • Lymphocyte % 12/23/2019 38.8  19.6 - 45.3 % Final   • Monocyte % 12/23/2019 8.4  5.0 - 12.0 % Final   • Eosinophil % 12/23/2019 3.4  0.3 - 6.2 % Final   • Basophil % 12/23/2019 0.9  0.0 - 1.5 % Final   • Immature Grans % 12/23/2019 0.3  0.0 - 0.5 % Final   •  Neutrophils, Absolute 12/23/2019 2.82  1.70 - 7.00 10*3/mm3 Final   • Lymphocytes, Absolute 12/23/2019 2.27  0.70 - 3.10 10*3/mm3 Final   • Monocytes, Absolute 12/23/2019 0.49  0.10 - 0.90 10*3/mm3 Final   • Eosinophils, Absolute 12/23/2019 0.20  0.00 - 0.40 10*3/mm3 Final   • Basophils, Absolute 12/23/2019 0.05  0.00 - 0.20 10*3/mm3 Final   • Immature Grans, Absolute 12/23/2019 0.02  0.00 - 0.05 10*3/mm3 Final   • nRBC 12/23/2019 0.0  0.0 - 0.2 /100 WBC Final   • Glucose 12/23/2019 98  65 - 99 mg/dL Final   • BUN 12/23/2019 25* 8 - 23 mg/dL Final   • Creatinine 12/23/2019 1.04* 0.57 - 1.00 mg/dL Final   • Sodium 12/23/2019 143  136 - 145 mmol/L Final   • Potassium 12/23/2019 4.5  3.5 - 5.2 mmol/L Final   • Chloride 12/23/2019 106  98 - 107 mmol/L Final   • CO2 12/23/2019 24.6  22.0 - 29.0 mmol/L Final   • Calcium 12/23/2019 10.3  8.6 - 10.5 mg/dL Final   • Total Protein 12/23/2019 8.1  6.0 - 8.5 g/dL Final   • Albumin 12/23/2019 4.50  3.50 - 5.20 g/dL Final   • ALT (SGPT) 12/23/2019 14  1 - 33 U/L Final   • AST (SGOT) 12/23/2019 12  1 - 32 U/L Final   • Alkaline Phosphatase 12/23/2019 50  39 - 117 U/L Final   • Total Bilirubin 12/23/2019 <0.2* 0.2 - 1.2 mg/dL Final   • eGFR   Amer 12/23/2019 65  >60 mL/min/1.73 Final   • Globulin 12/23/2019 3.6  gm/dL Final   • A/G Ratio 12/23/2019 1.3  g/dL Final   • BUN/Creatinine Ratio 12/23/2019 24.0  7.0 - 25.0 Final   • Anion Gap 12/23/2019 12.4  5.0 - 15.0 mmol/L Final   • Total Cholesterol 12/23/2019 165  0 - 200 mg/dL Final   • Triglycerides 12/23/2019 168* 0 - 150 mg/dL Final   • HDL Cholesterol 12/23/2019 62* 40 - 60 mg/dL Final   • LDL Cholesterol  12/23/2019 69  0 - 100 mg/dL Final   • VLDL Cholesterol 12/23/2019 33.6  5 - 40 mg/dL Final   • LDL/HDL Ratio 12/23/2019 1.12   Final   • TSH 12/23/2019 0.626  0.270 - 4.200 uIU/mL Final   • Free T4 12/23/2019 1.25  0.93 - 1.70 ng/dL Final   • T3, Free 12/23/2019 2.26  2.00 - 4.40 pg/mL Final   • 25 Hydroxy, Vitamin D  12/23/2019 21.5* 30.0 - 100.0 ng/ml Final   • Iron 12/23/2019 70  37 - 145 mcg/dL Final   • Iron Saturation 12/23/2019 22  20 - 50 % Final   • Transferrin 12/23/2019 213  200 - 360 mg/dL Final   • TIBC 12/23/2019 317  298 - 536 mcg/dL Final   • Ferritin 12/23/2019 232.00* 13.00 - 150.00 ng/mL Final   Lab on 11/11/2019   Component Date Value Ref Range Status   • THC, Screen, Urine 12/05/2019 Negative  Negative Final   • Phencyclidine (PCP), Urine 12/05/2019 Negative  Negative Final   • Cocaine Screen, Urine 12/05/2019 Negative  Negative Final   • Methamphetamine, Ur 12/05/2019 Negative  Negative Final   • Opiate Screen 12/05/2019 Positive* Negative Final   • Amphetamine Screen, Urine 12/05/2019 Negative  Negative Final   • Benzodiazepine Screen, Urine 12/05/2019 Negative  Negative Final   • Tricyclic Antidepressants Screen 12/05/2019 Negative  Negative Final   • Methadone Screen, Urine 12/05/2019 Negative  Negative Final   • Barbiturates Screen, Urine 12/05/2019 Negative  Negative Final   • Oxycodone Screen, Urine 12/05/2019 Negative  Negative Final   • Propoxyphene Screen 12/05/2019 Negative  Negative Final   • Buprenorphine, Screen, Urine 12/05/2019 Negative  Negative Final      XR Spine Lumbar 4+ View  Narrative: PROCEDURE: XR SPINE LUMBAR COMPLETE 4+VW    VIEWS:   5      INDICATION:  Pain    COMPARISON:  None available at time of image interpretation    FINDINGS:       - Fracture(s): No definite acute fracture. Minimal anterior  wedging of T12 and T11    - Alignment:  Within normal limits      - Disc space heights: Severely narrowed at L5-S1 which was  noted on a prior report from 3/29/2010. Images are not available  to review at this time. There is also narrowing at L4-L5.    - Focal osteolytic/blastic: None      - Bone density:  Grossly within normal limits for age    - Misc.:  Degenerative facet changes.  Impression: 1. Very mild, age-indeterminate anterior wedging of T11 and T12,  favored to be nonacute, but  "no previous studies are available at  this time for comparison  2. Degenerative changes as above    If pain or symptoms persist beyond reasonable expectations,   an MRI examination is suggested as is deemed clinically  appropriate.    Electronically signed by:  Maryan Hutchison MD  11/11/2019 1:12 PM  [facility] Workstation: [id number]    [email or web address]@  Immunization History   Administered Date(s) Administered   • Flu Vaccine Quad PF >36MO 09/27/2017   • Flucelvax Quad Vial =>4yrs 10/31/2019   • Hepatitis A 12/15/2017   • Hepatitis B Vaccine Adult IM 12/15/2017, 01/26/2018       The following portions of the patient's history were reviewed and updated as appropriate: allergies, current medications, past family history, past medical history, past social history, past surgical history and problem list.        Physical Exam  Ht 170.2 cm (67\")   BMI 36.77 kg/m²     Physical Exam   Constitutional: She is oriented to person, place, and time. She appears well-developed and well-nourished.   HENT:   Head: Normocephalic and atraumatic.   Right Ear: External ear normal.   Eyes: Pupils are equal, round, and reactive to light. Conjunctivae and EOM are normal.   Neck: Normal range of motion. Neck supple.   Cardiovascular: Normal rate, regular rhythm and normal heart sounds.   No murmur heard.  Pulmonary/Chest: Effort normal and breath sounds normal. No respiratory distress.   Abdominal: Soft. Bowel sounds are normal. She exhibits no distension. There is no tenderness.   Musculoskeletal: Normal range of motion. She exhibits no edema or deformity.   Neurological: She is alert and oriented to person, place, and time. No cranial nerve deficit.   Skin: Skin is warm. No rash noted. She is not diaphoretic. No erythema. No pallor.   Psychiatric: She has a normal mood and affect. Her behavior is normal.   Nursing note and vitals reviewed.      Assessment/Plan    Diagnosis Plan   1. Tobacco user     2. Vitamin D deficiency     3. Stage 3 chronic " kidney disease (CMS/HCC)     4. Right hemiparesis (CMS/HCC)     5. Prediabetes     6. Class 2 obesity with body mass index (BMI) of 36.0 to 36.9 in adult, unspecified obesity type, unspecified whether serious comorbidity present     7. Iron deficiency anemia, unspecified iron deficiency anemia type     8. Hyperlipidemia, unspecified hyperlipidemia type     9. History of brain tumor     10. History of CVA (cerebrovascular accident)     11. Gastroesophageal reflux disease, esophagitis presence not specified     12. Gait instability     13. Essential hypertension     14. Chronic low back pain with bilateral sciatica, unspecified back pain laterality     15. Cerebrovascular accident (CVA), unspecified mechanism (CMS/HCC)     16. Arthritis, lumbar spine          -subclincal hyperthyroidism-pt has bilateral benign appearing nodules.  Will repeat thyroid studies Consider methimazole   -will get records from colonsoscopy from Dr. Loza  -for hypertension - BP  Not  at goal today. Continue on norvasc 10 mg PO q daily .She is to continue lisinopril-HCTZ 20-25 mg PO daily. Start on toprol XL 25 mg daily.    -chronic back pain xray of lower back pt  Recommend PT/OT  went over x-ray of lumbar spine   Will do short term coarse of tramadol 50 mg every 8 hours PRN.  For 5 days. Consider long term coarse. Pending UDS. Will refer to pain management. Pt cannot take NSAIDS DARYA printed and on file. UDS today   -chronic hep C - continue to monitor   - will reschedule mammogram screening at imaging center  -for pap smear - referral to [facility] Consultation appreciated  - iron deficiency anemia - pt on iron supplements. Recommend  Possible Hematology referral for possible iron IV therapy   -colonoscopy screening with Dr. Loza. soon. I adivsed pt call Neurologist Dr. Parham if okay to stop aspirin plavix before colonscopy   -ecchmyosis  Likely due to plavix. .   -GERD - continue  on protonix 40 mg daily. Gave food choices for  GERD to go home with . Gave drug information to go home with. Pt may need EGD in near future  - tobacco user- counseled pt to stop smoking. Counseled >5 minutes she did not like chantix.[email or web address] resend nicotine patch to pharmacy   - for history of CVA /gait instability/slurring of speech/history of brain tumor - pt is seeing Neurologist in Neurologist next month. Clinically stable   - for hyperlipidemia - lipitor 80 mg PO qhs recheck lipid panel   - depression - stable. Pt is off celexa now  - for constipation/IBS  - better with linzess but will go up from 72 to 144 mcg every other day. Samples provided today.   -obesity -  counseled weight loss >5 miinutes  BMI at 35.84  -advised pt to be safe and call with questions and concerns  -advised to go to ER or call 911 if symptoms worrisome or severe  -advised to followup with Specialist and referrals  -advised pt to be safe during COVID-19 pandemic  This visit has been rescheduled as a phone visit to comply with patient safety concerns in accordance with CDC recommendations. Total time of discussion was 25  Minutes.  -recheck in 1 month         This document has been electronically signed by Brown Lion MD on April 23, 2020 07:43

## 2020-04-23 ENCOUNTER — OFFICE VISIT (OUTPATIENT)
Dept: FAMILY MEDICINE CLINIC | Facility: CLINIC | Age: 65
End: 2020-04-23

## 2020-04-23 VITALS — HEIGHT: 67 IN | BODY MASS INDEX: 36.77 KG/M2

## 2020-04-23 DIAGNOSIS — M54.42 CHRONIC LOW BACK PAIN WITH BILATERAL SCIATICA, UNSPECIFIED BACK PAIN LATERALITY: ICD-10-CM

## 2020-04-23 DIAGNOSIS — G89.29 CHRONIC LOW BACK PAIN WITH BILATERAL SCIATICA, UNSPECIFIED BACK PAIN LATERALITY: ICD-10-CM

## 2020-04-23 DIAGNOSIS — I10 ESSENTIAL HYPERTENSION: ICD-10-CM

## 2020-04-23 DIAGNOSIS — R73.03 PREDIABETES: ICD-10-CM

## 2020-04-23 DIAGNOSIS — D50.9 IRON DEFICIENCY ANEMIA, UNSPECIFIED IRON DEFICIENCY ANEMIA TYPE: ICD-10-CM

## 2020-04-23 DIAGNOSIS — K21.9 GASTROESOPHAGEAL REFLUX DISEASE, ESOPHAGITIS PRESENCE NOT SPECIFIED: ICD-10-CM

## 2020-04-23 DIAGNOSIS — G81.91 RIGHT HEMIPARESIS (HCC): ICD-10-CM

## 2020-04-23 DIAGNOSIS — Z86.73 HISTORY OF CVA (CEREBROVASCULAR ACCIDENT): ICD-10-CM

## 2020-04-23 DIAGNOSIS — M54.41 CHRONIC LOW BACK PAIN WITH BILATERAL SCIATICA, UNSPECIFIED BACK PAIN LATERALITY: ICD-10-CM

## 2020-04-23 DIAGNOSIS — E78.5 HYPERLIPIDEMIA, UNSPECIFIED HYPERLIPIDEMIA TYPE: ICD-10-CM

## 2020-04-23 DIAGNOSIS — Z87.898 HISTORY OF BRAIN TUMOR: ICD-10-CM

## 2020-04-23 DIAGNOSIS — N18.30 STAGE 3 CHRONIC KIDNEY DISEASE (HCC): ICD-10-CM

## 2020-04-23 DIAGNOSIS — Z72.0 TOBACCO USER: Primary | ICD-10-CM

## 2020-04-23 DIAGNOSIS — R26.81 GAIT INSTABILITY: ICD-10-CM

## 2020-04-23 DIAGNOSIS — E66.9 CLASS 2 OBESITY WITH BODY MASS INDEX (BMI) OF 36.0 TO 36.9 IN ADULT, UNSPECIFIED OBESITY TYPE, UNSPECIFIED WHETHER SERIOUS COMORBIDITY PRESENT: ICD-10-CM

## 2020-04-23 DIAGNOSIS — M47.816 ARTHRITIS, LUMBAR SPINE: ICD-10-CM

## 2020-04-23 DIAGNOSIS — E55.9 VITAMIN D DEFICIENCY: ICD-10-CM

## 2020-04-23 DIAGNOSIS — I63.9 CEREBROVASCULAR ACCIDENT (CVA), UNSPECIFIED MECHANISM (HCC): ICD-10-CM

## 2020-04-23 PROCEDURE — 99213 OFFICE O/P EST LOW 20 MIN: CPT | Performed by: FAMILY MEDICINE

## 2020-04-23 RX ORDER — NICOTINE 21 MG/24HR
1 PATCH, TRANSDERMAL 24 HOURS TRANSDERMAL EVERY 24 HOURS
Qty: 28 PATCH | Refills: 3 | Status: SHIPPED | OUTPATIENT
Start: 2020-04-23 | End: 2021-02-08 | Stop reason: SDUPTHER

## 2020-04-23 RX ORDER — ACETAMINOPHEN AND CODEINE PHOSPHATE 300; 30 MG/1; MG/1
1 TABLET ORAL EVERY 8 HOURS PRN
Qty: 90 TABLET | Refills: 0 | Status: SHIPPED | OUTPATIENT
Start: 2020-04-23 | End: 2020-06-17

## 2020-04-23 RX ORDER — ACETAMINOPHEN AND CODEINE PHOSPHATE 300; 30 MG/1; MG/1
1 TABLET ORAL EVERY 8 HOURS PRN
Qty: 90 TABLET | Refills: 0 | Status: CANCELLED | OUTPATIENT
Start: 2020-04-23

## 2020-04-23 RX ORDER — LISINOPRIL AND HYDROCHLOROTHIAZIDE 25; 20 MG/1; MG/1
2 TABLET ORAL DAILY
Qty: 60 TABLET | Refills: 3 | Status: SHIPPED | OUTPATIENT
Start: 2020-04-23 | End: 2020-10-06 | Stop reason: SDUPTHER

## 2020-04-23 RX ORDER — CYCLOBENZAPRINE HCL 5 MG
5 TABLET ORAL 3 TIMES DAILY PRN
Qty: 90 TABLET | Refills: 2 | OUTPATIENT
Start: 2020-04-23

## 2020-04-23 RX ORDER — LISINOPRIL AND HYDROCHLOROTHIAZIDE 25; 20 MG/1; MG/1
2 TABLET ORAL DAILY
Qty: 60 TABLET | Refills: 3 | Status: CANCELLED | OUTPATIENT
Start: 2020-04-23

## 2020-04-23 NOTE — TELEPHONE ENCOUNTER
PT called to request refills on the following medications:  · acetaminophen-codeine (TYLENOL #3) 300-30 MG per tablet    · cyclobenzaprine (FLEXERIL) 5 MG tablet    · lisinopril-hydrochlorothiazide (PRINZIDE,ZESTORETIC) 20-25 MG per tablet    PT states she was supposed to have a telemedicine encounter this morning, but no one contacted her. Advised PT that clinic reached out for registration and her VM was full. Spoke with Shalini in clinic, advised PT to wait for Dr. Lion's call today. PT stated understanding and that she'll keep her phone with her this morning.     Confirmed Pharmacy:   [id number] - [place], [place] - [address] AT [address] - [phone]  - [phone] FX  [address] [address]  Phone: [phone] Fax: [phone]

## 2020-06-11 RX ORDER — ERGOCALCIFEROL 1.25 MG/1
CAPSULE ORAL
Qty: 4 CAPSULE | Refills: 3 | Status: SHIPPED | OUTPATIENT
Start: 2020-06-11 | End: 2021-12-23 | Stop reason: SDUPTHER

## 2020-06-11 NOTE — PROGRESS NOTES
Subjective:  Nathaly Licea is a 64 y.o. female who presents for       Patient Active Problem List   Diagnosis   • Encounter for screening for diabetes mellitus   • Encounter for screening mammogram for malignant neoplasm of breast   • Encounter for screening for malignant neoplasm of colon   • Encounter for gynecological examination   • History of brain tumor   • Encounter for screening for endocrine disorder   • Encounter for screening for cardiovascular disorders   • Tobacco user   • Need for hepatitis C screening test   • Tobacco abuse counseling   • Cerebrovascular accident (CVA) (CMS/HCC)   • Need for immunization against influenza   • Chronic low back pain with bilateral sciatica   • Chronic back pain   • Right hemiparesis (CMS/HCC)   • Slurring of speech   • Hyperlipidemia   • Insomnia   • Constipation   • Iron deficiency anemia   • Hepatitis C antibody positive in blood   • Gait instability   • History of CVA (cerebrovascular accident)   • Essential hypertension   • Depression   • Deep vein thrombosis (DVT) of left upper extremity (CMS/HCC)   • Need for hepatitis vaccination   • History of cardiac arrest   • Gastroesophageal reflux disease   • Obesity   • Chronic hepatitis C without hepatic coma (CMS/HCC)   • Encounter for Papanicolaou smear of cervix   • Anemia   • General medical examination   • Irritable bowel syndrome with constipation   • Prediabetes   • Vitamin D deficiency   • Encounter for screening colonoscopy   • Screening mammogram, encounter for   • Stage 3 chronic kidney disease (CMS/HCC)   • Thoracic arthritis   • Arthritis, lumbar spine   • Ecchymosis   • Class 2 severe obesity with serious comorbidity and body mass index (BMI) of 37.0 to 37.9 in adult (CMS/HCC)   • Chronic bilateral low back pain with bilateral sciatica           Current Outpatient Medications:   •  amLODIPine (NORVASC) 10 MG tablet, TAKE 1 TABLET BY MOUTH DAILY, Disp: 30 tablet, Rfl: 3  •  aspirin  MG tablet,  Take 1 tablet by mouth Daily., Disp: 30 tablet, Rfl: 6  •  atorvastatin (LIPITOR) 80 MG tablet, Take 1 tablet by mouth Daily., Disp: 30 tablet, Rfl: 6  •  clopidogrel (PLAVIX) 75 MG tablet, TAKE 1 TABLET BY MOUTH DAILY, Disp: 30 tablet, Rfl: 5  •  cyclobenzaprine (FLEXERIL) 5 MG tablet, Take 1 tablet by mouth 3 (Three) Times a Day As Needed for Muscle Spasms. for muscle spams, Disp: 90 tablet, Rfl: 2  •  ferrous sulfate 325 (65 FE) MG tablet, Take 1 tablet by mouth Daily With Breakfast. Take with 6 oz oj or a vitamin c tablet, Disp: 30 tablet, Rfl: 6  •  linaclotide (LINZESS) 145 MCG capsule capsule, Take 1 capsule by mouth Every Other Day., Disp: 30 capsule, Rfl: 6  •  lisinopril-hydrochlorothiazide (PRINZIDE,ZESTORETIC) 20-25 MG per tablet, Take 2 tablets by mouth Daily., Disp: 60 tablet, Rfl: 3  •  metoprolol succinate XL (TOPROL XL) 25 MG 24 hr tablet, Take 1 tablet by mouth Daily., Disp: 30 tablet, Rfl: 3  •  pantoprazole (PROTONIX) 40 MG EC tablet, Take 1 tablet by mouth Daily., Disp: 30 tablet, Rfl: 6  •  traMADol (ULTRAM) 50 MG tablet, TK 1 T PO Q 12 H PRF MODERATE PAIN OR SEVERE PAIN, Disp: , Rfl:   •  vitamin D (ERGOCALCIFEROL) 1.25 MG (90952 UT) capsule capsule, TAKE 1 CAPSULE BY MOUTH 1 TIME EVERY WEEK, Disp: 4 capsule, Rfl: 3  •  nicotine (Nicotine Step 1) 21 MG/24HR patch, Place 1 patch on the skin as directed by provider Daily., Disp: 28 patch, Rfl: 3    Pt is 64 yo female with management of obesity, IBS constipation, history of CVA, history of meningioma sp removal,  Tobacco user, gait instability, high fall risk GERD, vitamin D defiicency, on chronic anticoagulation, HTN, HLP, CKD stage 3, history of DVT in left upper extremity,  IBS-C, insomnia, history of hep C antibody positive in blood, history of cardiac arrest, prediabetes, iron deficiency anemia, subclinical hyperthryoidsim, history of cocaine use, benign thyroid nodules,  Degenerative changes of lumbar spine, or anterior wedging of T11 and  T12         4/22/20 Telemedicine Visit.  Pt staying safe during pandemic and is staying at home..  BP stable at home stable on toprol xl 25 mg daily. lisionpril-HCTz 20-25 mg x 2 tablets daily.  norvasc 10 mgdaily.  She continues to smoke and is back up to 5 cigarettes a day.  She is not using nicotine gum due to cost.  She has yet to tried nicotine patch. She continues to use Tylenol #3 with codein for her back pain     6/17/20 in office visit for recheck on pt's history of obesity tobacco use, history of CVA, HTN, HLP, CKD stage 3, iron deficiency anemia,, chronic lower back pain. Pt has yet to get labwork. She is feeling good overall. She has cut back on smoking BP is stable. She still has lower back pain.  She has appt with Pain Management  In [place] in August. Tramadol does help with back pain .she is requesting refill. No urinary symptoms. No pain on urination no bowel issus.     Hypertension   This is a chronic problem. The current episode started more than 1 year ago. The problem is unchanged. The problem is uncontrolled. Pertinent negatives include no anxiety, blurred vision, chest pain, headaches, malaise/fatigue, neck pain, orthopnea, palpitations or shortness of breath. Risk factors for coronary artery disease include dyslipidemia, obesity, sedentary lifestyle and smoking/tobacco exposure. Past treatments include ACE inhibitors, diuretics and beta blockers. Current antihypertension treatment includes diuretics and beta blockers. The current treatment provides moderate improvement. Compliance problems include psychosocial issues.  There is no history of angina, kidney disease, CAD/MI, CVA, heart failure, left ventricular hypertrophy, PVD or retinopathy. There is no history of chronic renal disease, coarctation of the aorta, hyperaldosteronism, hypercortisolism, hyperparathyroidism, a hypertension causing med, pheochromocytoma, renovascular disease, sleep apnea or a thyroid problem.   Thyroid  Problem   Presents for follow-up visit. Symptoms include anxiety, cold intolerance, depressed mood, fatigue, nail problem and palpitations. Patient reports no constipation, diaphoresis, diarrhea, dry skin, hair loss, heat intolerance, hoarse voice, leg swelling, menstrual problem, tremors, visual change, weight gain or weight loss. The symptoms have been stable.   Back Pain   This is a chronic problem. The current episode started more than 1 year ago. The problem occurs constantly. The problem is unchanged. The pain is present in the lumbar spine. The quality of the pain is described as aching. The pain does not radiate. The pain is at a severity of 5/10. The pain is moderate. The symptoms are aggravated by bending, lying down and position. Associated symptoms include headaches, numbness, paresis and weakness. Pertinent negatives include no abdominal pain, bladder incontinence, bowel incontinence, chest pain, dysuria, fever, leg pain, paresthesias, pelvic pain, perianal numbness, tingling or weight loss. She has tried nothing for the symptoms. The treatment provided no relief.   Cerebrovascular Accident   This is a chronic  problem. The current episode started more than 1 month ago. The problem occurs daily. The problem has been waxing and waning. Associated symptoms include fatigue. Pertinent negatives include no abdominal pain, anorexia, arthralgias, change in bowel habit, chest pain, chills, congestion, coughing, diaphoresis, fever, headaches, joint swelling, myalgias, nausea, neck pain, numbness, rash, sore throat, swollen glands, urinary symptoms, vertigo, visual change, vomiting or weakness. Nothing aggravates the symptoms. She has tried nothing for the symptoms. The treatment provided no relief.   Depression   Visit Type: recurrent   Onset of symptoms: at an unknown time  Progression since onset: gradually worsening  Patient presents with the following symptoms: anhedonia, decreased concentration, depressed  mood and fatigue.  Patient is not experiencing: chest pain, choking sensation, compulsions, confusion, dizziness, dry mouth, excessive worry, feelings of hopelessness, feelings of worthlessness, hypersomnia, hyperventilation, impotence, insomnia, irritability, malaise, memory impairment, muscle tension, nausea, nervousness/anxiety, obsessions, palpitations, panic, psychomotor agitation, psychomotor retardation, restlessness, shortness of breath, suicidal ideas, suicidal planning, thoughts of death, weight gain and weight loss.  Frequency of symptoms: constantly   Severity: moderate   Risk factors: stroke.  Patient has a history of: depression  No history of: anemia, anxiety/panic attacks, arrhythmia, asthma, bipolar disorder, CAD, CHF, chronic lung disease, fibromyalgia, hyperthyroidism, suicide attempt, mental illness and substance abuse  Treatment tried: nothing  Compliance with treatment: variable         Review of Systems  Review of Systems   Constitutional: Positive for activity change and fatigue. Negative for appetite change, chills, diaphoresis, fever and malaise/fatigue.   HENT: Negative for congestion, postnasal drip, rhinorrhea, sinus pressure, sinus pain, sneezing, sore throat, trouble swallowing and voice change.    Eyes: Negative for blurred vision.   Respiratory: Negative for cough, choking, chest tightness, shortness of breath, wheezing and stridor.    Cardiovascular: Negative for chest pain, palpitations and orthopnea.   Gastrointestinal: Negative for diarrhea, nausea and vomiting.   Musculoskeletal: Positive for arthralgias, back pain, gait problem and myalgias. Negative for neck pain.   Neurological: Positive for weakness and numbness. Negative for headaches.   Psychiatric/Behavioral: Positive for decreased concentration. The patient is nervous/anxious.         Depressed mood        Patient Active Problem List   Diagnosis   • Encounter for screening for diabetes mellitus   • Encounter for  screening mammogram for malignant neoplasm of breast   • Encounter for screening for malignant neoplasm of colon   • Encounter for gynecological examination   • History of brain tumor   • Encounter for screening for endocrine disorder   • Encounter for screening for cardiovascular disorders   • Tobacco user   • Need for hepatitis C screening test   • Tobacco abuse counseling   • Cerebrovascular accident (CVA) (CMS/HCC)   • Need for immunization against influenza   • Chronic low back pain with bilateral sciatica   • Chronic back pain   • Right hemiparesis (CMS/HCC)   • Slurring of speech   • Hyperlipidemia   • Insomnia   • Constipation   • Iron deficiency anemia   • Hepatitis C antibody positive in blood   • Gait instability   • History of CVA (cerebrovascular accident)   • Essential hypertension   • Depression   • Deep vein thrombosis (DVT) of left upper extremity (CMS/HCC)   • Need for hepatitis vaccination   • History of cardiac arrest   • Gastroesophageal reflux disease   • Obesity   • Chronic hepatitis C without hepatic coma (CMS/HCC)   • Encounter for Papanicolaou smear of cervix   • Anemia   • General medical examination   • Irritable bowel syndrome with constipation   • Prediabetes   • Vitamin D deficiency   • Encounter for screening colonoscopy   • Screening mammogram, encounter for   • Stage 3 chronic kidney disease (CMS/HCC)   • Thoracic arthritis   • Arthritis, lumbar spine   • Ecchymosis   • Class 2 severe obesity with serious comorbidity and body mass index (BMI) of 37.0 to 37.9 in adult (CMS/HCC)   • Chronic bilateral low back pain with bilateral sciatica     Past Surgical History:   Procedure Laterality Date   • BRAIN SURGERY       Social History     Socioeconomic History   • Marital status: Legally      Spouse name: Not on file   • Number of children: Not on file   • Years of education: Not on file   • Highest education level: Not on file   Tobacco Use   • Smoking status: Current Every Day  Smoker     Packs/day: 0.25     Types: Cigarettes   • Smokeless tobacco: Never Used   • Tobacco comment: 2-5 cigarettes a day   Substance and Sexual Activity   • Alcohol use: Yes     Comment: once a month, wine or beer   • Drug use: No   • Sexual activity: Yes     Partners: Male     Birth control/protection: None     Family History   Problem Relation Age of Onset   • Alcohol abuse Other    • Diabetes Other    • Heart disease Other    • Hypertension Other    • COPD Other    • Alcohol abuse Mother    • Arthritis Mother    • COPD Mother    • Heart disease Mother    • Hypertension Mother    • Stroke Mother    • Diabetes Father    • Heart disease Father    • Diabetes Paternal Grandmother      Lab on 01/06/2020   Component Date Value Ref Range Status   • THC, Screen, Urine 01/06/2020 Negative  Negative Final   • Phencyclidine (PCP), Urine 01/06/2020 Negative  Negative Final   • Cocaine Screen, Urine 01/06/2020 Negative  Negative Final   • Methamphetamine, Ur 01/06/2020 Negative  Negative Final   • Opiate Screen 01/06/2020 Positive* Negative Final   • Amphetamine Screen, Urine 01/06/2020 Negative  Negative Final   • Benzodiazepine Screen, Urine 01/06/2020 Negative  Negative Final   • Tricyclic Antidepressants Screen 01/06/2020 Negative  Negative Final   • Methadone Screen, Urine 01/06/2020 Negative  Negative Final   • Barbiturates Screen, Urine 01/06/2020 Negative  Negative Final   • Oxycodone Screen, Urine 01/06/2020 Negative  Negative Final   • Propoxyphene Screen 01/06/2020 Negative  Negative Final   • Buprenorphine, Screen, Urine 01/06/2020 Negative  Negative Final   Lab on 12/23/2019   Component Date Value Ref Range Status   • WBC 12/23/2019 5.85  3.40 - 10.80 10*3/mm3 Final   • RBC 12/23/2019 4.16  3.77 - 5.28 10*6/mm3 Final   • Hemoglobin 12/23/2019 11.2* 12.0 - 15.9 g/dL Final   • Hematocrit 12/23/2019 33.2* 34.0 - 46.6 % Final   • MCV 12/23/2019 79.8  79.0 - 97.0 fL Final   • MCH 12/23/2019 26.9  26.6 - 33.0 pg  Final   • MCHC 12/23/2019 33.7  31.5 - 35.7 g/dL Final   • RDW 12/23/2019 17.6* 12.3 - 15.4 % Final   • RDW-SD 12/23/2019 50.5  37.0 - 54.0 fl Final   • MPV 12/23/2019 11.5  6.0 - 12.0 fL Final   • Platelets 12/23/2019 247  140 - 450 10*3/mm3 Final   • Neutrophil % 12/23/2019 48.2  42.7 - 76.0 % Final   • Lymphocyte % 12/23/2019 38.8  19.6 - 45.3 % Final   • Monocyte % 12/23/2019 8.4  5.0 - 12.0 % Final   • Eosinophil % 12/23/2019 3.4  0.3 - 6.2 % Final   • Basophil % 12/23/2019 0.9  0.0 - 1.5 % Final   • Immature Grans % 12/23/2019 0.3  0.0 - 0.5 % Final   • Neutrophils, Absolute 12/23/2019 2.82  1.70 - 7.00 10*3/mm3 Final   • Lymphocytes, Absolute 12/23/2019 2.27  0.70 - 3.10 10*3/mm3 Final   • Monocytes, Absolute 12/23/2019 0.49  0.10 - 0.90 10*3/mm3 Final   • Eosinophils, Absolute 12/23/2019 0.20  0.00 - 0.40 10*3/mm3 Final   • Basophils, Absolute 12/23/2019 0.05  0.00 - 0.20 10*3/mm3 Final   • Immature Grans, Absolute 12/23/2019 0.02  0.00 - 0.05 10*3/mm3 Final   • nRBC 12/23/2019 0.0  0.0 - 0.2 /100 WBC Final   • Glucose 12/23/2019 98  65 - 99 mg/dL Final   • BUN 12/23/2019 25* 8 - 23 mg/dL Final   • Creatinine 12/23/2019 1.04* 0.57 - 1.00 mg/dL Final   • Sodium 12/23/2019 143  136 - 145 mmol/L Final   • Potassium 12/23/2019 4.5  3.5 - 5.2 mmol/L Final   • Chloride 12/23/2019 106  98 - 107 mmol/L Final   • CO2 12/23/2019 24.6  22.0 - 29.0 mmol/L Final   • Calcium 12/23/2019 10.3  8.6 - 10.5 mg/dL Final   • Total Protein 12/23/2019 8.1  6.0 - 8.5 g/dL Final   • Albumin 12/23/2019 4.50  3.50 - 5.20 g/dL Final   • ALT (SGPT) 12/23/2019 14  1 - 33 U/L Final   • AST (SGOT) 12/23/2019 12  1 - 32 U/L Final   • Alkaline Phosphatase 12/23/2019 50  39 - 117 U/L Final   • Total Bilirubin 12/23/2019 <0.2* 0.2 - 1.2 mg/dL Final   • eGFR   Amer 12/23/2019 65  >60 mL/min/1.73 Final   • Globulin 12/23/2019 3.6  gm/dL Final   • A/G Ratio 12/23/2019 1.3  g/dL Final   • BUN/Creatinine Ratio 12/23/2019 24.0  7.0 - 25.0  Final   • Anion Gap 12/23/2019 12.4  5.0 - 15.0 mmol/L Final   • Total Cholesterol 12/23/2019 165  0 - 200 mg/dL Final   • Triglycerides 12/23/2019 168* 0 - 150 mg/dL Final   • HDL Cholesterol 12/23/2019 62* 40 - 60 mg/dL Final   • LDL Cholesterol  12/23/2019 69  0 - 100 mg/dL Final   • VLDL Cholesterol 12/23/2019 33.6  5 - 40 mg/dL Final   • LDL/HDL Ratio 12/23/2019 1.12   Final   • TSH 12/23/2019 0.626  0.270 - 4.200 uIU/mL Final   • Free T4 12/23/2019 1.25  0.93 - 1.70 ng/dL Final   • T3, Free 12/23/2019 2.26  2.00 - 4.40 pg/mL Final   • 25 Hydroxy, Vitamin D 12/23/2019 21.5* 30.0 - 100.0 ng/ml Final   • Iron 12/23/2019 70  37 - 145 mcg/dL Final   • Iron Saturation 12/23/2019 22  20 - 50 % Final   • Transferrin 12/23/2019 213  200 - 360 mg/dL Final   • TIBC 12/23/2019 317  298 - 536 mcg/dL Final   • Ferritin 12/23/2019 232.00* 13.00 - 150.00 ng/mL Final      XR Spine Lumbar 4+ View  Narrative: PROCEDURE: XR SPINE LUMBAR COMPLETE 4+VW    VIEWS:   5      INDICATION:  Pain    COMPARISON:  None available at time of image interpretation    FINDINGS:       - Fracture(s): No definite acute fracture. Minimal anterior  wedging of T12 and T11    - Alignment:  Within normal limits      - Disc space heights: Severely narrowed at L5-S1 which was  noted on a prior report from 3/29/2010. Images are not available  to review at this time. There is also narrowing at L4-L5.    - Focal osteolytic/blastic: None      - Bone density:  Grossly within normal limits for age    - Misc.:  Degenerative facet changes.  Impression: 1. Very mild, age-indeterminate anterior wedging of T11 and T12,  favored to be nonacute, but no previous studies are available at  this time for comparison  2. Degenerative changes as above    If pain or symptoms persist beyond reasonable expectations,   an MRI examination is suggested as is deemed clinically  appropriate.    Electronically signed by:  Maryan Hutchison MD  11/11/2019 1:12 PM  CST Workstation:  "[id number]    [email or web address]  Immunization History   Administered Date(s) Administered   • Flu Vaccine Quad PF >36MO 09/27/2017   • Flucelvax Quad Vial =>4yrs 10/31/2019   • Hepatitis A 12/15/2017   • Hepatitis B Vaccine Adult IM 12/15/2017, 01/26/2018       The following portions of the patient's history were reviewed and updated as appropriate: allergies, current medications, past family history, past medical history, past social history, past surgical history and problem list.        Physical Exam  /58 (BP Location: Left arm, Patient Position: Sitting, Cuff Size: Large Adult)   Pulse 68   Temp 98.4 °F (36.9 °C) (Temporal)   Ht 170.2 cm (67\")   Wt 108 kg (237 lb 12.8 oz)   SpO2 97%   BMI 37.24 kg/m²     Physical Exam   Constitutional: She is oriented to person, place, and time. She appears well-developed and well-nourished.   HENT:   Head: Normocephalic and atraumatic.   Right Ear: External ear normal.   Eyes: Pupils are equal, round, and reactive to light. Conjunctivae and EOM are normal.   Neck: Normal range of motion. Neck supple.   Cardiovascular: Normal rate, regular rhythm and normal heart sounds.   No murmur heard.  Pulmonary/Chest: Effort normal and breath sounds normal. No respiratory distress.   Abdominal: Soft. Bowel sounds are normal. She exhibits no distension. There is no tenderness.   Obese abdomen    Musculoskeletal: She exhibits tenderness. She exhibits no edema or deformity.        Lumbar back: She exhibits decreased range of motion, tenderness, bony tenderness, pain and spasm.   Get up and go test >10 seconds    Gait instability    Neurological: She is alert and oriented to person, place, and time. No cranial nerve deficit.   Skin: Skin is warm. No rash noted. She is not diaphoretic. No erythema. No pallor.   Psychiatric: She has a normal mood and affect. Her behavior is normal.   Nursing note and vitals reviewed.      Assessment/Plan    Diagnosis Plan   1. Chronic hepatitis C without " hepatic coma (CMS/HCC)  Hepatitis C antibody    Hepatitis C RNA, Quantitative, PCR (graph)   2. Vitamin D deficiency     3. Tobacco user     4. Stage 3 chronic kidney disease (CMS/HCC)     5. Prediabetes     6. Class 2 obesity with body mass index (BMI) of 36.0 to 36.9 in adult, unspecified obesity type, unspecified whether serious comorbidity present     7. Iron deficiency anemia, unspecified iron deficiency anemia type     8. Class 2 severe obesity with serious comorbidity and body mass index (BMI) of 37.0 to 37.9 in adult, unspecified obesity type (CMS/HCC)     9. Chronic bilateral low back pain with bilateral sciatica           -recommend pt get labwork  -recommend colonsocopy   -vitamin D deficiency - vitamin D once a week  -subclincal hyperthyroidism- last thyroid test stable pt has bilateral benign appearing nodules.  Will repeat thyroid studies Consider methimazole   -will get records from colonsoscopy from Dr. Loza  -for hypertension -at goal on norvasc 10 mg daily. lisionpril-HCTZ 20/12.5 mg daily. toprol XL 25 mgdaily.    -chronic back pain/mild degnerative changes of lumbar spine - recommend MRI.  Referred to pain management.  Was on cymbalta 60 mg PO q dailyi. Has been on tylenol PRN.  Continue on tramadol 50 mg every 8 hours PRN. DARYA printed and on file ref number [id number].  Will get UDS.   -gait instability/high fall risk - pt uses cane to ambulate   -chronic hep C - check hep C antibody and hep C RNA  - will reschedule mammogram screening at imaging center  -for pap smear - referral to [facility] Consultation appreciated  - iron deficiency anemia - pt on iron supplements. Recommend  Possible Hematology referral for possible iron IV therapy .   -GERD - continue  on protonix 40 mg daily. Gave food choices for GERD to go home with . Gave drug information to go home with. Pt may need EGD in near future  - tobacco user- counseled pt to stop smoking. Counseled >5 minutes she did not like  [email or web address] resend nicotine patch to pharmacy   - for history of CVA /gait instability/slurring of speech/history of brain tumor -Neurology following currently stable  On aspirin 81 mg daily, on plavix 72 mg daily.   - for hyperlipidemia - lon80 mg PO qhs recheck lipid panel   - depression - stable. Pt is off celexa now  - for constipation/IBS  - on linzess 145 mcg daily.    -obesity -  counseled weight loss >5 miinutes  BMI at 37.2   -advised pt to be safe and call with questions and concerns  -advised to go to ER or call 911 if symptoms worrisome or severe  -advised to followup with Specialist and referrals  -advised pt to be safe during COVID-19 pandemic  -total time with pt >25 minutes   .      This document has been electronically signed by Brown Lion MD on June 17, 2020 16:09

## 2020-06-17 ENCOUNTER — OFFICE VISIT (OUTPATIENT)
Dept: FAMILY MEDICINE CLINIC | Facility: CLINIC | Age: 65
End: 2020-06-17

## 2020-06-17 VITALS
HEIGHT: 67 IN | HEART RATE: 68 BPM | TEMPERATURE: 98.4 F | WEIGHT: 237.8 LBS | BODY MASS INDEX: 37.32 KG/M2 | SYSTOLIC BLOOD PRESSURE: 112 MMHG | DIASTOLIC BLOOD PRESSURE: 58 MMHG | OXYGEN SATURATION: 97 %

## 2020-06-17 DIAGNOSIS — M54.42 CHRONIC BILATERAL LOW BACK PAIN WITH BILATERAL SCIATICA: ICD-10-CM

## 2020-06-17 DIAGNOSIS — R73.03 PREDIABETES: ICD-10-CM

## 2020-06-17 DIAGNOSIS — G89.29 CHRONIC BILATERAL LOW BACK PAIN WITH BILATERAL SCIATICA: ICD-10-CM

## 2020-06-17 DIAGNOSIS — E66.9 CLASS 2 OBESITY WITH BODY MASS INDEX (BMI) OF 36.0 TO 36.9 IN ADULT, UNSPECIFIED OBESITY TYPE, UNSPECIFIED WHETHER SERIOUS COMORBIDITY PRESENT: ICD-10-CM

## 2020-06-17 DIAGNOSIS — E66.01 CLASS 2 SEVERE OBESITY WITH SERIOUS COMORBIDITY AND BODY MASS INDEX (BMI) OF 37.0 TO 37.9 IN ADULT, UNSPECIFIED OBESITY TYPE (HCC): ICD-10-CM

## 2020-06-17 DIAGNOSIS — B18.2 CHRONIC HEPATITIS C WITHOUT HEPATIC COMA (HCC): ICD-10-CM

## 2020-06-17 DIAGNOSIS — N18.30 STAGE 3 CHRONIC KIDNEY DISEASE (HCC): ICD-10-CM

## 2020-06-17 DIAGNOSIS — Z72.0 TOBACCO USER: ICD-10-CM

## 2020-06-17 DIAGNOSIS — D50.9 IRON DEFICIENCY ANEMIA, UNSPECIFIED IRON DEFICIENCY ANEMIA TYPE: ICD-10-CM

## 2020-06-17 DIAGNOSIS — Z02.83 ENCOUNTER FOR DRUG SCREENING: Primary | ICD-10-CM

## 2020-06-17 DIAGNOSIS — M54.41 CHRONIC BILATERAL LOW BACK PAIN WITH BILATERAL SCIATICA: ICD-10-CM

## 2020-06-17 DIAGNOSIS — E55.9 VITAMIN D DEFICIENCY: ICD-10-CM

## 2020-06-17 PROCEDURE — 99214 OFFICE O/P EST MOD 30 MIN: CPT | Performed by: FAMILY MEDICINE

## 2020-06-17 RX ORDER — TRAMADOL HYDROCHLORIDE 50 MG/1
TABLET ORAL
COMMUNITY
Start: 2020-04-25 | End: 2020-06-17 | Stop reason: SDUPTHER

## 2020-06-17 RX ORDER — TRAMADOL HYDROCHLORIDE 50 MG/1
50 TABLET ORAL EVERY 8 HOURS PRN
Qty: 90 TABLET | Refills: 0 | Status: SHIPPED | OUTPATIENT
Start: 2020-06-17 | End: 2020-08-12 | Stop reason: SDUPTHER

## 2020-06-19 ENCOUNTER — LAB (OUTPATIENT)
Dept: LAB | Facility: HOSPITAL | Age: 65
End: 2020-06-19

## 2020-06-19 DIAGNOSIS — Z02.83 ENCOUNTER FOR DRUG SCREENING: ICD-10-CM

## 2020-06-19 DIAGNOSIS — D50.9 IRON DEFICIENCY ANEMIA, UNSPECIFIED IRON DEFICIENCY ANEMIA TYPE: ICD-10-CM

## 2020-06-19 DIAGNOSIS — E66.9 CLASS 2 OBESITY WITH BODY MASS INDEX (BMI) OF 36.0 TO 36.9 IN ADULT, UNSPECIFIED OBESITY TYPE, UNSPECIFIED WHETHER SERIOUS COMORBIDITY PRESENT: ICD-10-CM

## 2020-06-19 DIAGNOSIS — Z86.73 HISTORY OF CVA (CEREBROVASCULAR ACCIDENT): ICD-10-CM

## 2020-06-19 DIAGNOSIS — N18.30 STAGE 3 CHRONIC KIDNEY DISEASE (HCC): ICD-10-CM

## 2020-06-19 DIAGNOSIS — I10 ESSENTIAL HYPERTENSION: ICD-10-CM

## 2020-06-19 DIAGNOSIS — B18.2 CHRONIC HEPATITIS C WITHOUT HEPATIC COMA (HCC): ICD-10-CM

## 2020-06-19 DIAGNOSIS — E78.5 HYPERLIPIDEMIA, UNSPECIFIED HYPERLIPIDEMIA TYPE: ICD-10-CM

## 2020-06-19 PROCEDURE — 82728 ASSAY OF FERRITIN: CPT

## 2020-06-19 PROCEDURE — 80053 COMPREHEN METABOLIC PANEL: CPT

## 2020-06-19 PROCEDURE — 82306 VITAMIN D 25 HYDROXY: CPT

## 2020-06-19 PROCEDURE — 80074 ACUTE HEPATITIS PANEL: CPT

## 2020-06-19 PROCEDURE — 80307 DRUG TEST PRSMV CHEM ANLYZR: CPT

## 2020-06-19 PROCEDURE — 80061 LIPID PANEL: CPT

## 2020-06-19 PROCEDURE — 84466 ASSAY OF TRANSFERRIN: CPT

## 2020-06-19 PROCEDURE — 83540 ASSAY OF IRON: CPT

## 2020-06-19 PROCEDURE — 87522 HEPATITIS C REVRS TRNSCRPJ: CPT

## 2020-06-19 PROCEDURE — 83036 HEMOGLOBIN GLYCOSYLATED A1C: CPT

## 2020-06-19 PROCEDURE — 85025 COMPLETE CBC W/AUTO DIFF WBC: CPT

## 2020-06-20 LAB
25(OH)D3 SERPL-MCNC: 32 NG/ML (ref 30–100)
ALBUMIN SERPL-MCNC: 4 G/DL (ref 3.5–5.2)
ALBUMIN/GLOB SERPL: 1 G/DL
ALP SERPL-CCNC: 48 U/L (ref 39–117)
ALT SERPL W P-5'-P-CCNC: 14 U/L (ref 1–33)
ANION GAP SERPL CALCULATED.3IONS-SCNC: 11.5 MMOL/L (ref 5–15)
AST SERPL-CCNC: 16 U/L (ref 1–32)
BASOPHILS # BLD AUTO: 0.03 10*3/MM3 (ref 0–0.2)
BASOPHILS NFR BLD AUTO: 0.6 % (ref 0–1.5)
BILIRUB SERPL-MCNC: 0.2 MG/DL (ref 0.2–1.2)
BUN BLD-MCNC: 21 MG/DL (ref 8–23)
BUN/CREAT SERPL: 21.2 (ref 7–25)
CALCIUM SPEC-SCNC: 9.9 MG/DL (ref 8.6–10.5)
CHLORIDE SERPL-SCNC: 98 MMOL/L (ref 98–107)
CHOLEST SERPL-MCNC: 156 MG/DL (ref 0–200)
CO2 SERPL-SCNC: 24.5 MMOL/L (ref 22–29)
CREAT BLD-MCNC: 0.99 MG/DL (ref 0.57–1)
DEPRECATED RDW RBC AUTO: 54.3 FL (ref 37–54)
EOSINOPHIL # BLD AUTO: 0.26 10*3/MM3 (ref 0–0.4)
EOSINOPHIL NFR BLD AUTO: 5.3 % (ref 0.3–6.2)
ERYTHROCYTE [DISTWIDTH] IN BLOOD BY AUTOMATED COUNT: 17.9 % (ref 12.3–15.4)
FERRITIN SERPL-MCNC: 277 NG/ML (ref 13–150)
GFR SERPL CREATININE-BSD FRML MDRD: 69 ML/MIN/1.73
GLOBULIN UR ELPH-MCNC: 3.9 GM/DL
GLUCOSE BLD-MCNC: 89 MG/DL (ref 65–99)
HAV IGM SERPL QL IA: ABNORMAL
HBA1C MFR BLD: 5.35 % (ref 4.8–5.6)
HBV CORE IGM SERPL QL IA: ABNORMAL
HBV SURFACE AG SERPL QL IA: ABNORMAL
HCT VFR BLD AUTO: 32 % (ref 34–46.6)
HCV AB SER DONR QL: REACTIVE
HDLC SERPL-MCNC: 60 MG/DL (ref 40–60)
HGB BLD-MCNC: 10.8 G/DL (ref 12–15.9)
HOLD SPECIMEN: NORMAL
IMM GRANULOCYTES # BLD AUTO: 0.01 10*3/MM3 (ref 0–0.05)
IMM GRANULOCYTES NFR BLD AUTO: 0.2 % (ref 0–0.5)
IRON 24H UR-MRATE: 71 MCG/DL (ref 37–145)
IRON SATN MFR SERPL: 21 % (ref 20–50)
LDLC SERPL CALC-MCNC: 71 MG/DL (ref 0–100)
LDLC/HDLC SERPL: 1.19 {RATIO}
LYMPHOCYTES # BLD AUTO: 1.92 10*3/MM3 (ref 0.7–3.1)
LYMPHOCYTES NFR BLD AUTO: 39.3 % (ref 19.6–45.3)
MCH RBC QN AUTO: 28.1 PG (ref 26.6–33)
MCHC RBC AUTO-ENTMCNC: 33.8 G/DL (ref 31.5–35.7)
MCV RBC AUTO: 83.1 FL (ref 79–97)
MONOCYTES # BLD AUTO: 0.38 10*3/MM3 (ref 0.1–0.9)
MONOCYTES NFR BLD AUTO: 7.8 % (ref 5–12)
NEUTROPHILS # BLD AUTO: 2.29 10*3/MM3 (ref 1.7–7)
NEUTROPHILS NFR BLD AUTO: 46.8 % (ref 42.7–76)
NRBC BLD AUTO-RTO: 0 /100 WBC (ref 0–0.2)
PLATELET # BLD AUTO: 223 10*3/MM3 (ref 140–450)
PMV BLD AUTO: 11.3 FL (ref 6–12)
POTASSIUM BLD-SCNC: 4 MMOL/L (ref 3.5–5.2)
PROT SERPL-MCNC: 7.9 G/DL (ref 6–8.5)
RBC # BLD AUTO: 3.85 10*6/MM3 (ref 3.77–5.28)
SODIUM BLD-SCNC: 134 MMOL/L (ref 136–145)
TIBC SERPL-MCNC: 332 MCG/DL (ref 298–536)
TRANSFERRIN SERPL-MCNC: 223 MG/DL (ref 200–360)
TRIGL SERPL-MCNC: 124 MG/DL (ref 0–150)
VLDLC SERPL-MCNC: 24.8 MG/DL (ref 5–40)
WBC NRBC COR # BLD: 4.89 10*3/MM3 (ref 3.4–10.8)

## 2020-06-22 LAB — TRAMADOL UR: NEGATIVE NG/ML

## 2020-06-24 LAB
HCV RNA SERPL NAA+PROBE-ACNC: NORMAL IU/ML
TEST INFORMATION: NORMAL

## 2020-07-01 ENCOUNTER — TELEPHONE (OUTPATIENT)
Dept: FAMILY MEDICINE CLINIC | Facility: CLINIC | Age: 65
End: 2020-07-01

## 2020-07-01 NOTE — TELEPHONE ENCOUNTER
Gave pt results and recommendations. Pt voiced understanding. Pt stated she can not remember when she last took Tramadol.

## 2020-07-01 NOTE — TELEPHONE ENCOUNTER
----- Message from Brown Lion MD sent at 6/26/2020  1:20 PM CDT -----  Please call pt    All labs stable except her hemoglobin slightly low from last check will continue to monitor. Continue with iron supplementation    Pt does have Hepatitis C but it is chronic and not active. Her hepatitis C RNA test was negative     On cMP kidney function stable but sodium slightly low will monitor    Recheck on next visit. Thanks

## 2020-07-01 NOTE — TELEPHONE ENCOUNTER
----- Message from Brown Lion MD sent at 6/22/2020 12:12 PM CDT -----  Urine tramadol negative. Ask when pt took tramadol. She may need a drug information and please call lab at [facility] if they can run one. Thanks

## 2020-07-01 NOTE — TELEPHONE ENCOUNTER
----- Message from Brown Lion MD sent at 6/22/2020 12:12 PM CDT -----  Urine tramadol negative. Ask when pt took tramadol. She may need a drug information and please call lab at [facility] if they can run one. Thanks

## 2020-07-16 RX ORDER — FERROUS SULFATE 325(65) MG
TABLET ORAL
Qty: 30 TABLET | Refills: 6 | Status: SHIPPED | OUTPATIENT
Start: 2020-07-16 | End: 2021-05-04 | Stop reason: SDUPTHER

## 2020-07-17 ENCOUNTER — TELEPHONE (OUTPATIENT)
Dept: FAMILY MEDICINE CLINIC | Facility: CLINIC | Age: 65
End: 2020-07-17

## 2020-07-17 RX ORDER — AMLODIPINE BESYLATE 10 MG/1
10 TABLET ORAL DAILY
Qty: 30 TABLET | Refills: 3 | Status: SHIPPED | OUTPATIENT
Start: 2020-07-17 | End: 2020-12-14 | Stop reason: SDUPTHER

## 2020-07-17 NOTE — TELEPHONE ENCOUNTER
PT called, states she spoke with her pharmacist regarding the following medication:  traMADol (ULTRAM) 50 MG tablet. States she was told that her insurance carrier had denied to cover the med and a PA would need to be completed.     Please advise/call Nathaly back: [phone].    Confirmed Pharmacy:   [id number] [place], [place] - [address] [place] [address] AT SEC OF [facility] [address] - [phone]  - [phone]   [address] [place] [id number]  Phone: [phone] Fax: [phone]

## 2020-07-20 DIAGNOSIS — M54.42 CHRONIC BILATERAL LOW BACK PAIN WITH BILATERAL SCIATICA: Primary | ICD-10-CM

## 2020-07-20 DIAGNOSIS — M54.41 CHRONIC BILATERAL LOW BACK PAIN WITH BILATERAL SCIATICA: Primary | ICD-10-CM

## 2020-07-20 DIAGNOSIS — G89.29 CHRONIC BILATERAL LOW BACK PAIN WITH BILATERAL SCIATICA: Primary | ICD-10-CM

## 2020-07-20 RX ORDER — HYDROCODONE BITARTRATE AND ACETAMINOPHEN 5; 325 MG/1; MG/1
1 TABLET ORAL EVERY 4 HOURS PRN
Qty: 12 TABLET | Refills: 0 | Status: SHIPPED | OUTPATIENT
Start: 2020-07-20 | End: 2020-07-23

## 2020-07-20 NOTE — TELEPHONE ENCOUNTER
Patient called in checking the status of the PA that was needed for her traMADol (ULTRAM) 50 MG tablet medication. Patient states she is needing this to be sent over ASAP. Patient also cancelled her appointment that was scheduled for today with no reason provided. Please call patient back to discuss PA needed for her medication. Phone number is [phone]

## 2020-07-22 RX ORDER — CYCLOBENZAPRINE HCL 5 MG
5 TABLET ORAL 3 TIMES DAILY PRN
Qty: 90 TABLET | Refills: 2 | Status: SHIPPED | OUTPATIENT
Start: 2020-07-22 | End: 2020-12-15 | Stop reason: SDUPTHER

## 2020-07-22 RX ORDER — ATORVASTATIN CALCIUM 80 MG/1
80 TABLET, FILM COATED ORAL DAILY
Qty: 30 TABLET | Refills: 5 | Status: SHIPPED | OUTPATIENT
Start: 2020-07-22 | End: 2021-03-29

## 2020-07-22 NOTE — TELEPHONE ENCOUNTER
Caller: Nathaly Licea    Relationship: Self    Best call back number: [phone]    Medication needed:   Requested Prescriptions     Pending Prescriptions Disp Refills   • atorvastatin (Lipitor) 80 MG tablet 30 tablet 6     Sig: Take 1 tablet by mouth Daily.   • cyclobenzaprine (FLEXERIL) 5 MG tablet 90 tablet 2     Sig: Take 1 tablet by mouth 3 (Three) Times a Day As Needed for Muscle Spasms. for muscle spams       When do you need the refill by: 07/22/2020    What details did the patient provide when requesting the medication: COMPLETELY OUT    Does the patient have less than a 3 day supply:  [x] Yes  [] No    What is the patient's preferred pharmacy: [facility] #[id number] - [facility] [address] [facility] - [phone] [facility] [phone] FX

## 2020-08-11 ENCOUNTER — TELEPHONE (OUTPATIENT)
Dept: FAMILY MEDICINE CLINIC | Facility: CLINIC | Age: 65
End: 2020-08-11

## 2020-08-11 NOTE — TELEPHONE ENCOUNTER
PATIENT IS REQUESTING A REFILL FOR   HYDROcodone-acetaminophen (Norco) 5-325 MG per tablet. CONFIRMED PHARMACY AS [id number] [address] [facility] AT [address] OF [address] [facility] - [phone]  - [phone]  [phone]

## 2020-08-12 DIAGNOSIS — M54.41 CHRONIC BILATERAL LOW BACK PAIN WITH BILATERAL SCIATICA: ICD-10-CM

## 2020-08-12 DIAGNOSIS — M54.42 CHRONIC BILATERAL LOW BACK PAIN WITH BILATERAL SCIATICA: ICD-10-CM

## 2020-08-12 DIAGNOSIS — G89.29 CHRONIC BILATERAL LOW BACK PAIN WITH BILATERAL SCIATICA: ICD-10-CM

## 2020-08-12 RX ORDER — TRAMADOL HYDROCHLORIDE 50 MG/1
50 TABLET ORAL EVERY 8 HOURS PRN
Qty: 36 TABLET | Refills: 0 | Status: SHIPPED | OUTPATIENT
Start: 2020-08-12 | End: 2020-08-24

## 2020-08-19 RX ORDER — METOPROLOL SUCCINATE 25 MG/1
25 TABLET, EXTENDED RELEASE ORAL DAILY
Qty: 30 TABLET | Refills: 3 | Status: SHIPPED | OUTPATIENT
Start: 2020-08-19 | End: 2021-02-05 | Stop reason: SDUPTHER

## 2020-09-04 NOTE — PROGRESS NOTES
Subjective:  Nathaly Licea is a 64 y.o. female who presents for       Patient Active Problem List   Diagnosis   • Encounter for screening for diabetes mellitus   • Encounter for screening mammogram for malignant neoplasm of breast   • Encounter for screening for malignant neoplasm of colon   • Encounter for gynecological examination   • History of brain tumor   • Encounter for screening for endocrine disorder   • Encounter for screening for cardiovascular disorders   • Tobacco user   • Need for hepatitis C screening test   • Tobacco abuse counseling   • Cerebrovascular accident (CVA) (CMS/HCC)   • Need for immunization against influenza   • Chronic low back pain with bilateral sciatica   • Chronic back pain   • Right hemiparesis (CMS/HCC)   • Slurring of speech   • Hyperlipidemia   • Insomnia   • Constipation   • Iron deficiency anemia   • Hepatitis C antibody positive in blood   • Gait instability   • History of CVA (cerebrovascular accident)   • Essential hypertension   • Depression   • Deep vein thrombosis (DVT) of left upper extremity (CMS/HCC)   • Need for hepatitis vaccination   • History of cardiac arrest   • Gastroesophageal reflux disease   • Obesity   • Chronic hepatitis C without hepatic coma (CMS/HCC)   • Encounter for Papanicolaou smear of cervix   • Anemia   • General medical examination   • Irritable bowel syndrome with constipation   • Prediabetes   • Vitamin D deficiency   • Encounter for screening colonoscopy   • Screening mammogram, encounter for   • Stage 3 chronic kidney disease (CMS/HCC)   • Thoracic arthritis   • Arthritis, lumbar spine   • Ecchymosis   • Class 2 severe obesity with serious comorbidity and body mass index (BMI) of 37.0 to 37.9 in adult (CMS/HCC)   • Chronic bilateral low back pain with bilateral sciatica   • COPD with exacerbation (CMS/HCC)   • Acute pulmonary edema (CMS/HCC)   • Pulmonary hypertension (CMS/HCC)           Current Outpatient Medications:   •  albuterol  sulfate  (90 Base) MCG/ACT inhaler, INL 1 PUFF PO Q 4 H PRN, Disp: , Rfl:   •  amLODIPine (NORVASC) 10 MG tablet, Take 1 tablet by mouth Daily., Disp: 30 tablet, Rfl: 3  •  aspirin  MG tablet, Take 1 tablet by mouth Daily., Disp: 30 tablet, Rfl: 6  •  atorvastatin (Lipitor) 80 MG tablet, Take 1 tablet by mouth Daily., Disp: 30 tablet, Rfl: 5  •  clopidogrel (PLAVIX) 75 MG tablet, TAKE 1 TABLET BY MOUTH DAILY, Disp: 30 tablet, Rfl: 5  •  cyclobenzaprine (FLEXERIL) 5 MG tablet, Take 1 tablet by mouth 3 (Three) Times a Day As Needed for Muscle Spasms. for muscle spams, Disp: 90 tablet, Rfl: 2  •  FEROSUL 325 (65 Fe) MG tablet, TAKE 1 TABLET BY MOUTH EVERY MORNING WITH BREAKFAST AND 6 OZ OF OJ OR A VITAMIN TABLET TABLET, Disp: 30 tablet, Rfl: 6  •  furosemide (LASIX) 40 MG tablet, , Disp: , Rfl:   •  linaclotide (LINZESS) 145 MCG capsule capsule, Take 1 capsule by mouth Every Other Day., Disp: 30 capsule, Rfl: 6  •  lisinopril-hydrochlorothiazide (PRINZIDE,ZESTORETIC) 20-25 MG per tablet, Take 2 tablets by mouth Daily., Disp: 60 tablet, Rfl: 3  •  metoprolol succinate XL (Toprol XL) 25 MG 24 hr tablet, Take 1 tablet by mouth Daily., Disp: 30 tablet, Rfl: 3  •  nicotine (Nicotine Step 1) 21 MG/24HR patch, Place 1 patch on the skin as directed by provider Daily., Disp: 28 patch, Rfl: 3  •  pantoprazole (PROTONIX) 40 MG EC tablet, Take 1 tablet by mouth Daily., Disp: 30 tablet, Rfl: 6  •  potassium chloride (MICRO-K) 10 MEQ CR capsule, , Disp: , Rfl:   •  predniSONE (DELTASONE) 20 MG tablet, TK 2 TS PO IN A SINGLE DOSE, Disp: , Rfl:   •  vitamin D (ERGOCALCIFEROL) 1.25 MG (53039 UT) capsule capsule, TAKE 1 CAPSULE BY MOUTH 1 TIME EVERY WEEK, Disp: 4 capsule, Rfl: 3    Pt is 62 yo female with management of obesity, IBS constipation, history of CVA, history of meningioma sp removal,  Tobacco user, gait instability, high fall risk GERD, vitamin D defiicency, on chronic anticoagulation, HTN, HLP, CKD stage 2,  history of DVT in left upper extremity,  IBS-C, insomnia, chronic hep C,  history of cardiac arrest, prediabetes, iron deficiency anemia, subclinical hyperthryoidsim, history of cocaine use, benign thyroid nodules,  Degenerative changes of lumbar spine, or anterior wedging of T11 and T12, internal hemorrhoids, COPD, pulmonary hypertension, pulmonary edema         6/17/20 in office visit for recheck on pt's history of obesity tobacco use, history of CVA, HTN, HLP, CKD stage 3, iron deficiency anemia,, chronic lower back pain. Pt has yet to get labwork. She is feeling good overall. She has cut back on smoking BP is stable. She still has lower back pain.  She has appt with Pain Management  In [place] in August. Tramadol does help with back pain .she is requesting refill. No urinary symptoms. No pain on urination no bowel issus.     9/10/20 in office visit for recheck on pt's above medical issues. Since last visit pt had labwork on 6/19/20 that showed hep C RNA was negative hepatitis panel showed Hep C antibody. Vitamin D was normal iron profile was low normal with iron saturation at 21 and iron at 71.  Lipid pane lstable. hga1c normal. CMP showed  GFR  At 69 with normal liver enzymes, sodium at 134.  CBC showed hemoglobin at 109.8 with MCV at 83.. Do have last colonoscopy report in December 2019 with Dr. Loza that showed internal hemorrhoids. Pt was recently admitted to [facility].  Pt was admitted on 9/1/120 for shortness of breath acute bronchitis,  COPD, pulmonary hypertension, pulmonary edema. She was found to have pulmonary arterial hypertension per CT of chest on admission.  She was treated for COPD exacerbation.  She was also referred to Pulmonologist Dr. Carrington for pulmonary rehab. She was discharged with prednisone 40 mg daily for 5 days along with levaquin 750 mg PO q daily for 5 days albuterol as needed, advair discuks 250/50 every 12 hours lasix 20 mg daily, potassium chloride 10 mg twice daily for 5  days.  She was counseled to quit smoking and discharged on 9/3/20. She is feeling better since being release from hospital. She quit smoking since getting out of hospital she is now on nicotine patch. No chest pain no dizziness     Shortness of Breath   This is a chronic problem. The current episode started more than 1 year ago. The problem has been gradually worsening. Associated symptoms include sputum production. Pertinent negatives include no abdominal pain, chest pain, claudication, coryza, ear pain, fever, headaches, hemoptysis, leg pain, leg swelling, neck pain, orthopnea, PND, rash, rhinorrhea, sore throat, swollen glands, vomiting or wheezing. The symptoms are aggravated by smoke and emotional upset. Risk factors include smoking and prolonged immobilization. She has tried beta agonist inhalers and steroid inhalers for the symptoms. The treatment provided moderate relief. Her past medical history is significant for COPD. There is no history of allergies, aspirin allergies, asthma, bronchiolitis, CAD, chronic lung disease, DVT, a heart failure, PE, pneumonia or a recent surgery.   COPD   She complains of chest tightness, cough, difficulty breathing, shortness of breath and sputum production. There is no frequent throat clearing, hemoptysis, hoarse voice or wheezing. The current episode started more than 1 month ago. The problem occurs constantly. The problem has been waxing and waning. Pertinent negatives include no appetite change, chest pain, ear congestion, ear pain, fever, headaches, myalgias, PND, postnasal drip, rhinorrhea, sneezing, sore throat or trouble swallowing. Her symptoms are aggravated by emotional stress. Her symptoms are alleviated by beta-agonist, oral steroids and steroid inhaler. She reports moderate improvement on treatment. Risk factors for lung disease include smoking/tobacco exposure. Her past medical history is significant for COPD. There is no history of asthma, bronchiectasis or  pneumonia.   Obesity   This is a chronic problem. The current episode started more than 1 year ago. The problem occurs constantly. The problem has been unchanged. Associated symptoms include arthralgias, coughing, fatigue, numbness and weakness. Pertinent negatives include no abdominal pain, anorexia, change in bowel habit, chest pain, chills, congestion, diaphoresis, fever, headaches, joint swelling, myalgias, nausea, neck pain, rash, sore throat, swollen glands, urinary symptoms, vertigo, visual change or vomiting. Nothing aggravates the symptoms. She has tried nothing for the symptoms. The treatment provided no relief.   Hypertension   This is a chronic problem. The current episode started more than 1 year ago. The problem is unchanged. The problem is controlled. Pertinent negatives include no anxiety, blurred vision, chest pain, headaches, malaise/fatigue, neck pain, orthopnea, palpitations or shortness of breath. Risk factors for coronary artery disease include dyslipidemia, obesity, sedentary lifestyle and smoking/tobacco exposure. Past treatments include ACE inhibitors, diuretics and beta blockers. Current antihypertension treatment includes diuretics and beta blockers. The current treatment provides moderate improvement. Compliance problems include psychosocial issues.  There is no history of angina, kidney disease, CAD/MI, CVA, heart failure, left ventricular hypertrophy, PVD or retinopathy. There is no history of chronic renal disease, coarctation of the aorta, hyperaldosteronism, hypercortisolism, hyperparathyroidism, a hypertension causing med, pheochromocytoma, renovascular disease, sleep apnea or a thyroid problem.   Cerebrovascular Accident   This is a chronic  problem. The current episode started more than 1 month ago. The problem occurs daily. The problem has been waxing and waning. Associated symptoms include fatigue. Pertinent negatives include no abdominal pain, anorexia, arthralgias, change in  bowel habit, chest pain, chills, congestion, coughing, diaphoresis, fever, headaches, joint swelling, myalgias, nausea, neck pain, numbness, rash, sore throat, swollen glands, urinary symptoms, vertigo, visual change, vomiting or weakness. Nothing aggravates the symptoms. She has tried nothing for the symptoms. The treatment provided no relief.     Review of Systems  Review of Systems   Constitutional: Positive for fatigue. Negative for activity change, appetite change, chills, diaphoresis and fever.   HENT: Negative for congestion, ear pain, hoarse voice, postnasal drip, rhinorrhea, sinus pressure, sinus pain, sneezing, sore throat, trouble swallowing and voice change.    Respiratory: Positive for cough, sputum production and shortness of breath. Negative for hemoptysis, choking, chest tightness, wheezing and stridor.    Cardiovascular: Negative for chest pain, orthopnea, claudication, leg swelling and PND.   Gastrointestinal: Negative for abdominal pain, anorexia, change in bowel habit, diarrhea, nausea and vomiting.   Musculoskeletal: Positive for arthralgias, back pain and gait problem. Negative for joint swelling, myalgias and neck pain.   Skin: Negative for rash.   Neurological: Positive for weakness and numbness. Negative for vertigo and headaches.   Psychiatric/Behavioral: Positive for decreased concentration.       Patient Active Problem List   Diagnosis   • Encounter for screening for diabetes mellitus   • Encounter for screening mammogram for malignant neoplasm of breast   • Encounter for screening for malignant neoplasm of colon   • Encounter for gynecological examination   • History of brain tumor   • Encounter for screening for endocrine disorder   • Encounter for screening for cardiovascular disorders   • Tobacco user   • Need for hepatitis C screening test   • Tobacco abuse counseling   • Cerebrovascular accident (CVA) (CMS/HCC)   • Need for immunization against influenza   • Chronic low back pain  with bilateral sciatica   • Chronic back pain   • Right hemiparesis (CMS/HCC)   • Slurring of speech   • Hyperlipidemia   • Insomnia   • Constipation   • Iron deficiency anemia   • Hepatitis C antibody positive in blood   • Gait instability   • History of CVA (cerebrovascular accident)   • Essential hypertension   • Depression   • Deep vein thrombosis (DVT) of left upper extremity (CMS/HCC)   • Need for hepatitis vaccination   • History of cardiac arrest   • Gastroesophageal reflux disease   • Obesity   • Chronic hepatitis C without hepatic coma (CMS/HCC)   • Encounter for Papanicolaou smear of cervix   • Anemia   • General medical examination   • Irritable bowel syndrome with constipation   • Prediabetes   • Vitamin D deficiency   • Encounter for screening colonoscopy   • Screening mammogram, encounter for   • Stage 3 chronic kidney disease (CMS/HCC)   • Thoracic arthritis   • Arthritis, lumbar spine   • Ecchymosis   • Class 2 severe obesity with serious comorbidity and body mass index (BMI) of 37.0 to 37.9 in adult (CMS/HCC)   • Chronic bilateral low back pain with bilateral sciatica   • COPD with exacerbation (CMS/HCC)   • Acute pulmonary edema (CMS/HCC)   • Pulmonary hypertension (CMS/HCC)     Past Surgical History:   Procedure Laterality Date   • BRAIN SURGERY       Social History     Socioeconomic History   • Marital status: Legally      Spouse name: Not on file   • Number of children: Not on file   • Years of education: Not on file   • Highest education level: Not on file   Tobacco Use   • Smoking status: Current Every Day Smoker     Packs/day: 0.25     Types: Cigarettes   • Smokeless tobacco: Never Used   • Tobacco comment: 2-5 cigarettes a day   Substance and Sexual Activity   • Alcohol use: Yes     Comment: once a month, wine or beer   • Drug use: No   • Sexual activity: Yes     Partners: Male     Birth control/protection: None     Family History   Problem Relation Age of Onset   • Alcohol abuse  Other    • Diabetes Other    • Heart disease Other    • Hypertension Other    • COPD Other    • Alcohol abuse Mother    • Arthritis Mother    • COPD Mother    • Heart disease Mother    • Hypertension Mother    • Stroke Mother    • Diabetes Father    • Heart disease Father    • Diabetes Paternal Grandmother      Lab on 06/19/2020   Component Date Value Ref Range Status   • WBC 06/19/2020 4.89  3.40 - 10.80 10*3/mm3 Final   • RBC 06/19/2020 3.85  3.77 - 5.28 10*6/mm3 Final   • Hemoglobin 06/19/2020 10.8* 12.0 - 15.9 g/dL Final   • Hematocrit 06/19/2020 32.0* 34.0 - 46.6 % Final   • MCV 06/19/2020 83.1  79.0 - 97.0 fL Final   • MCH 06/19/2020 28.1  26.6 - 33.0 pg Final   • MCHC 06/19/2020 33.8  31.5 - 35.7 g/dL Final   • RDW 06/19/2020 17.9* 12.3 - 15.4 % Final   • RDW-SD 06/19/2020 54.3* 37.0 - 54.0 fl Final   • MPV 06/19/2020 11.3  6.0 - 12.0 fL Final   • Platelets 06/19/2020 223  140 - 450 10*3/mm3 Final   • Neutrophil % 06/19/2020 46.8  42.7 - 76.0 % Final   • Lymphocyte % 06/19/2020 39.3  19.6 - 45.3 % Final   • Monocyte % 06/19/2020 7.8  5.0 - 12.0 % Final   • Eosinophil % 06/19/2020 5.3  0.3 - 6.2 % Final   • Basophil % 06/19/2020 0.6  0.0 - 1.5 % Final   • Immature Grans % 06/19/2020 0.2  0.0 - 0.5 % Final   • Neutrophils, Absolute 06/19/2020 2.29  1.70 - 7.00 10*3/mm3 Final   • Lymphocytes, Absolute 06/19/2020 1.92  0.70 - 3.10 10*3/mm3 Final   • Monocytes, Absolute 06/19/2020 0.38  0.10 - 0.90 10*3/mm3 Final   • Eosinophils, Absolute 06/19/2020 0.26  0.00 - 0.40 10*3/mm3 Final   • Basophils, Absolute 06/19/2020 0.03  0.00 - 0.20 10*3/mm3 Final   • Immature Grans, Absolute 06/19/2020 0.01  0.00 - 0.05 10*3/mm3 Final   • nRBC 06/19/2020 0.0  0.0 - 0.2 /100 WBC Final   • Glucose 06/19/2020 89  65 - 99 mg/dL Final   • BUN 06/19/2020 21  8 - 23 mg/dL Final   • Creatinine 06/19/2020 0.99  0.57 - 1.00 mg/dL Final   • Sodium 06/19/2020 134* 136 - 145 mmol/L Final   • Potassium 06/19/2020 4.0  3.5 - 5.2 mmol/L Final      • Chloride 06/19/2020 98  98 - 107 mmol/L Final   • CO2 06/19/2020 24.5  22.0 - 29.0 mmol/L Final   • Calcium 06/19/2020 9.9  8.6 - 10.5 mg/dL Final   • Total Protein 06/19/2020 7.9  6.0 - 8.5 g/dL Final   • Albumin 06/19/2020 4.00  3.50 - 5.20 g/dL Final   • ALT (SGPT) 06/19/2020 14  1 - 33 U/L Final   • AST (SGOT) 06/19/2020 16  1 - 32 U/L Final   • Alkaline Phosphatase 06/19/2020 48  39 - 117 U/L Final   • Total Bilirubin 06/19/2020 0.2  0.2 - 1.2 mg/dL Final   • eGFR   Amer 06/19/2020 69  >60 mL/min/1.73 Final   • Globulin 06/19/2020 3.9  gm/dL Final   • A/G Ratio 06/19/2020 1.0  g/dL Final   • BUN/Creatinine Ratio 06/19/2020 21.2  7.0 - 25.0 Final   • Anion Gap 06/19/2020 11.5  5.0 - 15.0 mmol/L Final   • Hemoglobin A1C 06/19/2020 5.35  4.80 - 5.60 % Final   • Total Cholesterol 06/19/2020 156  0 - 200 mg/dL Final   • Triglycerides 06/19/2020 124  0 - 150 mg/dL Final   • HDL Cholesterol 06/19/2020 60  40 - 60 mg/dL Final   • LDL Cholesterol  06/19/2020 71  0 - 100 mg/dL Final   • VLDL Cholesterol 06/19/2020 24.8  5 - 40 mg/dL Final   • LDL/HDL Ratio 06/19/2020 1.19   Final   • 25 Hydroxy, Vitamin D 06/19/2020 32.0  30.0 - 100.0 ng/ml Final   • Iron 06/19/2020 71  37 - 145 mcg/dL Final   • Iron Saturation 06/19/2020 21  20 - 50 % Final   • Transferrin 06/19/2020 223  200 - 360 mg/dL Final   • TIBC 06/19/2020 332  298 - 536 mcg/dL Final   • Ferritin 06/19/2020 277.00* 13.00 - 150.00 ng/mL Final   • Hepatitis C Quantitation 06/19/2020 HCV Not Detected  IU/mL Final   • Test Information 06/19/2020 Comment   Final    The quantitative range of this assay is 15 IU/mL to 100 million IU/mL.   • Tramadol, Urine 06/19/2020 Negative  Mvbzid=450 ng/mL Final   • Hepatitis B Surface Ag 06/19/2020 Non-Reactive  Non-Reactive Final   • Hep A IgM 06/19/2020 Non-Reactive  Non-Reactive Final   • Hep B C IgM 06/19/2020 Non-Reactive  Non-Reactive Final   • Hepatitis C Ab 06/19/2020 Reactive* Non-Reactive Final   • Extra Tube  "06/19/2020 Hold for add-ons.   Final    Auto resulted.      XR Spine Lumbar 4+ View  Narrative: PROCEDURE: XR SPINE LUMBAR COMPLETE 4+VW    VIEWS:   5      INDICATION:  Pain    COMPARISON:  None available at time of image interpretation    FINDINGS:       - Fracture(s): No definite acute fracture. Minimal anterior  wedging of T12 and T11    - Alignment:  Within normal limits      - Disc space heights: Severely narrowed at L5-S1 which was  noted on a prior report from 3/29/2010. Images are not available  to review at this time. There is also narrowing at L4-L5.    - Focal osteolytic/blastic: None      - Bone density:  Grossly within normal limits for age    - Misc.:  Degenerative facet changes.  Impression: 1. Very mild, age-indeterminate anterior wedging of T11 and T12,  favored to be nonacute, but no previous studies are available at  this time for comparison  2. Degenerative changes as above    If pain or symptoms persist beyond reasonable expectations,   an MRI examination is suggested as is deemed clinically  appropriate.    Electronically signed by:  Maryan Hutchison MD  11/11/2019 1:12 PM  [facility] Workstation: [id number]    [email or web address]  Immunization History   Administered Date(s) Administered   • Flu Vaccine Quad PF 6-35MO 09/27/2017   • Flu Vaccine Quad PF >36MO 09/27/2017   • Flucelvax Quad Vial =>4yrs 10/31/2019   • Flulaval/Fluarix Quad 09/10/2020   • Hepatitis A 12/15/2017   • Hepatitis B Vaccine Adult IM 12/15/2017, 12/15/2017, 01/26/2018, 01/26/2018   • Influenza, Unspecified 10/31/2019       The following portions of the patient's history were reviewed and updated as appropriate: allergies, current medications, past family history, past medical history, past social history, past surgical history and problem list.        Physical Exam  /66 (BP Location: Right arm, Patient Position: Sitting, Cuff Size: Large Adult)   Pulse 96   Temp 98.3 °F (36.8 °C)   Ht 170.2 cm (67\")   Wt 108 kg (238 lb)   SpO2 99% "   BMI 37.28 kg/m²     Physical Exam   Constitutional: She is oriented to person, place, and time. She appears well-developed and well-nourished.   HENT:   Head: Normocephalic and atraumatic.   Right Ear: External ear normal.   Eyes: Pupils are equal, round, and reactive to light. Conjunctivae and EOM are normal.   Neck: Normal range of motion. Neck supple.   Cardiovascular: Normal rate, regular rhythm and normal heart sounds.   No murmur heard.  Pulmonary/Chest: No respiratory distress.   Decreased breath sounds    Abdominal: Soft. Bowel sounds are normal. She exhibits no distension. There is no tenderness.   Obese abdomen    Musculoskeletal: Normal range of motion. She exhibits tenderness. She exhibits no edema or deformity.   Gait instability    Get up and go test >10 seconds    Neurological: She is alert and oriented to person, place, and time. No cranial nerve deficit.   Skin: Skin is warm. No rash noted. She is not diaphoretic. No erythema. No pallor.   Psychiatric: She has a normal mood and affect. Her behavior is normal.   Nursing note and vitals reviewed.      Assessment/Plan    Diagnosis Plan   1. Screening mammogram, encounter for  Mammo Screening Bilateral With CAD   2. Essential hypertension     3. Class 2 severe obesity with serious comorbidity and body mass index (BMI) of 37.0 to 37.9 in adult, unspecified obesity type (CMS/HCC)     4. History of CVA (cerebrovascular accident)     5. COPD with exacerbation (CMS/HCC)     6. Pulmonary hypertension (CMS/HCC)     7. Acute pulmonary edema (CMS/HCC)     8. Need for immunization against influenza  FluLaval Quad >6 Months ([id number])             -went over labwork  -recommend mammogram - schedule at imaging center   -recommend shingles vaccination -gave prescription   -recommend influenza vaccination  - will get today in clinic   -COPD/COPD exacerbation/pulmonary HTN/ pulmonary edema- reviewed last discharge summary at [facility],  Pt referred to Pulmonology   [facility] for pulmonary rehab. Counseled quit smoking tobacco, on albuterol PRN, on Advair 250/50 mg every 12 hours, was on predisone 40 mg PO q daily for 5 days was on levaquin 750 mg daily for x 5 days. Now on lasix  20 mg PO BID x 5 days. . Need last office note from Dr Méndez   -vitamin D deficiency - vitamin D once a week  -subclincal hyperthyroidism- last thyroid test stable pt has bilateral benign appearing nodules.    -internal hemorrhoids - GI following. Next colonscopy in 2024.  Recommend high fiber diet   -hypertensionon norvasc 10 mg daily. lisionpril-HCTZ 20/12.5 mg daily. toprol XL 25 mgdaily.    -chronic back pain/mild degnerative changes of lumbar spine - recommend MRI.  Referred to pain management.  Was on cymbalta 60 mg PO q dailyi. Has been on tylenol PRN.  Continue on tramadol 50 mg every 8 hours PRN. DARYA printed and on file ref number [id number].  Will get UDS.   -gait instability/high fall risk - pt uses cane to ambulate   -chronic hep C - continue to monitor   - iron deficiency anemia - pt on iron supplements. Recommend  Possible Hematology referral for possible iron IV therapy .   -GERD - on protonix 40 mg PO q daily.    - tobacco user- counseled pt to stop smoking. Counseled >5 minutes she did not like chantix.liborio resend nicotine patch to pharmacy   -history of CVA /gait instability/slurring of speech/history of brain tumor -Neurology following currently stable  On aspirin 81 mg daily, on plavix 72 mg daily.   -hyperlipidemia - lipitor 80 mg PO qhs recheck lipid panel   -depression - stable. Pt is off celexa now  - constipation/IBS  - on linzess 145 mcg daily.    -obesity -  counseled weight loss >5 miinutes  BMI at 37.2   -advised pt to be safe and call with questions and concerns  -advised to go to ER or call 911 if symptoms worrisome or severe  -advised to followup with Specialist and referrals  -advised pt to be safe during COVID-19 pandemic  -total time with pt >25 minutes                  This document has been electronically signed by Brown Lion MD on September 11, 2020 07:20      .

## 2020-09-10 ENCOUNTER — OFFICE VISIT (OUTPATIENT)
Dept: FAMILY MEDICINE CLINIC | Facility: CLINIC | Age: 65
End: 2020-09-10

## 2020-09-10 VITALS
TEMPERATURE: 98.3 F | DIASTOLIC BLOOD PRESSURE: 66 MMHG | BODY MASS INDEX: 37.35 KG/M2 | WEIGHT: 238 LBS | HEIGHT: 67 IN | SYSTOLIC BLOOD PRESSURE: 104 MMHG | OXYGEN SATURATION: 99 % | HEART RATE: 96 BPM

## 2020-09-10 DIAGNOSIS — Z86.73 HISTORY OF CVA (CEREBROVASCULAR ACCIDENT): ICD-10-CM

## 2020-09-10 DIAGNOSIS — J44.1 COPD WITH EXACERBATION (HCC): ICD-10-CM

## 2020-09-10 DIAGNOSIS — Z23 NEED FOR IMMUNIZATION AGAINST INFLUENZA: ICD-10-CM

## 2020-09-10 DIAGNOSIS — I27.20 PULMONARY HYPERTENSION (HCC): ICD-10-CM

## 2020-09-10 DIAGNOSIS — Z12.31 SCREENING MAMMOGRAM, ENCOUNTER FOR: Primary | ICD-10-CM

## 2020-09-10 DIAGNOSIS — I10 ESSENTIAL HYPERTENSION: ICD-10-CM

## 2020-09-10 DIAGNOSIS — J81.0 ACUTE PULMONARY EDEMA (HCC): ICD-10-CM

## 2020-09-10 DIAGNOSIS — E66.01 CLASS 2 SEVERE OBESITY WITH SERIOUS COMORBIDITY AND BODY MASS INDEX (BMI) OF 37.0 TO 37.9 IN ADULT, UNSPECIFIED OBESITY TYPE (HCC): ICD-10-CM

## 2020-09-10 PROCEDURE — 90471 IMMUNIZATION ADMIN: CPT | Performed by: FAMILY MEDICINE

## 2020-09-10 PROCEDURE — 90686 IIV4 VACC NO PRSV 0.5 ML IM: CPT | Performed by: FAMILY MEDICINE

## 2020-09-10 PROCEDURE — 99214 OFFICE O/P EST MOD 30 MIN: CPT | Performed by: FAMILY MEDICINE

## 2020-09-10 RX ORDER — PREDNISONE 20 MG/1
TABLET ORAL
COMMUNITY
Start: 2020-09-03 | End: 2020-11-13

## 2020-09-10 RX ORDER — ALBUTEROL SULFATE 90 UG/1
AEROSOL, METERED RESPIRATORY (INHALATION)
COMMUNITY
Start: 2020-09-03 | End: 2020-12-14 | Stop reason: SDUPTHER

## 2020-09-10 RX ORDER — POTASSIUM CHLORIDE 750 MG/1
CAPSULE, EXTENDED RELEASE ORAL
COMMUNITY
Start: 2020-09-03 | End: 2021-12-23 | Stop reason: SDUPTHER

## 2020-09-10 RX ORDER — FUROSEMIDE 40 MG/1
TABLET ORAL
COMMUNITY
Start: 2020-09-03 | End: 2021-12-23 | Stop reason: SDUPTHER

## 2020-09-10 NOTE — PATIENT INSTRUCTIONS
Shingles    Shingles, which is also known as herpes zoster, is an infection that causes a painful skin rash and fluid-filled blisters. It is caused by a virus.  Shingles only develops in people who:  · Have had chickenpox.  · Have been given a medicine to protect against chickenpox (have been vaccinated). Shingles is rare in this group.  What are the causes?  Shingles is caused by varicella-zoster virus (VZV). This is the same virus that causes chickenpox. After a person is exposed to VZV, the virus stays in the body in an inactive (dormant) state. Shingles develops if the virus is reactivated. This can happen many years after the first (initial) exposure to VZV. It is not known what causes this virus to be reactivated.  What increases the risk?  People who have had chickenpox or received the chickenpox vaccine are at risk for shingles. Shingles infection is more common in people who:  · Are older than age 60.  · Have a weakened disease-fighting system (immune system), such as people with:  ? HIV.  ? AIDS.  ? Cancer.  · Are taking medicines that weaken the immune system, such as transplant medicines.  · Are experiencing a lot of stress.  What are the signs or symptoms?  Early symptoms of this condition include itching, tingling, and pain in an area on your skin. Pain may be described as burning, stabbing, or throbbing.  A few days or weeks after early symptoms start, a painful red rash appears. The rash is usually on one side of the body and has a band-like or belt-like pattern. The rash eventually turns into fluid-filled blisters that break open, change into scabs, and dry up in about 2-3 weeks.  At any time during the infection, you may also develop:  · A fever.  · Chills.  · A headache.  · An upset stomach.  How is this diagnosed?  This condition is diagnosed with a skin exam. Skin or fluid samples may be taken from the blisters before a diagnosis is made. These samples are examined under a microscope or sent to  a lab for testing.  How is this treated?  The rash may last for several weeks. There is not a specific cure for this condition. Your health care provider will probably prescribe medicines to help you manage pain, recover more quickly, and avoid long-term problems. Medicines may include:  · Antiviral drugs.  · Anti-inflammatory drugs.  · Pain medicines.  · Anti-itching medicines (antihistamines).  If the area involved is on your face, you may be referred to a specialist, such as an eye doctor (ophthalmologist) or an ear, nose, and throat (ENT) doctor (otolaryngologist) to help you avoid eye problems, chronic pain, or disability.  Follow these instructions at home:  Medicines  · Take over-the-counter and prescription medicines only as told by your health care provider.  · Apply an anti-itch cream or numbing cream to the affected area as told by your health care provider.  Relieving itching and discomfort    · Apply cold, wet cloths (cold compresses) to the area of the rash or blisters as told by your health care provider.  · Cool baths can be soothing. Try adding baking soda or dry oatmeal to the water to reduce itching. Do not bathe in hot water.  Blister and rash care  · Keep your rash covered with a loose bandage (dressing). Wear loose-fitting clothing to help ease the pain of material rubbing against the rash.  · Keep your rash and blisters clean by washing the area with mild soap and cool water as told by your health care provider.  · Check your rash every day for signs of infection. Check for:  ? More redness, swelling, or pain.  ? Fluid or blood.  ? Warmth.  ? Pus or a bad smell.  · Do not scratch your rash or pick at your blisters. To help avoid scratching:  ? Keep your fingernails clean and cut short.  ? Wear gloves or mittens while you sleep, if scratching is a problem.  General instructions  · Rest as told by your health care provider.  · Keep all follow-up visits as told by your health care provider. This  is important.  · Wash your hands often with soap and water. If soap and water are not available, use hand . Doing this lowers your chance of getting a bacterial skin infection.  · Before your blisters change into scabs, your shingles infection can cause chickenpox in people who have never had it or have never been vaccinated against it. To prevent this from happening, avoid contact with other people, especially:  ? Babies.  ? Pregnant women.  ? Children who have eczema.  ? Elderly people who have transplants.  ? People who have chronic illnesses, such as cancer or AIDS.  Contact a health care provider if:  · Your pain is not relieved with prescribed medicines.  · Your pain does not get better after the rash heals.  · You have signs of infection in the rash area, such as:  ? More redness, swelling, or pain around the rash.  ? Fluid or blood coming from the rash.  ? The rash area feeling warm to the touch.  ? Pus or a bad smell coming from the rash.  Get help right away if:  · The rash is on your face or nose.  · You have facial pain, pain around your eye area, or loss of feeling on one side of your face.  · You have difficulty seeing.  · You have ear pain or have ringing in your ear.  · You have a loss of taste.  · Your condition gets worse.  Summary  · Shingles, which is also known as herpes zoster, is an infection that causes a painful skin rash and fluid-filled blisters.  · This condition is diagnosed with a skin exam. Skin or fluid samples may be taken from the blisters and examined before the diagnosis is made.  · Keep your rash covered with a loose bandage (dressing). Wear loose-fitting clothing to help ease the pain of material rubbing against the rash.  · Before your blisters change into scabs, your shingles infection can cause chickenpox in people who have never had it or have never been vaccinated against it.  This information is not intended to replace advice given to you by your health care  provider. Make sure you discuss any questions you have with your health care provider.  Document Released: 12/18/2006 Document Revised: 04/10/2020 Document Reviewed: 08/22/2018  [employer] Patient Education © 2020 [employer]  Recombinant Zoster (Shingles) Vaccine: What You Need to Know  1. Why get vaccinated?  Recombinant zoster (shingles) vaccine can prevent shingles.  Shingles (also called herpes zoster, or just zoster) is a painful skin rash, usually with blisters. In addition to the rash, shingles can cause fever, headache, chills, or upset stomach. More rarely, shingles can lead to pneumonia, hearing problems, blindness, brain inflammation (encephalitis), or death.  The most common complication of shingles is long-term nerve pain called postherpetic neuralgia (PHN). PHN occurs in the areas where the shingles rash was, even after the rash clears up. It can last for months or years after the rash goes away. The pain from PHN can be severe and debilitating.  About 10 to 18% of people who get shingles will experience PHN. The risk of PHN increases with age. An older adult with shingles is more likely to develop PHN and have longer lasting and more severe pain than a younger person with shingles.  Shingles is caused by the varicella zoster virus, the same virus that causes chickenpox. After you have chickenpox, the virus stays in your body and can cause shingles later in life. Shingles cannot be passed from one person to another, but the virus that causes shingles can spread and cause chickenpox in someone who had never had chickenpox or received chickenpox vaccine.  2. Recombinant shingles vaccine  Recombinant shingles vaccine provides strong protection against shingles. By preventing shingles, recombinant shingles vaccine also protects against PHN.  Recombinant shingles vaccine is the preferred vaccine for the prevention of shingles. However, a different vaccine, live shingles vaccine, may be used in some  circumstances.  The recombinant shingles vaccine is recommended for adults 50 years and older without serious immune problems. It is given as a two-dose series.  This vaccine is also recommended for people who have already gotten another type of shingles vaccine, the live shingles vaccine. There is no live virus in this vaccine.  Shingles vaccine may be given at the same time as other vaccines.  3. Talk with your health care provider  Tell your vaccine provider if the person getting the vaccine:  · Has had an allergic reaction after a previous dose of recombinant shingles vaccine, or has any severe, life-threatening allergies.  · Is pregnant or breastfeeding.  · Is currently experiencing an episode of shingles.  In some cases, your health care provider may decide to postpone shingles vaccination to a future visit.  People with minor illnesses, such as a cold, may be vaccinated. People who are moderately or severely ill should usually wait until they recover before getting recombinant shingles vaccine.  Your health care provider can give you more information.  4. Risks of a vaccine reaction  · A sore arm with mild or moderate pain is very common after recombinant shingles vaccine, affecting about 80% of vaccinated people. Redness and swelling can also happen at the site of the injection.  · Tiredness, muscle pain, headache, shivering, fever, stomach pain, and nausea happen after vaccination in more than half of people who receive recombinant shingles vaccine.  In clinical trials, about 1 out of 6 people who got recombinant zoster vaccine experienced side effects that prevented them from doing regular activities. Symptoms usually went away on their own in 2 to 3 days.  You should still get the second dose of recombinant zoster vaccine even if you had one of these reactions after the first dose.  People sometimes faint after medical procedures, including vaccination. Tell your provider if you feel dizzy or have vision  changes or ringing in the ears.  As with any medicine, there is a very remote chance of a vaccine causing a severe allergic reaction, other serious injury, or death.  5. What if there is a serious problem?  An allergic reaction could occur after the vaccinated person leaves the clinic. If you see signs of a severe allergic reaction (hives, swelling of the face and throat, difficulty breathing, a fast heartbeat, dizziness, or weakness), call 9-1-1 and get the person to the nearest hospital.  For other signs that concern you, call your health care provider.  Adverse reactions should be reported to the Vaccine Adverse Event Reporting System (VAERS). Your health care provider will usually file this report, or you can do it yourself. Visit the VAERS website at www.vaers.[facility].gov or call 1-202.842.7420. VAERS is only for reporting reactions, and VAERS staff do not give medical advice.  6. How can I learn more?  · Ask your health care provider.  · Call your local or state health department.  · Contact the Centers for Disease Control and Prevention (CDC):  ? Call 1-644.311.3779 (5-896-SJR-INFO) or  ? Visit CDC's website at www.cdc.gov/vaccines  Vaccine Information Statement Recombinant Zoster Vaccine (10/30/2019)  This information is not intended to replace advice given to you by your health care provider. Make sure you discuss any questions you have with your health care provider.  Document Released: 02/27/2018 Document Revised: 04/07/2020 Document Reviewed: 07/24/2019  Elsevier Patient Education © 2020 [employer]

## 2020-10-06 RX ORDER — LISINOPRIL AND HYDROCHLOROTHIAZIDE 25; 20 MG/1; MG/1
2 TABLET ORAL DAILY
Qty: 60 TABLET | Refills: 3 | Status: SHIPPED | OUTPATIENT
Start: 2020-10-06 | End: 2021-02-17 | Stop reason: SDUPTHER

## 2020-10-30 ENCOUNTER — TELEPHONE (OUTPATIENT)
Dept: FAMILY MEDICINE CLINIC | Facility: CLINIC | Age: 65
End: 2020-10-30

## 2020-11-02 ENCOUNTER — TELEPHONE (OUTPATIENT)
Dept: FAMILY MEDICINE CLINIC | Facility: CLINIC | Age: 65
End: 2020-11-02

## 2020-11-13 ENCOUNTER — OFFICE VISIT (OUTPATIENT)
Dept: FAMILY MEDICINE CLINIC | Facility: CLINIC | Age: 65
End: 2020-11-13

## 2020-11-13 VITALS
HEART RATE: 66 BPM | OXYGEN SATURATION: 98 % | WEIGHT: 238 LBS | SYSTOLIC BLOOD PRESSURE: 100 MMHG | TEMPERATURE: 97.5 F | DIASTOLIC BLOOD PRESSURE: 70 MMHG | HEIGHT: 67 IN | BODY MASS INDEX: 37.35 KG/M2

## 2020-11-13 DIAGNOSIS — R91.1 PULMONARY NODULE: ICD-10-CM

## 2020-11-13 DIAGNOSIS — Z87.891 FORMER SMOKER: ICD-10-CM

## 2020-11-13 DIAGNOSIS — E78.5 HYPERLIPIDEMIA, UNSPECIFIED HYPERLIPIDEMIA TYPE: ICD-10-CM

## 2020-11-13 DIAGNOSIS — I10 ESSENTIAL HYPERTENSION: ICD-10-CM

## 2020-11-13 DIAGNOSIS — Z86.73 HISTORY OF CVA (CEREBROVASCULAR ACCIDENT): ICD-10-CM

## 2020-11-13 DIAGNOSIS — E55.9 VITAMIN D DEFICIENCY: ICD-10-CM

## 2020-11-13 DIAGNOSIS — D50.9 IRON DEFICIENCY ANEMIA, UNSPECIFIED IRON DEFICIENCY ANEMIA TYPE: ICD-10-CM

## 2020-11-13 DIAGNOSIS — Z87.898 HISTORY OF PREDIABETES: ICD-10-CM

## 2020-11-13 DIAGNOSIS — N18.30 STAGE 3 CHRONIC KIDNEY DISEASE, UNSPECIFIED WHETHER STAGE 3A OR 3B CKD (HCC): ICD-10-CM

## 2020-11-13 DIAGNOSIS — Z12.31 SCREENING MAMMOGRAM, ENCOUNTER FOR: Primary | ICD-10-CM

## 2020-11-13 PROCEDURE — 99214 OFFICE O/P EST MOD 30 MIN: CPT | Performed by: FAMILY MEDICINE

## 2020-11-13 RX ORDER — GABAPENTIN 300 MG/1
CAPSULE ORAL
COMMUNITY
Start: 2020-10-31

## 2020-11-13 RX ORDER — HYDROCODONE BITARTRATE AND ACETAMINOPHEN 7.5; 325 MG/1; MG/1
TABLET ORAL
COMMUNITY
Start: 2020-10-30

## 2020-11-13 NOTE — PATIENT INSTRUCTIONS
Ask Dr. Carrington to inquire about lung nodules. Supposed to get repeat CT of chest in December 2020.

## 2020-12-14 RX ORDER — AMLODIPINE BESYLATE 10 MG/1
10 TABLET ORAL DAILY
Qty: 30 TABLET | Refills: 3 | Status: SHIPPED | OUTPATIENT
Start: 2020-12-14 | End: 2021-05-14 | Stop reason: SDUPTHER

## 2020-12-14 RX ORDER — ALBUTEROL SULFATE 90 UG/1
1 AEROSOL, METERED RESPIRATORY (INHALATION) EVERY 4 HOURS PRN
Qty: 18 G | Refills: 3 | Status: SHIPPED | OUTPATIENT
Start: 2020-12-14 | End: 2021-02-26 | Stop reason: SDUPTHER

## 2020-12-14 NOTE — TELEPHONE ENCOUNTER
Caller: Molina Liceaberenicemarline HARGROVE    Relationship: Self    Best call back number: [phone]    Medication needed:   Requested Prescriptions     Pending Prescriptions Disp Refills   • albuterol sulfate  (90 Base) MCG/ACT inhaler          When do you need the refill by: SARAN    What details did the patient provide when requesting the medication: PATIENT ADVISED THAT SHE WAS SEEN IN ER LAST NIGHT AFTER INHALING CLEANING CHEMICALS AND THEY ADVISED THAT SHE NEEDED A REFILL ON HER RESCUE INHALER.   Does the patient have less than a 3 day supply:  [x] Yes  [] No    What is the patient's preferred pharmacy: [id number] [address] [facility] - [phone] [facility] [phone] FX

## 2020-12-15 RX ORDER — CYCLOBENZAPRINE HCL 5 MG
5 TABLET ORAL 3 TIMES DAILY PRN
Qty: 90 TABLET | Refills: 2 | Status: SHIPPED | OUTPATIENT
Start: 2020-12-15 | End: 2021-05-03 | Stop reason: SDUPTHER

## 2021-02-05 RX ORDER — METOPROLOL SUCCINATE 25 MG/1
25 TABLET, EXTENDED RELEASE ORAL DAILY
Qty: 30 TABLET | Refills: 3 | Status: SHIPPED | OUTPATIENT
Start: 2021-02-05 | End: 2021-07-13 | Stop reason: SDUPTHER

## 2021-02-08 RX ORDER — NICOTINE 21 MG/24HR
1 PATCH, TRANSDERMAL 24 HOURS TRANSDERMAL EVERY 24 HOURS
Qty: 28 PATCH | Refills: 3 | Status: SHIPPED | OUTPATIENT
Start: 2021-02-08

## 2021-02-17 RX ORDER — LISINOPRIL AND HYDROCHLOROTHIAZIDE 25; 20 MG/1; MG/1
2 TABLET ORAL DAILY
Qty: 60 TABLET | Refills: 3 | Status: SHIPPED | OUTPATIENT
Start: 2021-02-17 | End: 2021-07-13 | Stop reason: SDUPTHER

## 2021-02-26 RX ORDER — ALBUTEROL SULFATE 90 UG/1
1 AEROSOL, METERED RESPIRATORY (INHALATION) EVERY 4 HOURS PRN
Qty: 18 G | Refills: 3 | Status: SHIPPED | OUTPATIENT
Start: 2021-02-26 | End: 2021-12-23 | Stop reason: SDUPTHER

## 2021-03-11 ENCOUNTER — TRANSCRIBE ORDERS (OUTPATIENT)
Dept: LAB | Facility: HOSPITAL | Age: 66
End: 2021-03-11

## 2021-03-29 RX ORDER — ATORVASTATIN CALCIUM 80 MG/1
80 TABLET, FILM COATED ORAL DAILY
Qty: 30 TABLET | Refills: 0 | Status: SHIPPED | OUTPATIENT
Start: 2021-03-29 | End: 2021-05-17

## 2021-04-08 ENCOUNTER — OFFICE VISIT (OUTPATIENT)
Dept: FAMILY MEDICINE CLINIC | Facility: CLINIC | Age: 66
End: 2021-04-08

## 2021-04-08 VITALS — HEIGHT: 68 IN | BODY MASS INDEX: 36.19 KG/M2

## 2021-04-08 DIAGNOSIS — I10 ESSENTIAL HYPERTENSION: ICD-10-CM

## 2021-04-08 DIAGNOSIS — Z87.891 FORMER HEAVY TOBACCO SMOKER: ICD-10-CM

## 2021-04-08 DIAGNOSIS — R73.03 PREDIABETES: ICD-10-CM

## 2021-04-08 DIAGNOSIS — E66.01 CLASS 2 SEVERE OBESITY WITH SERIOUS COMORBIDITY AND BODY MASS INDEX (BMI) OF 36.0 TO 36.9 IN ADULT, UNSPECIFIED OBESITY TYPE (HCC): Primary | ICD-10-CM

## 2021-04-08 DIAGNOSIS — E78.2 MIXED HYPERLIPIDEMIA: ICD-10-CM

## 2021-04-08 PROCEDURE — 99443 PR PHYS/QHP TELEPHONE EVALUATION 21-30 MIN: CPT | Performed by: FAMILY MEDICINE

## 2021-05-03 RX ORDER — CYCLOBENZAPRINE HCL 5 MG
5 TABLET ORAL 3 TIMES DAILY PRN
Qty: 90 TABLET | Refills: 2 | Status: SHIPPED | OUTPATIENT
Start: 2021-05-03 | End: 2021-12-23 | Stop reason: SDUPTHER

## 2021-05-03 RX ORDER — CLOPIDOGREL BISULFATE 75 MG/1
75 TABLET ORAL DAILY
Qty: 30 TABLET | Refills: 5 | Status: SHIPPED | OUTPATIENT
Start: 2021-05-03 | End: 2021-12-27

## 2021-05-04 RX ORDER — FERROUS SULFATE 325(65) MG
325 TABLET ORAL
Qty: 30 TABLET | Refills: 3 | Status: SHIPPED | OUTPATIENT
Start: 2021-05-04 | End: 2021-12-23 | Stop reason: SDUPTHER

## 2021-05-14 RX ORDER — AMLODIPINE BESYLATE 10 MG/1
10 TABLET ORAL DAILY
Qty: 30 TABLET | Refills: 3 | Status: SHIPPED | OUTPATIENT
Start: 2021-05-14 | End: 2021-12-23 | Stop reason: SDUPTHER

## 2021-05-17 RX ORDER — ATORVASTATIN CALCIUM 80 MG/1
80 TABLET, FILM COATED ORAL DAILY
Qty: 30 TABLET | Refills: 3 | Status: SHIPPED | OUTPATIENT
Start: 2021-05-17 | End: 2021-06-22

## 2021-06-22 RX ORDER — ATORVASTATIN CALCIUM 80 MG/1
80 TABLET, FILM COATED ORAL DAILY
Qty: 30 TABLET | Refills: 3 | Status: SHIPPED | OUTPATIENT
Start: 2021-06-22 | End: 2021-07-28 | Stop reason: SDUPTHER

## 2021-06-24 ENCOUNTER — TELEPHONE (OUTPATIENT)
Dept: FAMILY MEDICINE CLINIC | Facility: CLINIC | Age: 66
End: 2021-06-24

## 2021-06-24 RX ORDER — PANTOPRAZOLE SODIUM 40 MG/1
40 TABLET, DELAYED RELEASE ORAL DAILY
Qty: 30 TABLET | Refills: 6 | Status: SHIPPED | OUTPATIENT
Start: 2021-06-24 | End: 2021-12-23 | Stop reason: SDUPTHER

## 2021-06-24 NOTE — TELEPHONE ENCOUNTER
Patient said she realizes she has a appointment in July. But she's having bad heart burn again. Was wondering if she could get something prescribed to her.

## 2021-07-05 NOTE — PROGRESS NOTES
Chief Complaint  COPD, Vitamin D Deficiency, Hypertension, Back Pain, Anemia, Heartburn, Hyperlipidemia, and Irritable Bowel Syndrome    Subjective          Nathaly Licea presents to [facility]       Pt is 64 yo female with management of obesity, IBS constipation, history of CVA, history of meningioma sp removal,  former tobacco user, gait instability, high fall risk GERD, vitamin D defiicency, on chronic anticoagulation, HTN, HLP, CKD stage 2, history of DVT in left upper extremity,  IBS-C, insomnia, chronic hep C,  history of cardiac arrest, prediabetes, iron deficiency anemia, subclinical hyperthryoidsim, history of cocaine use, benign thyroid nodules,  Degenerative changes of lumbar spine, or anterior wedging of T11 and T12, internal hemorrhoids, COPD, pulmonary hypertension, pulmonary edema, pulmonary nodule        4/8/21 telemedicine visit for recheck on pt's above medical issues. Pt has yet to get labwork ordered on 11/13/20. Pt continues to take her medications for HTN, smoking cessation, HLP, constipation, edema of legs, Vitamin D deficiency, history of CVA. She is doing well overall doing well on medications. She is concerned about COVID19 vaccination. She has yet to do this. She quit smoking recently.  Per patient BP stable. No chest pain no dizziness.  Breathing stable     7/28/21 in office visit for recheck on pt's above medical issues. Pt has yet to get labwork ordered on 11/13/21 . Pt continues to take her medications for HTN, HLP, vitamin D deficiency, history of CVA, GERD . She is back to smoking tobacco about 2-3 cigarettes daily. BP is stable. She recently saw her  Pulmonlogiist and is on Anoro. And albuterol inhaler PRN. She was treated with abx. She has low grade temp today          Nicotine Dependence  Presents for follow-up visit. The symptoms have been stable. She smokes < 1/2 a pack of cigarettes per day. Compliance with prior treatments has been variable.    Hyperlipidemia  This is a chronic problem. The current episode started more than 1 year ago. Recent lipid tests were reviewed and are variable. She has no history of chronic renal disease, diabetes, hypothyroidism, liver disease, obesity or nephrotic syndrome. Current antihyperlipidemic treatment includes statins. The current treatment provides moderate improvement of lipids. Risk factors for coronary artery disease include hypertension, a sedentary lifestyle, post-menopausal, stress, dyslipidemia and obesity.   Obesity   This is a chronic problem. The current episode started more than 1 year ago. The problem occurs constantly. The problem has been unchanged. Associated symptoms include arthralgias, coughing, fatigue, numbness and weakness. Pertinent negatives include no abdominal pain, anorexia, change in bowel habit, chest pain, chills, congestion, diaphoresis, fever, headaches, joint swelling, myalgias, nausea, neck pain, rash, sore throat, swollen glands, urinary symptoms, vertigo, visual change or vomiting. Nothing aggravates the symptoms. She has tried nothing for the symptoms. The treatment provided no relief.   Hypertension   This is a chronic problem. The current episode started more than 1 year ago. The problem is unchanged. The problem is controlled. Pertinent negatives include no anxiety, blurred vision, chest pain, headaches, malaise/fatigue, neck pain, orthopnea, palpitations or shortness of breath. Risk factors for coronary artery disease include dyslipidemia, obesity, sedentary lifestyle and smoking/tobacco exposure. Past treatments include ACE inhibitors, diuretics and beta blockers. Current antihypertension treatment includes diuretics and beta blockers. The current treatment provides moderate improvement. Compliance problems include psychosocial issues.  There is no history of angina, kidney disease, CAD/MI, CVA, heart failure, left ventricular hypertrophy, PVD or retinopathy. There is no  "history of chronic renal disease, coarctation of the aorta, hyperaldosteronism, hypercortisolism, hyperparathyroidism, a hypertension causing med, pheochromocytoma, renovascular disease, sleep apnea or a thyroid problem.       Objective   Vital Signs:   /66 (BP Location: Right arm, Patient Position: Sitting, Cuff Size: Large Adult)   Pulse 80   Temp (!) 100.7 °F (38.2 °C)   Ht 172.7 cm (68\")   Wt 106 kg (233 lb 6.4 oz)   SpO2 95%   BMI 35.49 kg/m²     Physical Exam  Vitals and nursing note reviewed.   Constitutional:       Appearance: She is well-developed. She is not diaphoretic.   HENT:      Head: Normocephalic and atraumatic.      Right Ear: External ear normal.   Eyes:      Conjunctiva/sclera: Conjunctivae normal.      Pupils: Pupils are equal, round, and reactive to light.   Cardiovascular:      Rate and Rhythm: Normal rate and regular rhythm.      Heart sounds: Normal heart sounds. No murmur heard.     Pulmonary:      Effort: Pulmonary effort is normal. No respiratory distress.      Breath sounds: Normal breath sounds.   Abdominal:      General: Bowel sounds are normal. There is no distension.      Palpations: Abdomen is soft.      Tenderness: There is no abdominal tenderness.      Comments: Obese abdomen    Musculoskeletal:         General: Tenderness present. No deformity. Normal range of motion.      Cervical back: Normal range of motion and neck supple.      Comments: Gait instability    High fall risk    Skin:     General: Skin is warm.      Coloration: Skin is not pale.      Findings: No erythema or rash.   Neurological:      Mental Status: She is alert and oriented to person, place, and time.      Cranial Nerves: No cranial nerve deficit.   Psychiatric:         Behavior: Behavior normal.        Result Review :                 Assessment and Plan    Diagnoses and all orders for this visit:    1. Fever, unspecified fever cause (Primary)  -     XR Chest PA & Lateral; Future    2. Stage 3a " chronic kidney disease (CMS/HCC)    3. Tobacco user    4. Vitamin D deficiency    5. Prediabetes    6. History of CVA (cerebrovascular accident)    7. Mixed hyperlipidemia    8. Iron deficiency anemia, unspecified iron deficiency anemia type    9. Gait instability    10. Essential hypertension    11. Class 2 severe obesity with serious comorbidity and body mass index (BMI) of 35.0 to 35.9 in adult, unspecified obesity type (CMS/HCC)    12. Annual physical exam    Other orders  -     atorvastatin (LIPITOR) 80 MG tablet; Take 1 tablet by mouth Daily.  Dispense: 30 tablet; Refill: 3  -     lisinopril-hydrochlorothiazide (PRINZIDE,ZESTORETIC) 20-25 MG per tablet; Take 2 tablets by mouth Daily.  Dispense: 60 tablet; Refill: 3         -recommend labwork   -recommend mammogram - schedule at imaging center   -recommend shingles vaccination -gave prescription   -recommend pneumonia vaccination   -annual physical exam today  -recommend COVID-19 vaccination   --low grade fever/syepna chest x-ray today.    -COPD/COPD exacerbation/pulmonary HTN/ pulmonary edema- Pulmonology following on [place].  -pulmonary nodule/lymph node in lung - adivsed pt to followup with Pulmonologist soon and gt repeat CT of chest in December 2020   -vitamin D deficiency - vitamin D once a week  -hypertension stable on norvasc 10 mg daily. lisionpril-HCTZ 20/12.5 mg daily. toprol XL 25 mgdaily.   -tobacco user - recommend 1 800 QUIT Now and nicotine patches   -chronic back pain/mild degnerative changes of lumbar spine - recommend MRI.  Referred to pain management.  Was on cymbalta 60 mg PO q dailyi. Has been on tylenol PRN.  Continue on tramadol 50 mg every 8 hours PRN. DARYA printed and on file ref number [id number].  Will get UDS.   -gait instability/high fall risk - pt uses cane to ambulate   -chronic hep C - continue to monitor   -iron deficiency anemia - pt on iron supplements. Recommend  Possible Hematology referral for possible iron IV therapy .    -GERD - on protonix 40 mg PO q daily.    -history of CVA /gait instability/slurring of speech/history of brain tumor -Neurology following currently stable  On aspirin 81 mg daily, on plavix 72 mg daily.   -hyperlipidemia - stable lipitor 80 mg PO qhs recheck lipid panel DASH diet/heart healthy diet    -constipation/IBS  - stable on linzess 145 mcg daily.    -obesity -  counseled weight loss >5 miinutes  BMI at 35.49   -advised pt to be safe and call with questions and concerns  -advised to go to ER or call 911 if symptoms worrisome or severe  -advised to followup with Specialist and referrals  -advised pt to be safe during COVID-19 pandemic  I spent 30 minutes caring for Nathaly on this date of service. This time includes time spent by me in the following activities: preparing for the visit, reviewing tests, obtaining and/or reviewing a separately obtained history, performing a medically appropriate examination and/or evaluation, counseling and educating the patient/family/caregiver, ordering medications, tests, or procedures, referring and communicating with other health care professionals, documenting information in the medical record, independently interpreting results and communicating that information with the patient/family/caregiver and care coordination.   -recheck in 4 weeks         This document has been electronically signed by Brown Lion MD on July 28, 2021 15:03 CDT          Follow Up   Return in about 4 weeks (around 8/25/2021).  Patient was given instructions and counseling regarding her condition or for health maintenance advice. Please see specific information pulled into the AVS if appropriate.

## 2021-07-13 RX ORDER — LISINOPRIL AND HYDROCHLOROTHIAZIDE 25; 20 MG/1; MG/1
2 TABLET ORAL DAILY
Qty: 60 TABLET | Refills: 3 | Status: SHIPPED | OUTPATIENT
Start: 2021-07-13 | End: 2021-07-28 | Stop reason: SDUPTHER

## 2021-07-13 RX ORDER — METOPROLOL SUCCINATE 25 MG/1
25 TABLET, EXTENDED RELEASE ORAL DAILY
Qty: 30 TABLET | Refills: 3 | Status: SHIPPED | OUTPATIENT
Start: 2021-07-13 | End: 2021-08-23

## 2021-07-28 ENCOUNTER — OFFICE VISIT (OUTPATIENT)
Dept: FAMILY MEDICINE CLINIC | Facility: CLINIC | Age: 66
End: 2021-07-28

## 2021-07-28 ENCOUNTER — LAB (OUTPATIENT)
Dept: LAB | Facility: HOSPITAL | Age: 66
End: 2021-07-28

## 2021-07-28 VITALS
HEIGHT: 68 IN | TEMPERATURE: 100.7 F | OXYGEN SATURATION: 95 % | SYSTOLIC BLOOD PRESSURE: 128 MMHG | HEART RATE: 80 BPM | WEIGHT: 233.4 LBS | DIASTOLIC BLOOD PRESSURE: 66 MMHG | BODY MASS INDEX: 35.37 KG/M2

## 2021-07-28 DIAGNOSIS — Z86.73 HISTORY OF CVA (CEREBROVASCULAR ACCIDENT): ICD-10-CM

## 2021-07-28 DIAGNOSIS — D50.9 IRON DEFICIENCY ANEMIA, UNSPECIFIED IRON DEFICIENCY ANEMIA TYPE: ICD-10-CM

## 2021-07-28 DIAGNOSIS — R50.9 FEVER, UNSPECIFIED FEVER CAUSE: Primary | ICD-10-CM

## 2021-07-28 DIAGNOSIS — Z72.0 TOBACCO USER: ICD-10-CM

## 2021-07-28 DIAGNOSIS — E66.01 CLASS 2 SEVERE OBESITY WITH SERIOUS COMORBIDITY AND BODY MASS INDEX (BMI) OF 35.0 TO 35.9 IN ADULT, UNSPECIFIED OBESITY TYPE (HCC): ICD-10-CM

## 2021-07-28 DIAGNOSIS — E78.5 HYPERLIPIDEMIA, UNSPECIFIED HYPERLIPIDEMIA TYPE: ICD-10-CM

## 2021-07-28 DIAGNOSIS — R73.03 PREDIABETES: ICD-10-CM

## 2021-07-28 DIAGNOSIS — N18.30 STAGE 3 CHRONIC KIDNEY DISEASE, UNSPECIFIED WHETHER STAGE 3A OR 3B CKD (HCC): ICD-10-CM

## 2021-07-28 DIAGNOSIS — R26.81 GAIT INSTABILITY: ICD-10-CM

## 2021-07-28 DIAGNOSIS — E55.9 VITAMIN D DEFICIENCY: ICD-10-CM

## 2021-07-28 DIAGNOSIS — I10 ESSENTIAL HYPERTENSION: ICD-10-CM

## 2021-07-28 DIAGNOSIS — E78.2 MIXED HYPERLIPIDEMIA: ICD-10-CM

## 2021-07-28 DIAGNOSIS — N18.31 STAGE 3A CHRONIC KIDNEY DISEASE (HCC): ICD-10-CM

## 2021-07-28 DIAGNOSIS — Z00.00 ANNUAL PHYSICAL EXAM: ICD-10-CM

## 2021-07-28 PROCEDURE — 99214 OFFICE O/P EST MOD 30 MIN: CPT | Performed by: FAMILY MEDICINE

## 2021-07-28 PROCEDURE — 85025 COMPLETE CBC W/AUTO DIFF WBC: CPT

## 2021-07-28 PROCEDURE — 82306 VITAMIN D 25 HYDROXY: CPT

## 2021-07-28 PROCEDURE — 83036 HEMOGLOBIN GLYCOSYLATED A1C: CPT

## 2021-07-28 PROCEDURE — 84466 ASSAY OF TRANSFERRIN: CPT

## 2021-07-28 PROCEDURE — 83540 ASSAY OF IRON: CPT

## 2021-07-28 PROCEDURE — 80053 COMPREHEN METABOLIC PANEL: CPT

## 2021-07-28 PROCEDURE — 82728 ASSAY OF FERRITIN: CPT

## 2021-07-28 PROCEDURE — 80061 LIPID PANEL: CPT

## 2021-07-28 RX ORDER — ATORVASTATIN CALCIUM 80 MG/1
80 TABLET, FILM COATED ORAL DAILY
Qty: 30 TABLET | Refills: 3 | Status: SHIPPED | OUTPATIENT
Start: 2021-07-28 | End: 2021-11-24

## 2021-07-28 RX ORDER — LISINOPRIL AND HYDROCHLOROTHIAZIDE 25; 20 MG/1; MG/1
2 TABLET ORAL DAILY
Qty: 60 TABLET | Refills: 3 | Status: SHIPPED | OUTPATIENT
Start: 2021-07-28 | End: 2021-11-24

## 2021-07-29 LAB
25(OH)D3 SERPL-MCNC: 18.1 NG/ML
ALBUMIN SERPL-MCNC: 4.1 G/DL (ref 3.5–5.2)
ALBUMIN/GLOB SERPL: 1 G/DL
ALP SERPL-CCNC: 97 U/L (ref 39–117)
ALT SERPL W P-5'-P-CCNC: 11 U/L (ref 1–33)
ANION GAP SERPL CALCULATED.3IONS-SCNC: 15.8 MMOL/L (ref 5–15)
AST SERPL-CCNC: 12 U/L (ref 1–32)
BASOPHILS # BLD AUTO: 0.07 10*3/MM3 (ref 0–0.2)
BASOPHILS NFR BLD AUTO: 0.9 % (ref 0–1.5)
BILIRUB SERPL-MCNC: 0.2 MG/DL (ref 0–1.2)
BUN SERPL-MCNC: 46 MG/DL (ref 8–23)
BUN/CREAT SERPL: 14.9 (ref 7–25)
CALCIUM SPEC-SCNC: 9.2 MG/DL (ref 8.6–10.5)
CHLORIDE SERPL-SCNC: 100 MMOL/L (ref 98–107)
CHOLEST SERPL-MCNC: 211 MG/DL (ref 0–200)
CO2 SERPL-SCNC: 19.2 MMOL/L (ref 22–29)
CREAT SERPL-MCNC: 3.08 MG/DL (ref 0.57–1)
DEPRECATED RDW RBC AUTO: 51.4 FL (ref 37–54)
EOSINOPHIL # BLD AUTO: 0.65 10*3/MM3 (ref 0–0.4)
EOSINOPHIL NFR BLD AUTO: 8.3 % (ref 0.3–6.2)
ERYTHROCYTE [DISTWIDTH] IN BLOOD BY AUTOMATED COUNT: 18 % (ref 12.3–15.4)
FERRITIN SERPL-MCNC: 411 NG/ML (ref 13–150)
GFR SERPL CREATININE-BSD FRML MDRD: 19 ML/MIN/1.73
GLOBULIN UR ELPH-MCNC: 4.2 GM/DL
GLUCOSE SERPL-MCNC: 94 MG/DL (ref 65–99)
HBA1C MFR BLD: 5.27 % (ref 4.8–5.6)
HCT VFR BLD AUTO: 34.2 % (ref 34–46.6)
HDLC SERPL-MCNC: 61 MG/DL (ref 40–60)
HGB BLD-MCNC: 11.6 G/DL (ref 12–15.9)
IMM GRANULOCYTES # BLD AUTO: 0.02 10*3/MM3 (ref 0–0.05)
IMM GRANULOCYTES NFR BLD AUTO: 0.3 % (ref 0–0.5)
IRON 24H UR-MRATE: 72 MCG/DL (ref 37–145)
IRON SATN MFR SERPL: 22 % (ref 20–50)
LDLC SERPL CALC-MCNC: 125 MG/DL (ref 0–100)
LDLC/HDLC SERPL: 1.99 {RATIO}
LYMPHOCYTES # BLD AUTO: 3.26 10*3/MM3 (ref 0.7–3.1)
LYMPHOCYTES NFR BLD AUTO: 41.7 % (ref 19.6–45.3)
MCH RBC QN AUTO: 27.2 PG (ref 26.6–33)
MCHC RBC AUTO-ENTMCNC: 33.9 G/DL (ref 31.5–35.7)
MCV RBC AUTO: 80.3 FL (ref 79–97)
MONOCYTES # BLD AUTO: 0.72 10*3/MM3 (ref 0.1–0.9)
MONOCYTES NFR BLD AUTO: 9.2 % (ref 5–12)
NEUTROPHILS NFR BLD AUTO: 3.09 10*3/MM3 (ref 1.7–7)
NEUTROPHILS NFR BLD AUTO: 39.6 % (ref 42.7–76)
NRBC BLD AUTO-RTO: 0 /100 WBC (ref 0–0.2)
PLATELET # BLD AUTO: 288 10*3/MM3 (ref 140–450)
PMV BLD AUTO: 11.6 FL (ref 6–12)
POTASSIUM SERPL-SCNC: 4.4 MMOL/L (ref 3.5–5.2)
PROT SERPL-MCNC: 8.3 G/DL (ref 6–8.5)
RBC # BLD AUTO: 4.26 10*6/MM3 (ref 3.77–5.28)
SODIUM SERPL-SCNC: 135 MMOL/L (ref 136–145)
TIBC SERPL-MCNC: 320 MCG/DL (ref 298–536)
TRANSFERRIN SERPL-MCNC: 215 MG/DL (ref 200–360)
TRIGL SERPL-MCNC: 142 MG/DL (ref 0–150)
VLDLC SERPL-MCNC: 25 MG/DL (ref 5–40)
WBC # BLD AUTO: 7.81 10*3/MM3 (ref 3.4–10.8)

## 2021-07-30 ENCOUNTER — TELEPHONE (OUTPATIENT)
Dept: FAMILY MEDICINE CLINIC | Facility: CLINIC | Age: 66
End: 2021-07-30

## 2021-07-30 DIAGNOSIS — N17.9 ACUTE KIDNEY INJURY (HCC): Primary | ICD-10-CM

## 2021-07-30 NOTE — TELEPHONE ENCOUNTER
Gave pt results and recommendations. She voiced understanding and stated she has not been taking Lipitor but will start. She will come get labs done.

## 2021-07-30 NOTE — TELEPHONE ENCOUNTER
----- Message from Brown Lion MD sent at 7/30/2021  8:46 AM CDT -----  Please call pt    Vitamin D is low and make sure pt is taking her Vitamin D once a week    On her cMP her kidney function shows her CR going up from 0.99 to 3.08 and GFR down from 69 to 19. Pt has an acute kidney injury. I recommend a repeat of her renal panel and I will order. She will likely need to see a Nephrologist in the future. Have pt come by lab soon to get this repeated.      On lipid panel LDL is high at 125. Please verify if pt is still taking her lipitor 80 mg PO qhs. If she is then go up to crestor 10 mg PO qhs give 30 pills and 3 refills     Hga1c is normal    On CBC hemoglobin at 11.6 from 10.8.  it has improved and continue with iron supplements     Recheck on next visit thanks

## 2021-08-02 ENCOUNTER — TELEPHONE (OUTPATIENT)
Dept: FAMILY MEDICINE CLINIC | Facility: CLINIC | Age: 66
End: 2021-08-02

## 2021-08-02 NOTE — TELEPHONE ENCOUNTER
----- Message from Brown Lion MD sent at 8/1/2021  4:54 AM CDT -----  Please let pt know chest x-ray shows no active disease    Recheck on next visit thanks

## 2021-08-23 RX ORDER — METOPROLOL SUCCINATE 25 MG/1
25 TABLET, EXTENDED RELEASE ORAL DAILY
Qty: 30 TABLET | Refills: 3 | Status: SHIPPED | OUTPATIENT
Start: 2021-08-23 | End: 2021-12-23 | Stop reason: SDUPTHER

## 2021-11-24 RX ORDER — ATORVASTATIN CALCIUM 80 MG/1
80 TABLET, FILM COATED ORAL DAILY
Qty: 30 TABLET | Refills: 0 | Status: SHIPPED | OUTPATIENT
Start: 2021-11-24 | End: 2021-12-27

## 2021-11-24 RX ORDER — LISINOPRIL AND HYDROCHLOROTHIAZIDE 25; 20 MG/1; MG/1
2 TABLET ORAL DAILY
Qty: 60 TABLET | Refills: 0 | Status: SHIPPED | OUTPATIENT
Start: 2021-11-24 | End: 2021-12-27

## 2021-12-23 ENCOUNTER — OFFICE VISIT (OUTPATIENT)
Dept: FAMILY MEDICINE CLINIC | Facility: CLINIC | Age: 66
End: 2021-12-23

## 2021-12-23 VITALS
HEART RATE: 74 BPM | HEIGHT: 68 IN | SYSTOLIC BLOOD PRESSURE: 98 MMHG | WEIGHT: 242 LBS | TEMPERATURE: 98.6 F | OXYGEN SATURATION: 98 % | DIASTOLIC BLOOD PRESSURE: 46 MMHG | BODY MASS INDEX: 36.68 KG/M2

## 2021-12-23 DIAGNOSIS — R73.03 PREDIABETES: ICD-10-CM

## 2021-12-23 DIAGNOSIS — Z72.0 TOBACCO USER: ICD-10-CM

## 2021-12-23 DIAGNOSIS — E66.01 CLASS 2 SEVERE OBESITY WITH SERIOUS COMORBIDITY AND BODY MASS INDEX (BMI) OF 36.0 TO 36.9 IN ADULT, UNSPECIFIED OBESITY TYPE (HCC): ICD-10-CM

## 2021-12-23 DIAGNOSIS — Z23 NEED FOR PNEUMOCOCCAL VACCINE: ICD-10-CM

## 2021-12-23 DIAGNOSIS — E55.9 VITAMIN D DEFICIENCY: ICD-10-CM

## 2021-12-23 DIAGNOSIS — N18.2 ACUTE RENAL FAILURE SUPERIMPOSED ON STAGE 2 CHRONIC KIDNEY DISEASE, UNSPECIFIED ACUTE RENAL FAILURE TYPE (HCC): ICD-10-CM

## 2021-12-23 DIAGNOSIS — I10 ESSENTIAL HYPERTENSION: ICD-10-CM

## 2021-12-23 DIAGNOSIS — D50.9 IRON DEFICIENCY ANEMIA, UNSPECIFIED IRON DEFICIENCY ANEMIA TYPE: ICD-10-CM

## 2021-12-23 DIAGNOSIS — Z86.73 HISTORY OF CVA (CEREBROVASCULAR ACCIDENT): ICD-10-CM

## 2021-12-23 DIAGNOSIS — Z12.31 SCREENING MAMMOGRAM, ENCOUNTER FOR: Primary | ICD-10-CM

## 2021-12-23 DIAGNOSIS — N17.9 ACUTE RENAL FAILURE SUPERIMPOSED ON STAGE 2 CHRONIC KIDNEY DISEASE, UNSPECIFIED ACUTE RENAL FAILURE TYPE (HCC): ICD-10-CM

## 2021-12-23 DIAGNOSIS — E78.2 MIXED HYPERLIPIDEMIA: ICD-10-CM

## 2021-12-23 DIAGNOSIS — Z23 NEED FOR IMMUNIZATION AGAINST INFLUENZA: ICD-10-CM

## 2021-12-23 PROCEDURE — 90686 IIV4 VACC NO PRSV 0.5 ML IM: CPT | Performed by: FAMILY MEDICINE

## 2021-12-23 PROCEDURE — 90732 PPSV23 VACC 2 YRS+ SUBQ/IM: CPT | Performed by: FAMILY MEDICINE

## 2021-12-23 PROCEDURE — 90472 IMMUNIZATION ADMIN EACH ADD: CPT | Performed by: FAMILY MEDICINE

## 2021-12-23 PROCEDURE — 90471 IMMUNIZATION ADMIN: CPT | Performed by: FAMILY MEDICINE

## 2021-12-23 PROCEDURE — 99214 OFFICE O/P EST MOD 30 MIN: CPT | Performed by: FAMILY MEDICINE

## 2021-12-23 RX ORDER — AMLODIPINE BESYLATE 10 MG/1
10 TABLET ORAL DAILY
Qty: 30 TABLET | Refills: 3 | Status: SHIPPED | OUTPATIENT
Start: 2021-12-23 | End: 2022-08-03

## 2021-12-23 RX ORDER — FUROSEMIDE 40 MG/1
40 TABLET ORAL DAILY
Qty: 30 TABLET | Refills: 3 | Status: SHIPPED | OUTPATIENT
Start: 2021-12-23 | End: 2022-05-03 | Stop reason: SDUPTHER

## 2021-12-23 RX ORDER — CYCLOBENZAPRINE HCL 5 MG
5 TABLET ORAL 3 TIMES DAILY PRN
Qty: 90 TABLET | Refills: 2 | Status: SHIPPED | OUTPATIENT
Start: 2021-12-23 | End: 2022-03-24

## 2021-12-23 RX ORDER — ALBUTEROL SULFATE 90 UG/1
1 AEROSOL, METERED RESPIRATORY (INHALATION) EVERY 4 HOURS PRN
Qty: 18 G | Refills: 3 | Status: SHIPPED | OUTPATIENT
Start: 2021-12-23 | End: 2021-12-27

## 2021-12-23 RX ORDER — POTASSIUM CHLORIDE 750 MG/1
10 CAPSULE, EXTENDED RELEASE ORAL DAILY
Qty: 30 CAPSULE | Refills: 3 | Status: SHIPPED | OUTPATIENT
Start: 2021-12-23

## 2021-12-23 RX ORDER — METOPROLOL SUCCINATE 25 MG/1
25 TABLET, EXTENDED RELEASE ORAL DAILY
Qty: 30 TABLET | Refills: 3 | Status: SHIPPED | OUTPATIENT
Start: 2021-12-23 | End: 2021-12-23

## 2021-12-23 RX ORDER — FERROUS SULFATE 325(65) MG
325 TABLET ORAL
Qty: 30 TABLET | Refills: 3 | Status: SHIPPED | OUTPATIENT
Start: 2021-12-23 | End: 2022-08-03

## 2021-12-23 RX ORDER — ERGOCALCIFEROL 1.25 MG/1
50000 CAPSULE ORAL WEEKLY
Qty: 4 CAPSULE | Refills: 3 | Status: SHIPPED | OUTPATIENT
Start: 2021-12-23 | End: 2022-05-03 | Stop reason: SDUPTHER

## 2021-12-23 RX ORDER — PANTOPRAZOLE SODIUM 40 MG/1
40 TABLET, DELAYED RELEASE ORAL DAILY
Qty: 30 TABLET | Refills: 6 | Status: SHIPPED | OUTPATIENT
Start: 2021-12-23 | End: 2022-10-17

## 2021-12-23 NOTE — PATIENT INSTRUCTIONS
Stop toprol XL 25 mg daily due to low blood pressure      Continue lisinopril-HCTZ and norvasc for blood pressure    Bring blood pressure readings next visit on paper    Recheck in 6 weeks     Get labwork before next visit especially kidneys    Will get mammogram screening

## 2021-12-23 NOTE — PROGRESS NOTES
Injection  Injection performed in Left and RightDeltoid by Margarita Gilman MA. Patient tolerated the procedure well without complications.  12/23/21   Margarita Gilman MA

## 2021-12-27 RX ORDER — LISINOPRIL AND HYDROCHLOROTHIAZIDE 25; 20 MG/1; MG/1
2 TABLET ORAL DAILY
Qty: 60 TABLET | Refills: 0 | Status: SHIPPED | OUTPATIENT
Start: 2021-12-27 | End: 2022-01-24

## 2021-12-27 RX ORDER — CLOPIDOGREL BISULFATE 75 MG/1
75 TABLET ORAL DAILY
Qty: 30 TABLET | Refills: 5 | Status: SHIPPED | OUTPATIENT
Start: 2021-12-27 | End: 2022-06-22

## 2021-12-27 RX ORDER — ATORVASTATIN CALCIUM 80 MG/1
80 TABLET, FILM COATED ORAL DAILY
Qty: 30 TABLET | Refills: 0 | Status: SHIPPED | OUTPATIENT
Start: 2021-12-27 | End: 2022-01-24

## 2022-01-24 RX ORDER — LISINOPRIL AND HYDROCHLOROTHIAZIDE 25; 20 MG/1; MG/1
2 TABLET ORAL DAILY
Qty: 60 TABLET | Refills: 1 | Status: SHIPPED | OUTPATIENT
Start: 2022-01-24 | End: 2022-04-25 | Stop reason: SDUPTHER

## 2022-01-24 RX ORDER — ATORVASTATIN CALCIUM 80 MG/1
80 TABLET, FILM COATED ORAL DAILY
Qty: 30 TABLET | Refills: 1 | Status: SHIPPED | OUTPATIENT
Start: 2022-01-24 | End: 2022-06-22 | Stop reason: SDUPTHER

## 2022-01-24 NOTE — TELEPHONE ENCOUNTER
Rx Refill Note  Requested Prescriptions     Pending Prescriptions Disp Refills   • lisinopril-hydrochlorothiazide (PRINZIDE,ZESTORETIC) 20-25 MG per tablet [Pharmacy Med Name: LISINOPRIL-HCTZ 20/25MG TABLETS] 60 tablet 1     Sig: TAKE 2 TABLETS BY MOUTH DAILY   • atorvastatin (LIPITOR) 80 MG tablet [Pharmacy Med Name: ATORVASTATIN 80MG TABLETS] 30 tablet 1     Sig: TAKE 1 TABLET BY MOUTH DAILY      Last office visit with prescribing clinician: 12/23/2021      Next office visit with prescribing clinician: Visit date not found   {TIP  Encounters:  DR LEAVITT PT.  LAST GFR DONE ON 7/28/21 AND WAS 19. RENAL PANEL ORDERED BUT PT DID NOT HAVE LABS DRAWN.     ACE Inhibitors Protocol Failed 01/23/2022 05:58 AM   Protocol Details  GFR greater than 30 mL/min in past 12 months          {TIP  Please add Last Relevant Lab Date if appropriate  {TIP  Is Refill Pharmacy correct? YES  Lily Shelotn LPN  01/24/22, 15:02 CST

## 2022-03-24 RX ORDER — CYCLOBENZAPRINE HCL 5 MG
TABLET ORAL
Qty: 90 TABLET | Refills: 2 | Status: SHIPPED | OUTPATIENT
Start: 2022-03-24 | End: 2022-06-22

## 2022-04-25 RX ORDER — LISINOPRIL AND HYDROCHLOROTHIAZIDE 25; 20 MG/1; MG/1
2 TABLET ORAL DAILY
Qty: 60 TABLET | Refills: 1 | Status: SHIPPED | OUTPATIENT
Start: 2022-04-25 | End: 2022-05-03

## 2022-04-25 NOTE — TELEPHONE ENCOUNTER
Rx Refill Note  Requested Prescriptions     Pending Prescriptions Disp Refills   • lisinopril-hydrochlorothiazide (PRINZIDE,ZESTORETIC) 20-25 MG per tablet [Pharmacy Med Name: LISINOPRIL-HCTZ 20/25MG TABLETS] 60 tablet 1     Sig: TAKE 2 TABLETS BY MOUTH DAILY      Last office visit with prescribing clinician: 12/23/21      Next office visit with prescribing clinician: not scheduled            Lizzie Kennedy MA  04/25/22, 09:17 CDT

## 2022-05-02 NOTE — PROGRESS NOTES
Subjective:  Nathaly Licea is a 66 y.o. female who presents for       Patient Active Problem List   Diagnosis   • Encounter for screening for diabetes mellitus   • Encounter for screening mammogram for malignant neoplasm of breast   • Encounter for screening for malignant neoplasm of colon   • Encounter for gynecological examination   • History of brain tumor   • Encounter for screening for endocrine disorder   • Encounter for screening for cardiovascular disorders   • Tobacco user   • Need for hepatitis C screening test   • Tobacco abuse counseling   • Cerebrovascular accident (CVA) (HCC)   • Need for immunization against influenza   • Chronic low back pain with bilateral sciatica   • Chronic back pain   • Right hemiparesis (HCC)   • Slurring of speech   • Hyperlipidemia   • Insomnia   • Constipation   • Iron deficiency anemia   • Hepatitis C antibody positive in blood   • Gait instability   • History of CVA (cerebrovascular accident)   • Essential hypertension   • Depression   • Deep vein thrombosis (DVT) of left upper extremity (HCC)   • Need for hepatitis vaccination   • History of cardiac arrest   • Gastroesophageal reflux disease   • Obesity   • Chronic hepatitis C without hepatic coma (HCC)   • Encounter for Papanicolaou smear of cervix   • Anemia   • General medical examination   • Irritable bowel syndrome with constipation   • Prediabetes   • Vitamin D deficiency   • Encounter for screening colonoscopy   • Screening mammogram, encounter for   • Stage 3 chronic kidney disease (HCC)   • Thoracic arthritis   • Arthritis, lumbar spine   • Ecchymosis   • Class 2 severe obesity with serious comorbidity and body mass index (BMI) of 37.0 to 37.9 in adult (HCC)   • Chronic bilateral low back pain with bilateral sciatica   • COPD with exacerbation (HCC)   • Acute pulmonary edema (HCC)   • Pulmonary hypertension (HCC)   • Former smoker   • Pulmonary nodule   • Former heavy tobacco smoker   • Class 2 severe  obesity with serious comorbidity and body mass index (BMI) of 36.0 to 36.9 in adult (HCC)   • Acute renal failure superimposed on stage 2 chronic kidney disease (HCC)   • Acute renal failure superimposed on chronic kidney disease (HCC)           Current Outpatient Medications:   •  amLODIPine (NORVASC) 10 MG tablet, Take 1 tablet by mouth Daily., Disp: 30 tablet, Rfl: 3  •  Anoro Ellipta 62.5-25 MCG/INH aerosol powder  inhaler, INL 1 PUFF PO QD, Disp: , Rfl:   •  aspirin  MG tablet, Take 1 tablet by mouth Daily., Disp: 30 tablet, Rfl: 6  •  atorvastatin (LIPITOR) 80 MG tablet, TAKE 1 TABLET BY MOUTH DAILY, Disp: 30 tablet, Rfl: 1  •  clopidogrel (PLAVIX) 75 MG tablet, TAKE 1 TABLET BY MOUTH DAILY, Disp: 30 tablet, Rfl: 5  •  cyclobenzaprine (FLEXERIL) 5 MG tablet, TAKE 1 TABLET BY MOUTH THREE TIMES DAILY AS NEEDED FOR MUSCLE SPASMS, Disp: 90 tablet, Rfl: 2  •  ferrous sulfate (FeroSul) 325 (65 FE) MG tablet, Take 1 tablet by mouth Daily With Breakfast., Disp: 30 tablet, Rfl: 3  •  gabapentin (NEURONTIN) 300 MG capsule, TK 1 C PO QHS, Disp: , Rfl:   •  HYDROcodone-acetaminophen (NORCO) 7.5-325 MG per tablet, TK 1 T PO TID, Disp: , Rfl:   •  linaclotide (Linzess) 145 MCG capsule capsule, Take 1 capsule by mouth Every Other Day., Disp: 30 capsule, Rfl: 6  •  nicotine (Nicotine Step 1) 21 MG/24HR patch, Place 1 patch on the skin as directed by provider Daily., Disp: 28 patch, Rfl: 3  •  pantoprazole (Protonix) 40 MG EC tablet, Take 1 tablet by mouth Daily., Disp: 30 tablet, Rfl: 6  •  potassium chloride (MICRO-K) 10 MEQ CR capsule, Take 1 capsule by mouth Daily., Disp: 30 capsule, Rfl: 3  •  ProAir  (90 Base) MCG/ACT inhaler, INHALE 1 PUFF BY MOUTH EVERY 4 HOURS AS NEEDED FOR WHEEZING, Disp: 18 g, Rfl: 3  •  vitamin D (ERGOCALCIFEROL) 1.25 MG (71752 UT) capsule capsule, Take 1 capsule by mouth 1 (One) Time Per Week., Disp: 4 capsule, Rfl: 3    HPI          Pt is 65 yo female with management of  obesity, IBS constipation, history of CVA, history of meningioma sp removal,  former tobacco user, gait instability, high fall risk GERD, vitamin D defiicency, on chronic anticoagulation, HTN, HLP, CKD stage 2, history of DVT in left upper extremity,  IBS-C, insomnia, chronic hep C,  history of cardiac arrest, prediabetes, iron deficiency anemia, subclinical hyperthryoidsim, history of cocaine use, benign thyroid nodules,  Degenerative changes of lumbar spine, or anterior wedging of T11 and T12, internal hemorrhoids, COPD, pulmonary hypertension, pulmonary edema, pulmonary nodule      12/23/21 in office visit for recheck on pt's above medical issues.. pt continues to take her medications for HTN, HLP, history of CVA, chronic back pain, constipation,  COPD, GERD, vitamin D deficiency.  Pt had labwork done on 7/28/21 that showed lipid panel LDL at 125. CMP showed CR at 3.08 with GFR at 19 from 69.  CBC showed hemoglobin at 11.6.  She denies any chest pain no dizziness. BP is on lower side today. She is now with PM for her back pain     5/3/22 In office visit for recheck. Pt was recently at [facility] admitted from 4/27/22 to 4/29/22 for acute on chronic kidney injury, hypotesnion and mild low magnesium. She presented with weakness and her CR was at 5.  She wa started on IV fluids and her BP mediation was held. Nephrology was consulted and her CR improved. She was discharged in stable condition. She was having constipationt the time. Today her BP is high. She went back to taking lisinopril-HCTZ 20-25 mg daily but is not taking norvasc 10 mg daily. She states she feels better.  She is drinking more fluids.  No chest pain no dizziness. She continues to smoke about 3 cigarettes a day         Chronic Kidney Disease  This is a chronic problem. The current episode started more than 1 year ago. The problem occurs constantly. The problem has been rapidly worsening. Pertinent negatives include no abdominal pain, anorexia,  arthralgias, change in bowel habit, chest pain, chills, congestion, coughing, diaphoresis, fatigue, fever, headaches, joint swelling, myalgias, nausea, neck pain, numbness, rash, sore throat, vertigo, visual change, vomiting or weakness. Nothing aggravates the symptoms. She has tried nothing for the symptoms. The treatment provided no relief.   Nicotine Dependence  Presents for follow-up visit. The symptoms have been stable. She smokes < 1/2 a pack of cigarettes per day. Compliance with prior treatments has been variable.   Hyperlipidemia  This is a chronic problem. The current episode started more than 1 year ago. Recent lipid tests were reviewed and are variable. She has no history of chronic renal disease, diabetes, hypothyroidism, liver disease, obesity or nephrotic syndrome. Current antihyperlipidemic treatment includes statins. The current treatment provides moderate improvement of lipids. Risk factors for coronary artery disease include hypertension, a sedentary lifestyle, post-menopausal, stress, dyslipidemia and obesity.   Obesity   This is a chronic problem. The current episode started more than 1 year ago. The problem occurs constantly. The problem has been unchanged. Associated symptoms include arthralgias, coughing, fatigue, numbness and weakness. Pertinent negatives include no abdominal pain, anorexia, change in bowel habit, chest pain, chills, congestion, diaphoresis, fever, headaches, joint swelling, myalgias, nausea, neck pain, rash, sore throat, swollen glands, urinary symptoms, vertigo, visual change or vomiting. Nothing aggravates the symptoms. She has tried nothing for the symptoms. The treatment provided no relief.   Hypertension   This is a chronic problem. The current episode started more than 1 year ago. The problem is unchanged. The problem is controlled. Pertinent negatives include no anxiety, blurred vision, chest pain, headaches, malaise/fatigue, neck pain, orthopnea, palpitations or  shortness of breath. Risk factors for coronary artery disease include dyslipidemia, obesity, sedentary lifestyle and smoking/tobacco exposure. Past treatments include ACE inhibitors, diuretics and beta blockers. Current antihypertension treatment includes diuretics and beta blockers. The current treatment provides moderate improvement. Compliance problems include psychosocial issues.  There is no history of angina, kidney disease, CAD/MI, CVA, heart failure, left ventricular hypertrophy, PVD or retinopathy. There is no history of chronic renal disease, coarctation of the aorta, hyperaldosteronism, hypercortisolism, hyperparathyroidism, a hypertension causing med, pheochromocytoma, renovascular disease, sleep apnea or a thyroid problem    Review of Systems  Review of Systems   Constitutional: Positive for activity change and fatigue. Negative for appetite change, chills, diaphoresis and fever.   HENT: Negative for congestion, postnasal drip, rhinorrhea, sinus pressure, sinus pain, sneezing, sore throat, trouble swallowing and voice change.    Respiratory: Negative for cough, choking, chest tightness, shortness of breath, wheezing and stridor.    Cardiovascular: Negative for chest pain.   Gastrointestinal: Negative for diarrhea, nausea and vomiting.   Musculoskeletal: Positive for arthralgias and gait problem.   Neurological: Positive for weakness and numbness. Negative for headaches.   Psychiatric/Behavioral: Positive for decreased concentration.       Patient Active Problem List   Diagnosis   • Encounter for screening for diabetes mellitus   • Encounter for screening mammogram for malignant neoplasm of breast   • Encounter for screening for malignant neoplasm of colon   • Encounter for gynecological examination   • History of brain tumor   • Encounter for screening for endocrine disorder   • Encounter for screening for cardiovascular disorders   • Tobacco user   • Need for hepatitis C screening test   • Tobacco  abuse counseling   • Cerebrovascular accident (CVA) (HCC)   • Need for immunization against influenza   • Chronic low back pain with bilateral sciatica   • Chronic back pain   • Right hemiparesis (HCC)   • Slurring of speech   • Hyperlipidemia   • Insomnia   • Constipation   • Iron deficiency anemia   • Hepatitis C antibody positive in blood   • Gait instability   • History of CVA (cerebrovascular accident)   • Essential hypertension   • Depression   • Deep vein thrombosis (DVT) of left upper extremity (HCC)   • Need for hepatitis vaccination   • History of cardiac arrest   • Gastroesophageal reflux disease   • Obesity   • Chronic hepatitis C without hepatic coma (HCC)   • Encounter for Papanicolaou smear of cervix   • Anemia   • General medical examination   • Irritable bowel syndrome with constipation   • Prediabetes   • Vitamin D deficiency   • Encounter for screening colonoscopy   • Screening mammogram, encounter for   • Stage 3 chronic kidney disease (HCC)   • Thoracic arthritis   • Arthritis, lumbar spine   • Ecchymosis   • Class 2 severe obesity with serious comorbidity and body mass index (BMI) of 37.0 to 37.9 in adult (HCC)   • Chronic bilateral low back pain with bilateral sciatica   • COPD with exacerbation (HCC)   • Acute pulmonary edema (HCC)   • Pulmonary hypertension (HCC)   • Former smoker   • Pulmonary nodule   • Former heavy tobacco smoker   • Class 2 severe obesity with serious comorbidity and body mass index (BMI) of 36.0 to 36.9 in adult (HCC)   • Acute renal failure superimposed on stage 2 chronic kidney disease (HCC)   • Acute renal failure superimposed on chronic kidney disease (HCC)     Past Surgical History:   Procedure Laterality Date   • BRAIN SURGERY       Social History     Socioeconomic History   • Marital status: Legally    Tobacco Use   • Smoking status: Current Every Day Smoker     Packs/day: 0.25     Types: Cigarettes   • Smokeless tobacco: Never Used   • Tobacco comment:  2-5 cigarettes a day   Substance and Sexual Activity   • Alcohol use: Yes     Comment: once a month, wine or beer   • Drug use: No   • Sexual activity: Yes     Partners: Male     Birth control/protection: None     Family History   Problem Relation Age of Onset   • Alcohol abuse Other    • Diabetes Other    • Heart disease Other    • Hypertension Other    • COPD Other    • Alcohol abuse Mother    • Arthritis Mother    • COPD Mother    • Heart disease Mother    • Hypertension Mother    • Stroke Mother    • Diabetes Father    • Heart disease Father    • Diabetes Paternal Grandmother      No visits with results within 6 Month(s) from this visit.   Latest known visit with results is:   Lab on 07/28/2021   Component Date Value Ref Range Status   • WBC 07/28/2021 7.81  3.40 - 10.80 10*3/mm3 Final   • RBC 07/28/2021 4.26  3.77 - 5.28 10*6/mm3 Final   • Hemoglobin 07/28/2021 11.6 (A) 12.0 - 15.9 g/dL Final   • Hematocrit 07/28/2021 34.2  34.0 - 46.6 % Final   • MCV 07/28/2021 80.3  79.0 - 97.0 fL Final   • MCH 07/28/2021 27.2  26.6 - 33.0 pg Final   • MCHC 07/28/2021 33.9  31.5 - 35.7 g/dL Final   • RDW 07/28/2021 18.0 (A) 12.3 - 15.4 % Final   • RDW-SD 07/28/2021 51.4  37.0 - 54.0 fl Final   • MPV 07/28/2021 11.6  6.0 - 12.0 fL Final   • Platelets 07/28/2021 288  140 - 450 10*3/mm3 Final   • Neutrophil % 07/28/2021 39.6 (A) 42.7 - 76.0 % Final   • Lymphocyte % 07/28/2021 41.7  19.6 - 45.3 % Final   • Monocyte % 07/28/2021 9.2  5.0 - 12.0 % Final   • Eosinophil % 07/28/2021 8.3 (A) 0.3 - 6.2 % Final   • Basophil % 07/28/2021 0.9  0.0 - 1.5 % Final   • Immature Grans % 07/28/2021 0.3  0.0 - 0.5 % Final   • Neutrophils, Absolute 07/28/2021 3.09  1.70 - 7.00 10*3/mm3 Final   • Lymphocytes, Absolute 07/28/2021 3.26 (A) 0.70 - 3.10 10*3/mm3 Final   • Monocytes, Absolute 07/28/2021 0.72  0.10 - 0.90 10*3/mm3 Final   • Eosinophils, Absolute 07/28/2021 0.65 (A) 0.00 - 0.40 10*3/mm3 Final   • Basophils, Absolute 07/28/2021 0.07  0.00  - 0.20 10*3/mm3 Final   • Immature Grans, Absolute 07/28/2021 0.02  0.00 - 0.05 10*3/mm3 Final   • nRBC 07/28/2021 0.0  0.0 - 0.2 /100 WBC Final   • Glucose 07/28/2021 94  65 - 99 mg/dL Final   • BUN 07/28/2021 46 (A) 8 - 23 mg/dL Final   • Creatinine 07/28/2021 3.08 (A) 0.57 - 1.00 mg/dL Final   • Sodium 07/28/2021 135 (A) 136 - 145 mmol/L Final   • Potassium 07/28/2021 4.4  3.5 - 5.2 mmol/L Final   • Chloride 07/28/2021 100  98 - 107 mmol/L Final   • CO2 07/28/2021 19.2 (A) 22.0 - 29.0 mmol/L Final   • Calcium 07/28/2021 9.2  8.6 - 10.5 mg/dL Final   • Total Protein 07/28/2021 8.3  6.0 - 8.5 g/dL Final   • Albumin 07/28/2021 4.10  3.50 - 5.20 g/dL Final   • ALT (SGPT) 07/28/2021 11  1 - 33 U/L Final   • AST (SGOT) 07/28/2021 12  1 - 32 U/L Final   • Alkaline Phosphatase 07/28/2021 97  39 - 117 U/L Final   • Total Bilirubin 07/28/2021 0.2  0.0 - 1.2 mg/dL Final   • eGFR   Amer 07/28/2021 19 (A) >60 mL/min/1.73 Final   • Globulin 07/28/2021 4.2  gm/dL Final   • A/G Ratio 07/28/2021 1.0  g/dL Final   • BUN/Creatinine Ratio 07/28/2021 14.9  7.0 - 25.0 Final   • Anion Gap 07/28/2021 15.8 (A) 5.0 - 15.0 mmol/L Final   • Hemoglobin A1C 07/28/2021 5.27  4.80 - 5.60 % Final   • Total Cholesterol 07/28/2021 211 (A) 0 - 200 mg/dL Final   • Triglycerides 07/28/2021 142  0 - 150 mg/dL Final   • HDL Cholesterol 07/28/2021 61 (A) 40 - 60 mg/dL Final   • LDL Cholesterol  07/28/2021 125 (A) 0 - 100 mg/dL Final   • VLDL Cholesterol 07/28/2021 25  5 - 40 mg/dL Final   • LDL/HDL Ratio 07/28/2021 1.99   Final   • 25 Hydroxy, Vitamin D 07/28/2021 18.1  ng/ml Final   • Iron 07/28/2021 72  37 - 145 mcg/dL Final   • Iron Saturation 07/28/2021 22  20 - 50 % Final   • Transferrin 07/28/2021 215  200 - 360 mg/dL Final   • TIBC 07/28/2021 320  298 - 536 mcg/dL Final   • Ferritin 07/28/2021 411.00 (A) 13.00 - 150.00 ng/mL Final      Two hour EEG with video  This result has an attachment that is not available.  Patient Name: Shavon  Stat  Medical Record Number: [id number]    Report No: [id number]  Referring Physician: Riya Weaver MD-  :  [id number]     Age:  62 y                                         Gender:  Female    PROCEDURE: Continuous EEG monitoring with video    Patient Clinical Information   Reason for Study: 62 year old female presents with altered mental status         Patient State: Asleep       Primary neurological diagnosis: Altered mental status          Primary indication for monitoring: Altered Mental Status            Pertinent Medications and Treatments  Medications: Fentanyl           Sedatives administered: no  Intubated: NO  Pharmacological paralytic: NO    Current Reporting Day  Start: 2017 4:10:01 PM   End: 2017  05:10:27 AM    EEG Description    Background:      Occipital rhythm (posterior dominant rhythm, or PDR): Absent    Organization:        Reactivity to eye opening/closure: Fair  Other background activity:   Intermixed with theta and delta activity   Drowsiness: Present- normal  Sleep: stage 2 - normal  Comments:            Technical and Activation Procedures:  Hyperventilation: Not Done                                      Photic stimulation: Not Done                                      Reactivity to stimulation: Yes    Abnormalities:    I. Seizures? No                II. Rhythmic or Periodic Patterns? No                                          III. Other Abnormalities? Yes       1. Near continuous irregular  delta activity in the left frontal region (   F7>F3).  2. Generalized slow activity theta> delta with rare  generalized volage   attenuation 1-2 secs of period.  3. Absent PDR     Summary    EEG Diagnosis: Abnormal EEG because of:     1. Near continuous irregular  delta activity in the left frontal  region.  2. Generalized slow activity with rare  generalized volage attenuation for   1-2 secs.  3. Absent PDR    Clinical Interpretation:   This EEG is consistent with left frontal   dysfunction  in the setting of mild to moderate generalized non-specific   cerebral dysfunction. No seizure were noted.    Notable changes compared to prior recording:  n/a  --                        ____________________________  MAME Thomason  Epilepsy Fellow    I have personally reviewed the tracing and edited and approved the report   above.    ____________________________  Elle Ryan M.D.  Attending Physician  Adult Transthoracic Echo Complete W/ Cont if Necessary Per Protocol  This result has an attachment that is not available.  Cardiac event monitor  This result has an attachment that is not available.  CT interpretation of outside films     CT WAS PERFORMED OF THE HEAD WITHOUT CONTRAST AT AN OUTSIDE INSTITUTION     AND SCANNED INTO PACS FOR INTERPRETATION       HISTORY: Mass       COMPARISON: None       INTRAVENOUS CONTRAST ADMINISTRATION (milliliters of Omnipaque 350):     None       DOSE REPORT: Total DLP (survey+Helical): 1007.4 mGy*cm       FINDINGS/     IMPRESSION:     A 2 cm left inferior frontal mass is fairly densely calcified centrally     and is associated with hyperostosis of the subjacent planum     sphenoidale. It is probably a meningioma. There is a very large amount     of echogenic edema associated with this lesion.       There is an area of low-attenuation in the central kendra measuring 11 mm     which could be due to an old infarction or previous osmotic     demyelination. There are old infarcts in the left basal ganglia.     Scattered areas of low-attenuation in the cerebral hemispheric white     matter are indicative of advanced chronic ischemic small vessel white     matter disease.       Electronically signed by FALLON Clifton MD on 7/2/2017 8:06 PM  CT angiogram head with and without & neck with contrast     CTA HEAD AND NECK       HISTORY: BRAIN MASS. VISION CHANGES       COMPARISON: Head CT, outside institution, 7/2/2017 at 1:06 PM       TECHNIQUE: CT angiography of the head and neck was  "performed following     the intravenous administration of IV contrast with axial images as well     as coronal and sagittal reformatted MIP images provided.       INTRAVENOUS CONTRAST ADMINISTRATION (milliliters of Omnipaque 350): 39       DOSE REPORT: Total DLP (survey+Helical): 1190 mGy*cm       FINDINGS:     CT angiography neck:     The origins of the great vessels are widely patent.     The common and internal carotid arteries are without appreciable     stenosis or finding of dissection.     The vertebral artery origins are not well seen. The remainder of the     vertebral arteries are unremarkable.       Note is made of prominent lymph nodes in the upper mediastinum     including the pretracheal space and prevascular space. There are also     prominent bilateral subpectoral lymph nodes. The thyroid gland is     mildly diffusely enlarged.       There is multilevel cervical spine degenerative change with multilevel     central canal stenosis of the mid to lower cervical spine due to     combination of congenitally short pedicles and posterior disc     osteophyte complexes.       CT angiography head:     The large planum sphenoidale presumed meningioma is again noted.     There is a focal moderate to severe stenosis of the P1 branch of the     left PCA.       The lateral aspect of the transverse sinuses is small bilaterally.       The pituitary sella is large and \"empty. \"       IMPRESSION:     1. 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     2. Intracranial findings raise the possibility of idiopathic     intracranial hypertension.     3. Focal proximal left PCA stenosis.     4. Incidentally noted prominent upper mediastinal and bilateral     subpectoral lymph nodes.       Electronically signed by FALLON Clifton MD on 7/2/2017 9:37 PM  CT chest abdomen pelvis w contrast     CT OF THE CHEST, ABDOMEN, AND PELVIS       HISTORY: BRAIN MASS. BRAIN MASS       COMPARISON: None       TECHNIQUE: Helical CT was performed " of the chest abdomen and pelvis     from the thoracic inlet through the lesser trochanters following the     intravenous administration of 99 ml Omnipaque 350. Dedicated coronal     and sagittal reformats were provided.       INTRAVENOUS CONTRAST ADMINISTRATION (milliliters of Omnipaque 350): 99     mL       DOSE REPORT: Total DLP (survey+Helical): 1190 mGy*cm       FINDINGS:       Chest:       Visualized atelectasis/scarring in the left lingula with surrounding     groundglass opacity. Otherwise lungs are clear. There is no pleural     effusion or pneumothorax. The heart size is normal. There is no     pericardial effusion. Atherosclerotic calcification is seen within the     coronary system.       There is enlargement of the main, left and right pulmonary arteries     measuring up to 3.5 cm in diameter. Findings are nonspecific but can be     seen in pulmonary arterial hypertension.       The chest wall and lower neck are unremarkable. The aortic arch is     intact.       Abdomen and pelvis:       Small simple appearing exophytic cyst arising off the lateral mid pole     right kidney. The liver, spleen, pancreas, adrenal glands, and kidneys     have a normal enhanced appearance.       Layering punctate gallstones in the gallbladder. There is no evidence     of cholecystitis.       Tthe bowel is normal in course and caliber without obstruction. The     appendix is normal.       There is no free fluid or free intraperitoneal air.       The uterus and adnexa are unremarkable.       There is a tiny fat-containing supraumbilical hernia.       BONES: Minimal degenerative changes of the thoracolumbar spine, most     notable at L5-S1. The visualized bones are unremarkable. Minimal wedge     deformity is demonstrated involving the T12, T11 likely physiologic.     Clinical correlation with focal tenderness recommended.       IMPRESSION:     1. No findings of acute abnormality in the chest, abdomen, pelvis or      thoracolumbar spine. Specifically, there is no mass or abnormal lymph     node or evidence of metastatic disease.     2. Mild enlargement of the pulmonary arteries. Finding can be seen in     pulmonary arterial hypertension.     3. Nonspecific atelectasis/scarring in the lingula with groundglass     opacities. Findings could represent infectious or inflammatory     etiology.     4. Mild wedge deformity of T11 and T12. Clinical correlation with focal     point tenderness is recommended.     5. Other incidental findings including a tiny fat-containing     supraumbilical hernia, punctate gallstones in gallbladder without     cholecystitis, and a small simple appearing exophytic cysts of the     right kidney. Please see above for additional details.       Electronically signed by Ilya Lara on 7/2/2017 9:46 PM       I, Florin Bradley, have reviewed the images and verify the above     interpretation on 7/3/2017 6:45 AM.       Electronically signed by Florin Bradley on 7/3/2017 6:45 AM  MRI brain with and without contrast     MRI Brain       Technique: MR imaging of the brain was performed before and after     contrast administration.       IV Contrast: 12 mL Gadavist.       Clinical information: BRAIN MASS. ABNORMAL CT.       Comparison: Outside head CT 7/2/2017, CTA head and neck at [facility]     7/2/2017..       Findings:     There is a 1 cm area of restricted diffusion in the right     periventricular frontal white matter. There is extensive     periventricular T-2/flair hyperintense signal abnormality in a pattern     typical of chronic ischemic small is white matter disease. There are     also numerous remote lacunar infarcts in the periventricular white     matter, the basal ganglia and central kendra. There is also an old     infarction of the anterior body of the corpus callosum extending into     the pericallosal white matter bilaterally.       The dome-shaped dural based extra-axial mass along the planum      "sphenoidale demonstrates homogeneous enhancement, with \"blistering\" of     the underlying bone, and has marked associated vasogenic edema     throughout the left frontal lobe.       There are probably 6-8 old microhemorrhages, mostly in the basal     ganglia and thalamus.       The large \"empty\" sella is again noted.       Impression:       1. Small acute right frontal white matter infarction. A critical alert     was sent.     2. Large planum meningioma with extensive vasogenic edema.     3. Extensive chronic ischemic small vessel white matter disease and     multiple old lacunar infarcts. Multiple old parenchymal     microhemorrhages in a pattern suggestive of hypertension.       Electronically signed by FALLON Clifton MD on 7/3/2017 7:09 AM  X-ray AP chest portable     STUDY: CHEST PORTABLE       HISTORY: PRE-OP EXAM - CRANIOTOMY. PRE-OP EXAM - CRANIOTOMY TEAM INTERN     PAGER: [phone]\  \GFR:>60 [id number];EGFRAA:>60     [id number];\  \\       COMPARISON: CT chest 7/2/2017.       FINDINGS:       There is bibasilar atelectasis. No other abnormal focal consolidations,     pleural effusion, or pneumothorax. Cardiac silhouette appears enlarged,     but is exaggerated by low lung volumes there are no acute abnormalities     of the bones.       IMPRESSION:     Apparent cardiomegaly, exaggerated by low lung volumes. No evidence of     acute pulmonary disease.       Electronically signed by Nino Whitten on 7/6/2017 3:07 PM       Jeff DIAS, have reviewed the images and verify the above     interpretation on 7/6/2017 3:19 PM.       Electronically signed by Jeff Kolb on 7/6/2017 3:19 PM  MRI brain and orbits with and without contrast     EXAM: MRI BRAIN AND ORBITS WITH AND WITHOUT CONTRAST       HISTORY: TEAM INTERN PAGER: [phone]\  \GFR:>60     [id number];EGFRAA:>60 [id number];\  \\       COMPARISON: MRI brain from 7/2/17. CTA head and neck from 7/2/17.       TECHNIQUE: Multisequence, multiplanar " MRI imaging of the brain and     orbits was obtained before and after the intravenous demonstration of     10 mL of Gadavist.       FINDINGS:       Brain:       There is an approximately 6 mm of restricted diffusion in the     periaqueductal gray matter (image 20) just to the right of midline.     This is new since 7/2/17. As before, there is a small 1 cm region of     reduced diffusion in the right frontal periventricular white matter. No     additional new foci of restricted diffusion.       There is severe T2/FLAIR hyperintense signal periventricular white     matter chronic ischemic small vessel white matter disease.       Prior lacunar infarcts in the periventricular white matter, basal     ganglia, central kendra and corpus callosum with extension into the     periventricular white matter as before.       Foci of susceptibility effect within the basal ganglia, thalamus, and     left midbrain related to old microhemorrhages, unchanged.       Empty sella as before.       No abnormal extra-axial collection. No hydrocephalus. The focal     stenosis of the P1 branch of the left PCA is seen to better advantage     on the CTA neck from 7/2/17. Otherwise, the major vascular flow voids     are normal.       T1 and T2 isointense enhancing dural based lesion along the planum     sphenoidale and associated vasogenic edema in the left frontal lobe is     unchanged.       Paranasal sinuses and mastoid air cells are clear.       Orbits:       Both globes appear normal. Both optic nerves and optic chiasm appear     normal. The intra-and extraconal orbital compartment appears normal. No     untoward contrast enhancement.       IMPRESSION:     1. New acute infarction in the periaqueductal gray matter to the the     right of midline since 7/2/17. A critical alert was sent.     2. Unchanged region of restricted diffusion in the right frontal white     matter related to the infarction seen on 7/2/17.     3. Unchanged planum  meningioma with associated vasogenic edema.     4. Severe chronic ischemic small vessel white matter disease, multiple     old lacunar infarcts, empty sella and multiple old parenchymal     microhemorrhages as before.     5. Normal MRI orbits.       Electronically signed by Tolu Berger MD on 7/7/2017 8:40 AM       I, Gio Purcell MD, have reviewed the images and verify the above     interpretation on 7/7/2017 8:47 AM.       Electronically signed by Gio Purcell MD on 7/7/2017 8:47 AM  MRI brain with and without contrast     TYPE OF STUDY: MRI of the Brain with and without contrast       COMPARISON: July 7, 2017       HISTORY: Interval craniotomy for mass resection       TECHNIQUE: Multiplanar Multisequence MR images were obtained of the     brain with and without contrast using 10 mL Gadavist intravenous     contrast.       FINDINGS:     Small foci of restricted diffusion in the right frontal periventricular     white matter and in the right posterior kendra appear similar to the     previous exam.       There has been interval left frontal craniotomy for extra-axial mass     resection. There is restricted diffusion in the medial and inferior     left frontal lobe adjacent to the operative site, likely postoperative     with some blood products noted in this location. Extra-axial hemorrhage     underlying the craniotomy measures approximately 12 mm in thickness     with mild associated mass effect including mass effect upon the     anterior horn the left lateral ventricle, mildly increased. There is     also small amount of extra-axial hemorrhage along the floor of the left     anterior cranial fossa. There is no definite residual neoplasm.     Extensive left frontal edema and additional scattered chronic vessel     ischemic white matter changes appear similar to the previous exam.     There are several scattered old lacunar infarcts of the basal ganglia     and corona radiata. There are also small old infarcts  of the kendra.       IMPRESSION:     1. Interval left frontal craniotomy for mass resection. Extra-axial     hematoma underlying the craniotomy measures approximately 12 mm in     thickness with mild associated mass effect, increased from prior. Left     frontal lobe edema has not significantly changed.     2. Small recent infarcts in the right frontal lobe and posterior right     kendra are unchanged.     3. Multiple scattered old lacunar infarcts and chronic small vessel     ischemic white matter changes       Electronically signed by Nhung Munoz MD on 7/8/2017 9:12 AM  CT angiogram head with and without & neck with contrast  Narrative: CT HEAD, CTA HEAD AND NECK, 11/16/2017 at 12:26 PM    HISTORY: Stroke. Altered mental status. Slurred speech.    COMPARISON: None.    TECHNIQUE: Axial images were obtained through the head without IV   contrast. Spiral images are obtained from the top of the aortic arch   through the neck and head to the vertex during IV administration of   contrast.   Axial reconstructed images were generated. Thin section   and 3-D MIP reformatted images of the head and neck are generated.    INTRAVENOUS CONTRAST ADMINISTRATION (mL Omnipaque 350): 54    DOSE REPORT (Total DLP mGy*cm): 1301    FINDINGS: Head CT shows moderate chronic ischemic small vessel   degenerative white matter changes. Mild/moderate diffuse atrophy. No   hydrocephalus. Old left caudate lacunar infarct. Chronic appearing   pontine infarcts.    The patient is status post left frontal craniotomy, appearing to   involve the left frontal sinus. Old left lamina papyracea fracture.   Thickened planum sphenoidale could reflect some residual intraosseous   meningioma. The bony sella appears large, appearing filled   predominantly with CSF. Midline pituitary stalk. Prominent   intracranial atherosclerotic arterial calcifications.    The proximal great vessels appear preserved. No great vessel origin   stenosis. Normal contours of  the cervical carotid and cervical   vertebral arteries. Moderate tortuosity of the right cervical ICA.    No sharp intracranial arterial cutoffs identified. The basilar artery   appears to enhance normally.  Impression: 1. Head CT shows no acute disease. Multiple old infarcts.  2. No significant cervical arterial stenosis. No evidence of   dissection.  3. No visible acute intracranial arterial pathology.  4. Old left frontal craniotomy is noted to involve the frontal sinus.   Thickened planum sphenoidale could reflect residual intraosseous   meningioma.  5. Multiple carious teeth.    IMPRESSION:        Electronically signed by  Vaughn Magdaleno MD on 11/16/2017 12:58 PM  CXR - Portable  Narrative: EXAMINATION: CHEST PORTABLE    HISTORY: Stroke        COMPARISON: None    TECHNIQUE: Portable AP chest    FINDINGS AND   Impression: ET tube and nasogastric tube appear well-positioned.    The heart size is within normal limits.  No pulmonary edema.    No focal parenchymal consolidation, pleural effusion, or   pneumothorax.  The trachea appears patent and is appropriately   positioned.  No acute fracture.    Electronically signed by  Norman Martino MD on 11/16/2017 12:59 PM  X-ray chest portable  Narrative: PORTABLE CHEST RADIOGRAPH    History: Line Placement        Comparison: Chest one view 11/16/2017 12:27 PM    Findings:     A single portable view of the chest was obtained.    Interval placement of right jugular approach central venous catheter   with tip in the right atrium.  Endotracheal tube and enteric tube are unchanged in position.  Defibrillator pads now overlie the chest obscuring underlying detail.  No pneumothorax or pleural effusion.  Mild prominence of the central pulmonary vasculature without osiris   pulmonary edema.  Bony structures are unchanged.  Heart and mediastinal contours are unchanged.  Impression: Impression:   1.  Placement of right jugular approach central venous catheter.  2.  No  pneumothorax.  3.  Otherwise, unchanged examination.    Electronically signed by  Jeffrey Rush on 11/16/2017 3:49 PM  X-ray AP chest portable  Narrative: History:  Shortness Of Breath     Study: CHEST PORTABLE; 11/17/2017 7:32 AM    Comparison: 11/16/2017    ALIAS: STAT JUSTICIA.    A semierect view of the chest shows the endotracheal tube, enteric   tube and right internal jugular catheter in stable position. The   lungs remain clear and moderately well expanded. Mediastinal contours   are stable.  Impression: Satisfactory placement of support devices. No acute disease.    Electronically signed by  Jose Guadalupe Araiza on 11/17/2017 8:32 AM  Ultrasound doppler upper extemity venous left  Narrative: LEFT UPPER EXTREMITY VENOUS DOPPLER, 11/18/2017    INDICATION:  Left upper extremity swelling    COMPARISON: None    TECHNIQUE: Multiple grayscale, color Doppler and pulsed wave, and   spectral doppler images of the left internal jugular (limited   visualized portions), subclavian, axillary, brachial, basilic and   cephalic veins were obtained.    FINDINGS:  There is patency of the limited visualized portion of left internal   jugular vein, left subclavian vein, and left axillary vein. There is   occlusive or nearly occlusive clot in the left brachial veins, left   basilic vein, and left cephalic vein.  Impression: There is occlusive or nearly occlusive clot in the left brachial   veins, left basilic vein, and left cephalic veins.    COMMUNICATION: A critical alert was successfully sent by Dr. Rigoberto Conti on 11/18/2017 at 2:19 AM.    Electronically signed by  Drake Skelton MD on 11/18/2017 9:26 AM  MRI brain without contrast  Narrative: MRI BRAIN WITHOUT CONTRAST    HISTORY: Neuro Deficit(S), Subacute    COMPARISON: CTA head and neck, 11/16/2017    TECHNIQUE: Multiplanar, multisequence MR imaging was performed of the   brain without contrast.    FINDINGS:    Patient motion limits evaluation.    There are  postoperative changes of left frontal craniotomy.  A thin   left frontal convexity extra-axial fluid collection is present   beneath the craniotomy flap.    There is marked two small foci of suspected restricted diffusion   within the right parietal periventricular white matter with mild   corresponding T2 hyperintense signal.  It is difficult to reliably   determine if these are hypointense on the ADC map, however this is   suspected, and the setting of patient motion.    There are multiple patchy and confluent T2 hyperintense foci within   the subcortical, centrum semiovale, and periventricular white matter   as well as the kendra which are nonspecific but likely reflect moderate   chronic ischemic small vessel white matter degenerative changes in a   patient of this age.  Additionally there is more confluent T2   hyperintense signal within the inferior left frontal lobe near the   site of prior left frontal craniotomy, likely representing gliosis.    A small focus of susceptibility effect is present within the right   frontal corona radiata likely representing a remote microhemorrhage.    Additionally there are a few small foci of susceptibility effect   within the left thalamus and kendra, also likely representing sites of   remote microhemorrhage.    A small remote infarct is present within the central kendra.  Small   remote left caudate head lacunar infarct.  Additional small bilateral   corona radiata and basal ganglia lacunar infarcts.    There is focal atrophy of the anterior aspect of the body of the   corpus callosum.  There is a somewhat empty sella appearance.    There is mild diffuse mucosal thickening of the paranasal sinuses   most pronounced within the posterior right ethmoid air cells.  There   is a remote left lamina papyracea fracture.  Mild bilateral mastoid   air cell fluid.  Impression: 1.  Suboptimal examination due to patient motion.    2.  Questionable acute to subacute small infarct about the  "right   parietal periventricular white matter as described above.  Small   bilateral corona radiata, basal ganglia, and pontine remote lacunar   infarcts.  A critical alert was sent.    3.  Moderate patchy and confluent T2 hyperintense white matter signal   is nonspecific but likely represents chronic ischemic small vessel   white matter degenerative changes as opposed to sequela from prior   infectious/inflammatory, or demyelinating insult.    4.  Few scattered remote microhemorrhages as described above.    5.  Postoperative changes of left frontal craniotomy with suspected   gliosis in the inferior left frontal lobe.    Electronically signed by  Jimy Ibrahim on 11/18/2017 7:30 AM    @GOS@  Immunization History   Administered Date(s) Administered   • Flu Vaccine Quad PF 6-35MO 09/27/2017, 09/27/2017   • Flu Vaccine Quad PF >36MO 09/27/2017   • FluLaval/Fluarix/Fluzone >6 09/10/2020, 12/23/2021   • Flucelvax Quad Vial =>4yrs 10/31/2019   • Hepatitis A 12/15/2017   • Hepatitis B Vaccine Adult IM 12/15/2017, 12/15/2017, 01/26/2018, 01/26/2018   • Influenza, Unspecified 10/31/2019   • Pneumococcal Polysaccharide (PPSV23) 12/23/2021       The following portions of the patient's history were reviewed and updated as appropriate: allergies, current medications, past family history, past medical history, past social history, past surgical history and problem list.        Physical Exam  /96 (BP Location: Right arm, Patient Position: Sitting, Cuff Size: Large Adult)   Pulse 66   Temp 97.7 °F (36.5 °C)   Ht 172.7 cm (68\")   Wt 104 kg (229 lb 3.2 oz)   SpO2 98%   BMI 34.85 kg/m²     Physical Exam  Vitals and nursing note reviewed.   Constitutional:       Appearance: She is well-developed. She is not diaphoretic.   HENT:      Head: Normocephalic and atraumatic.      Right Ear: External ear normal.   Eyes:      Conjunctiva/sclera: Conjunctivae normal.      Pupils: Pupils are equal, round, and reactive " to light.   Cardiovascular:      Rate and Rhythm: Normal rate and regular rhythm.      Heart sounds: Normal heart sounds. No murmur heard.  Pulmonary:      Effort: Pulmonary effort is normal. No respiratory distress.      Breath sounds: Normal breath sounds.   Abdominal:      General: Bowel sounds are normal. There is no distension.      Palpations: Abdomen is soft.      Tenderness: There is no abdominal tenderness.      Comments: Obese abdomen    Musculoskeletal:         General: Tenderness present. No deformity. Normal range of motion.      Cervical back: Normal range of motion and neck supple.   Skin:     General: Skin is warm.      Coloration: Skin is not pale.      Findings: No erythema or rash.   Neurological:      Mental Status: She is alert and oriented to person, place, and time.      Cranial Nerves: No cranial nerve deficit.   Psychiatric:         Behavior: Behavior normal.         Assessment/Plan    Diagnosis Plan   1. Encounter for cervical Pap smear with pelvic exam  Ambulatory Referral to Obstetrics / Gynecology   2. Vitamin D deficiency  CBC Auto Differential    Comprehensive Metabolic Panel    Hemoglobin A1c    Lipid Panel    TSH    T4, Free    Vitamin D 25 Hydroxy    Vitamin B12    Iron Profile    Magnesium    Ferritin   3. Stage 3a chronic kidney disease (HCC)  CBC Auto Differential    Comprehensive Metabolic Panel    Hemoglobin A1c    Lipid Panel    TSH    T4, Free    Vitamin D 25 Hydroxy    Vitamin B12    Iron Profile    Magnesium    Ferritin   4. Prediabetes  CBC Auto Differential    Comprehensive Metabolic Panel    Hemoglobin A1c    Lipid Panel    TSH    T4, Free    Vitamin D 25 Hydroxy    Vitamin B12    Iron Profile    Magnesium    Ferritin   5. Iron deficiency anemia, unspecified iron deficiency anemia type  CBC Auto Differential    Comprehensive Metabolic Panel    Hemoglobin A1c    Lipid Panel    TSH    T4, Free    Vitamin D 25 Hydroxy    Vitamin B12    Iron Profile    Magnesium    Ferritin    6. History of CVA (cerebrovascular accident)  CBC Auto Differential    Comprehensive Metabolic Panel    Hemoglobin A1c    Lipid Panel    TSH    T4, Free    Vitamin D 25 Hydroxy    Vitamin B12    Iron Profile    Magnesium    Ferritin   7. Gastroesophageal reflux disease, unspecified whether esophagitis present  CBC Auto Differential    Comprehensive Metabolic Panel    Hemoglobin A1c    Lipid Panel    TSH    T4, Free    Vitamin D 25 Hydroxy    Vitamin B12    Iron Profile    Magnesium    Ferritin   8. Essential hypertension  CBC Auto Differential    Comprehensive Metabolic Panel    Hemoglobin A1c    Lipid Panel    TSH    T4, Free    Vitamin D 25 Hydroxy    Vitamin B12    Iron Profile    Magnesium    Ferritin   9. Acute renal failure superimposed on chronic kidney disease, unspecified CKD stage, unspecified acute renal failure type (HCC)  CBC Auto Differential    Comprehensive Metabolic Panel    Hemoglobin A1c    Lipid Panel    TSH    T4, Free    Vitamin D 25 Hydroxy    Vitamin B12    Iron Profile    Magnesium    Ferritin   10. Hypomagnesemia  CBC Auto Differential    Comprehensive Metabolic Panel    Hemoglobin A1c    Lipid Panel    TSH    T4, Free    Vitamin D 25 Hydroxy    Vitamin B12    Iron Profile    Magnesium    Ferritin   11. Screening mammogram, encounter for  Mammo Screening Bilateral With CAD         -recommend labwork   -recommend mammogram - schedule at imaging center   -recommend shingles vaccination -gave prescription   -recommnend pap smear - will refer to OB/GYN   -recommend COVID-19 vaccination   - acute on chronic kidney diseae  - check CMP. Advised pt to make appt with Nephrologist   -COPD/COPD exacerbation/pulmonary HTN/ pulmonary edema- Pulmonology following on Stioloto.  -pulmonary nodule/lymph node in lung - adivsed pt to followup with Pulmonologist soon and gt repeat CT of chest in December 2020   -vitamin D deficiency - vitamin D once a week  -hypertension  - stop lisionpril-HCTZ start back on  norvasc 10 mg daily.   -tobacco user - recommend 1 800 QUIT Now and nicotine patches counseled quit smoking >5 minutes   -chronic back pain/mild degnerative changes of lumbar spine - PM following  Was on cymbalta 60 mg PO q dailyi. Has been on tylenol PRN.  Continue on tramadol 50 mg every 8 hours PRN.  -gait instability/high fall risk - pt uses cane to ambulate   -chronic hep C - continue to monitor   -iron deficiency anemia - pt on iron supplements. Recommend  Possible Hematology referral for possible iron IV therapy .   -GERD - on protonix 40 mg PO q daily.    -history of CVA /gait instability/slurring of speech/history of brain tumor -Neurology following currently stable  On aspirin 81 mg daily, on plavix 72 mg daily.   -hyperlipidemia - stable lipitor 80 mg PO qhs recheck lipid panel DASH diet/heart healthy diet    -constipation/IBS  - stable on linzess 145 mcg daily.    -obesity -  counseled weight loss >5 miinutes  BMI at 36.80   -advised pt to be safe and call with questions and concerns  -advised to go to ER or call 911 if symptoms worrisome or severe  -advised to followup with Specialist and referrals  -advised pt to be safe during COVID-19 pandemic  I spent 30 minutes caring for Nathaly on this date of service. This time includes time spent by me in the following activities: preparing for the visit, reviewing tests, obtaining and/or reviewing a separately obtained history, performing a medically appropriate examination and/or evaluation, counseling and educating the patient/family/caregiver, ordering medications, tests, or procedures, referring and communicating with other health care professionals, documenting information in the medical record, independently interpreting results and communicating that information with the patient/family/caregiver and care coordination  -recheck in 4 weeks         This document has been electronically signed by Brown Lion MD on May 3, 2022 09:08 CDT

## 2022-05-03 ENCOUNTER — OFFICE VISIT (OUTPATIENT)
Dept: FAMILY MEDICINE CLINIC | Facility: CLINIC | Age: 67
End: 2022-05-03

## 2022-05-03 VITALS
HEART RATE: 66 BPM | DIASTOLIC BLOOD PRESSURE: 96 MMHG | BODY MASS INDEX: 34.74 KG/M2 | WEIGHT: 229.2 LBS | SYSTOLIC BLOOD PRESSURE: 148 MMHG | TEMPERATURE: 97.7 F | OXYGEN SATURATION: 98 % | HEIGHT: 68 IN

## 2022-05-03 DIAGNOSIS — E55.9 VITAMIN D DEFICIENCY: ICD-10-CM

## 2022-05-03 DIAGNOSIS — I10 ESSENTIAL HYPERTENSION: ICD-10-CM

## 2022-05-03 DIAGNOSIS — N17.9 ACUTE RENAL FAILURE SUPERIMPOSED ON CHRONIC KIDNEY DISEASE, UNSPECIFIED CKD STAGE, UNSPECIFIED ACUTE RENAL FAILURE TYPE: ICD-10-CM

## 2022-05-03 DIAGNOSIS — Z86.73 HISTORY OF CVA (CEREBROVASCULAR ACCIDENT): ICD-10-CM

## 2022-05-03 DIAGNOSIS — N18.9 ACUTE RENAL FAILURE SUPERIMPOSED ON CHRONIC KIDNEY DISEASE, UNSPECIFIED CKD STAGE, UNSPECIFIED ACUTE RENAL FAILURE TYPE: ICD-10-CM

## 2022-05-03 DIAGNOSIS — R73.03 PREDIABETES: ICD-10-CM

## 2022-05-03 DIAGNOSIS — Z12.31 SCREENING MAMMOGRAM, ENCOUNTER FOR: ICD-10-CM

## 2022-05-03 DIAGNOSIS — D50.9 IRON DEFICIENCY ANEMIA, UNSPECIFIED IRON DEFICIENCY ANEMIA TYPE: ICD-10-CM

## 2022-05-03 DIAGNOSIS — Z01.419 ENCOUNTER FOR CERVICAL PAP SMEAR WITH PELVIC EXAM: Primary | ICD-10-CM

## 2022-05-03 DIAGNOSIS — E83.42 HYPOMAGNESEMIA: ICD-10-CM

## 2022-05-03 DIAGNOSIS — K21.9 GASTROESOPHAGEAL REFLUX DISEASE, UNSPECIFIED WHETHER ESOPHAGITIS PRESENT: ICD-10-CM

## 2022-05-03 DIAGNOSIS — N18.31 STAGE 3A CHRONIC KIDNEY DISEASE: ICD-10-CM

## 2022-05-03 PROCEDURE — 99214 OFFICE O/P EST MOD 30 MIN: CPT | Performed by: FAMILY MEDICINE

## 2022-05-03 RX ORDER — ERGOCALCIFEROL 1.25 MG/1
50000 CAPSULE ORAL WEEKLY
Qty: 4 CAPSULE | Refills: 3 | Status: SHIPPED | OUTPATIENT
Start: 2022-05-03

## 2022-05-03 RX ORDER — FUROSEMIDE 40 MG/1
40 TABLET ORAL DAILY
Qty: 30 TABLET | Refills: 3 | Status: SHIPPED | OUTPATIENT
Start: 2022-05-03 | End: 2022-05-03

## 2022-05-03 NOTE — PATIENT INSTRUCTIONS
Bring blood pressure readings next visit    Monitor BP at home.    Stop lisinopril-HCTZ and start on norvasc 10 mg daily    Will get mammogram    Will refer to OB/GYN for pap smear    Call  kidney doctor for an appt    Get labwork    Recheck in 1 month

## 2022-06-22 RX ORDER — ATORVASTATIN CALCIUM 80 MG/1
80 TABLET, FILM COATED ORAL DAILY
Qty: 30 TABLET | Refills: 3 | Status: SHIPPED | OUTPATIENT
Start: 2022-06-22 | End: 2022-09-15 | Stop reason: SDUPTHER

## 2022-06-22 RX ORDER — CLOPIDOGREL BISULFATE 75 MG/1
75 TABLET ORAL DAILY
Qty: 30 TABLET | Refills: 3 | Status: SHIPPED | OUTPATIENT
Start: 2022-06-22 | End: 2022-09-15 | Stop reason: SDUPTHER

## 2022-06-22 RX ORDER — CYCLOBENZAPRINE HCL 5 MG
TABLET ORAL
Qty: 90 TABLET | Refills: 2 | Status: SHIPPED | OUTPATIENT
Start: 2022-06-22 | End: 2022-09-15 | Stop reason: SDUPTHER

## 2022-08-03 RX ORDER — AMLODIPINE BESYLATE 10 MG/1
10 TABLET ORAL DAILY
Qty: 30 TABLET | Refills: 3 | Status: SHIPPED | OUTPATIENT
Start: 2022-08-03 | End: 2023-02-06

## 2022-08-03 RX ORDER — FERROUS SULFATE 325(65) MG
1 TABLET ORAL
Qty: 30 TABLET | Refills: 3 | Status: SHIPPED | OUTPATIENT
Start: 2022-08-03 | End: 2023-01-09

## 2022-08-03 NOTE — TELEPHONE ENCOUNTER
Rx Refill Note  Requested Prescriptions     Pending Prescriptions Disp Refills   • amLODIPine (NORVASC) 10 MG tablet [Pharmacy Med Name: AMLODIPINE BESYLATE 10MG TABLETS] 30 tablet 3     Sig: TAKE 1 TABLET BY MOUTH DAILY   • FeroSul 325 (65 Fe) MG tablet [Pharmacy Med Name: FERROUS SULFATE 325MG (5GR) TABS] 30 tablet 3     Sig: TAKE 1 TABLET BY MOUTH DAILY WITH BREAKFAST      Last office visit with prescribing clinician: 5/3/2022      Next office visit with prescribing clinician: Visit date not found   {TIP  Encounters:     Calcium-Channel Blockers Protocol Failed 08/03/2022 08:55 AM   Protocol Details  Normal serum potassium in past 12 months    GFR> 30 ml/min in past 12 months            {TIP  Please add Last Relevant Lab Date if appropriate  {TIP  Is Refill Pharmacy correct? yes  Lily Shelton LPN  08/03/22, 09:19 CDT

## 2022-09-12 ENCOUNTER — LAB (OUTPATIENT)
Dept: LAB | Facility: HOSPITAL | Age: 67
End: 2022-09-12

## 2022-09-12 DIAGNOSIS — R73.03 PREDIABETES: ICD-10-CM

## 2022-09-12 DIAGNOSIS — K21.9 GASTROESOPHAGEAL REFLUX DISEASE, UNSPECIFIED WHETHER ESOPHAGITIS PRESENT: ICD-10-CM

## 2022-09-12 DIAGNOSIS — D50.9 IRON DEFICIENCY ANEMIA, UNSPECIFIED IRON DEFICIENCY ANEMIA TYPE: ICD-10-CM

## 2022-09-12 DIAGNOSIS — N18.9 ACUTE RENAL FAILURE SUPERIMPOSED ON CHRONIC KIDNEY DISEASE, UNSPECIFIED CKD STAGE, UNSPECIFIED ACUTE RENAL FAILURE TYPE: ICD-10-CM

## 2022-09-12 DIAGNOSIS — E83.42 HYPOMAGNESEMIA: ICD-10-CM

## 2022-09-12 DIAGNOSIS — E55.9 VITAMIN D DEFICIENCY: ICD-10-CM

## 2022-09-12 DIAGNOSIS — N18.31 STAGE 3A CHRONIC KIDNEY DISEASE: ICD-10-CM

## 2022-09-12 DIAGNOSIS — Z86.73 HISTORY OF CVA (CEREBROVASCULAR ACCIDENT): ICD-10-CM

## 2022-09-12 DIAGNOSIS — I10 ESSENTIAL HYPERTENSION: ICD-10-CM

## 2022-09-12 DIAGNOSIS — N17.9 ACUTE RENAL FAILURE SUPERIMPOSED ON CHRONIC KIDNEY DISEASE, UNSPECIFIED CKD STAGE, UNSPECIFIED ACUTE RENAL FAILURE TYPE: ICD-10-CM

## 2022-09-12 LAB
25(OH)D3 SERPL-MCNC: 22.4 NG/ML (ref 30–100)
ALBUMIN SERPL-MCNC: 4 G/DL (ref 3.5–5.2)
ALBUMIN/GLOB SERPL: 1.1 G/DL
ALP SERPL-CCNC: 96 U/L (ref 39–117)
ALT SERPL W P-5'-P-CCNC: 10 U/L (ref 1–33)
ANION GAP SERPL CALCULATED.3IONS-SCNC: 10.4 MMOL/L (ref 5–15)
AST SERPL-CCNC: 9 U/L (ref 1–32)
BASOPHILS # BLD AUTO: 0.05 10*3/MM3 (ref 0–0.2)
BASOPHILS NFR BLD AUTO: 0.8 % (ref 0–1.5)
BILIRUB SERPL-MCNC: <0.2 MG/DL (ref 0–1.2)
BUN SERPL-MCNC: 15 MG/DL (ref 8–23)
BUN/CREAT SERPL: 18.3 (ref 7–25)
CALCIUM SPEC-SCNC: 9.8 MG/DL (ref 8.6–10.5)
CHLORIDE SERPL-SCNC: 105 MMOL/L (ref 98–107)
CHOLEST SERPL-MCNC: 204 MG/DL (ref 0–200)
CO2 SERPL-SCNC: 28.6 MMOL/L (ref 22–29)
CREAT SERPL-MCNC: 0.82 MG/DL (ref 0.57–1)
DEPRECATED RDW RBC AUTO: 54 FL (ref 37–54)
EGFRCR SERPLBLD CKD-EPI 2021: 80 ML/MIN/1.73
EOSINOPHIL # BLD AUTO: 0.32 10*3/MM3 (ref 0–0.4)
EOSINOPHIL NFR BLD AUTO: 5.4 % (ref 0.3–6.2)
ERYTHROCYTE [DISTWIDTH] IN BLOOD BY AUTOMATED COUNT: 18.1 % (ref 12.3–15.4)
FERRITIN SERPL-MCNC: 332 NG/ML (ref 13–150)
GLOBULIN UR ELPH-MCNC: 3.8 GM/DL
GLUCOSE SERPL-MCNC: 114 MG/DL (ref 65–99)
HBA1C MFR BLD: 5.2 % (ref 4.8–5.6)
HCT VFR BLD AUTO: 37.6 % (ref 34–46.6)
HDLC SERPL-MCNC: 88 MG/DL (ref 40–60)
HGB BLD-MCNC: 12.2 G/DL (ref 12–15.9)
IMM GRANULOCYTES # BLD AUTO: 0.01 10*3/MM3 (ref 0–0.05)
IMM GRANULOCYTES NFR BLD AUTO: 0.2 % (ref 0–0.5)
IRON 24H UR-MRATE: 67 MCG/DL (ref 37–145)
IRON SATN MFR SERPL: 21 % (ref 20–50)
LDLC SERPL CALC-MCNC: 93 MG/DL (ref 0–100)
LDLC/HDLC SERPL: 1.01 {RATIO}
LYMPHOCYTES # BLD AUTO: 2.35 10*3/MM3 (ref 0.7–3.1)
LYMPHOCYTES NFR BLD AUTO: 39.8 % (ref 19.6–45.3)
MCH RBC QN AUTO: 27 PG (ref 26.6–33)
MCHC RBC AUTO-ENTMCNC: 32.4 G/DL (ref 31.5–35.7)
MCV RBC AUTO: 83.2 FL (ref 79–97)
MONOCYTES # BLD AUTO: 0.4 10*3/MM3 (ref 0.1–0.9)
MONOCYTES NFR BLD AUTO: 6.8 % (ref 5–12)
NEUTROPHILS NFR BLD AUTO: 2.77 10*3/MM3 (ref 1.7–7)
NEUTROPHILS NFR BLD AUTO: 47 % (ref 42.7–76)
NRBC BLD AUTO-RTO: 0.2 /100 WBC (ref 0–0.2)
PLATELET # BLD AUTO: 203 10*3/MM3 (ref 140–450)
PMV BLD AUTO: 11.4 FL (ref 6–12)
POTASSIUM SERPL-SCNC: 4.3 MMOL/L (ref 3.5–5.2)
PROT SERPL-MCNC: 7.8 G/DL (ref 6–8.5)
RBC # BLD AUTO: 4.52 10*6/MM3 (ref 3.77–5.28)
SODIUM SERPL-SCNC: 144 MMOL/L (ref 136–145)
T4 FREE SERPL-MCNC: 0.97 NG/DL (ref 0.93–1.7)
TIBC SERPL-MCNC: 320 MCG/DL (ref 298–536)
TRANSFERRIN SERPL-MCNC: 215 MG/DL (ref 200–360)
TRIGL SERPL-MCNC: 135 MG/DL (ref 0–150)
TSH SERPL DL<=0.05 MIU/L-ACNC: 0.64 UIU/ML (ref 0.27–4.2)
VIT B12 BLD-MCNC: 421 PG/ML (ref 211–946)
VLDLC SERPL-MCNC: 23 MG/DL (ref 5–40)
WBC NRBC COR # BLD: 5.9 10*3/MM3 (ref 3.4–10.8)

## 2022-09-12 PROCEDURE — 84443 ASSAY THYROID STIM HORMONE: CPT

## 2022-09-12 PROCEDURE — 83540 ASSAY OF IRON: CPT

## 2022-09-12 PROCEDURE — 82607 VITAMIN B-12: CPT

## 2022-09-12 PROCEDURE — 82728 ASSAY OF FERRITIN: CPT

## 2022-09-12 PROCEDURE — 82306 VITAMIN D 25 HYDROXY: CPT

## 2022-09-12 PROCEDURE — 83036 HEMOGLOBIN GLYCOSYLATED A1C: CPT

## 2022-09-12 PROCEDURE — 80053 COMPREHEN METABOLIC PANEL: CPT

## 2022-09-12 PROCEDURE — 84439 ASSAY OF FREE THYROXINE: CPT

## 2022-09-12 PROCEDURE — 84466 ASSAY OF TRANSFERRIN: CPT

## 2022-09-12 PROCEDURE — 85025 COMPLETE CBC W/AUTO DIFF WBC: CPT

## 2022-09-12 PROCEDURE — 80061 LIPID PANEL: CPT

## 2022-09-16 RX ORDER — CYCLOBENZAPRINE HCL 5 MG
5 TABLET ORAL 3 TIMES DAILY PRN
Qty: 90 TABLET | Refills: 2 | Status: SHIPPED | OUTPATIENT
Start: 2022-09-16

## 2022-09-16 RX ORDER — ATORVASTATIN CALCIUM 80 MG/1
80 TABLET, FILM COATED ORAL DAILY
Qty: 30 TABLET | Refills: 3 | Status: SHIPPED | OUTPATIENT
Start: 2022-09-16

## 2022-09-16 RX ORDER — CLOPIDOGREL BISULFATE 75 MG/1
75 TABLET ORAL DAILY
Qty: 30 TABLET | Refills: 3 | Status: SHIPPED | OUTPATIENT
Start: 2022-09-16

## 2022-10-17 RX ORDER — PANTOPRAZOLE SODIUM 40 MG/1
40 TABLET, DELAYED RELEASE ORAL DAILY
Qty: 30 TABLET | Refills: 0 | Status: SHIPPED | OUTPATIENT
Start: 2022-10-17 | End: 2023-01-04

## 2023-01-04 RX ORDER — METOPROLOL SUCCINATE 25 MG/1
25 TABLET, EXTENDED RELEASE ORAL DAILY
Qty: 30 TABLET | Refills: 3 | Status: SHIPPED | OUTPATIENT
Start: 2023-01-04

## 2023-01-04 RX ORDER — LINACLOTIDE 145 UG/1
CAPSULE, GELATIN COATED ORAL
Qty: 30 CAPSULE | Refills: 6 | Status: SHIPPED | OUTPATIENT
Start: 2023-01-04

## 2023-01-04 RX ORDER — PANTOPRAZOLE SODIUM 40 MG/1
40 TABLET, DELAYED RELEASE ORAL DAILY
Qty: 30 TABLET | Refills: 0 | Status: SHIPPED | OUTPATIENT
Start: 2023-01-04

## 2023-01-04 RX ORDER — LISINOPRIL AND HYDROCHLOROTHIAZIDE 25; 20 MG/1; MG/1
2 TABLET ORAL DAILY
Qty: 60 TABLET | Refills: 1 | Status: SHIPPED | OUTPATIENT
Start: 2023-01-04 | End: 2023-04-07 | Stop reason: SDUPTHER

## 2023-01-05 RX ORDER — PANTOPRAZOLE SODIUM 40 MG/1
40 TABLET, DELAYED RELEASE ORAL DAILY
Qty: 90 TABLET | OUTPATIENT
Start: 2023-01-05

## 2023-01-05 RX ORDER — LISINOPRIL AND HYDROCHLOROTHIAZIDE 25; 20 MG/1; MG/1
2 TABLET ORAL DAILY
Qty: 180 TABLET | OUTPATIENT
Start: 2023-01-05

## 2023-01-06 RX ORDER — ALBUTEROL SULFATE 90 UG/1
1 AEROSOL, METERED RESPIRATORY (INHALATION) EVERY 4 HOURS PRN
Qty: 18 G | Refills: 3 | Status: SHIPPED | OUTPATIENT
Start: 2023-01-06

## 2023-01-06 RX ORDER — LISINOPRIL AND HYDROCHLOROTHIAZIDE 25; 20 MG/1; MG/1
2 TABLET ORAL DAILY
Qty: 60 TABLET | Refills: 1 | OUTPATIENT
Start: 2023-01-06

## 2023-01-09 RX ORDER — FERROUS SULFATE 325(65) MG
TABLET ORAL
Qty: 30 TABLET | Refills: 3 | Status: SHIPPED | OUTPATIENT
Start: 2023-01-09

## 2023-02-06 RX ORDER — AMLODIPINE BESYLATE 10 MG/1
10 TABLET ORAL DAILY
Qty: 30 TABLET | Refills: 0 | Status: SHIPPED | OUTPATIENT
Start: 2023-02-06

## 2023-02-08 RX ORDER — AMLODIPINE BESYLATE 10 MG/1
10 TABLET ORAL DAILY
Qty: 90 TABLET | OUTPATIENT
Start: 2023-02-08

## 2023-04-07 RX ORDER — LISINOPRIL AND HYDROCHLOROTHIAZIDE 25; 20 MG/1; MG/1
2 TABLET ORAL DAILY
Qty: 60 TABLET | Refills: 0 | Status: SHIPPED | OUTPATIENT
Start: 2023-04-07

## 2023-04-07 NOTE — TELEPHONE ENCOUNTER
Rx Refill Note  Requested Prescriptions     Pending Prescriptions Disp Refills   • lisinopril-hydrochlorothiazide (PRINZIDE,ZESTORETIC) 20-25 MG per tablet [Pharmacy Med Name: LISINOPRIL-HCTZ 20/25MG TABLETS] 60 tablet 0     Sig: Take 2 tablets by mouth Daily. NO REFILLS WITHOUT AN APPOINTMENT      Last office visit with prescribing clinician: 5/3/2022   Last telemedicine visit with prescribing clinician: Visit date not found   Next office visit with prescribing clinician: Visit date not found   {TIP  Encounters:  PT HAS NOT BEEN SEEN SINCE 5/3/22    {TIP  Please add Last Relevant Lab Date if appropriate             {TIP  Is Refill Pharmacy correct? YES    Would you like a call back once the refill request has been completed: [] Yes [] No    If the office needs to give you a call back, can they leave a voicemail: [] Yes [] No    Lily Shelton LPN  04/07/23, 09:19 CDT

## 2023-07-06 PROBLEM — E66.9 CLASS 1 OBESITY WITH SERIOUS COMORBIDITY AND BODY MASS INDEX (BMI) OF 33.0 TO 33.9 IN ADULT: Status: ACTIVE | Noted: 2023-07-06

## 2023-07-06 PROBLEM — N18.2 STAGE 2 CHRONIC KIDNEY DISEASE: Status: ACTIVE | Noted: 2023-07-06

## 2023-08-16 NOTE — PROGRESS NOTES
Subjective:  Nathaly Licea is a 67 y.o. female who presents for       Patient Active Problem List   Diagnosis    Encounter for screening for diabetes mellitus    Encounter for screening mammogram for malignant neoplasm of breast    Encounter for screening for malignant neoplasm of colon    Encounter for gynecological examination    History of brain tumor    Encounter for screening for endocrine disorder    Encounter for screening for cardiovascular disorders    Tobacco user    Need for hepatitis C screening test    Tobacco abuse counseling    Cerebrovascular accident (CVA)    Need for immunization against influenza    Chronic low back pain with bilateral sciatica    Chronic back pain    Right hemiparesis    Slurring of speech    Hyperlipidemia    Insomnia    Constipation    Iron deficiency anemia    Hepatitis C antibody positive in blood    Gait instability    History of CVA (cerebrovascular accident)    Essential hypertension    Depression    Deep vein thrombosis (DVT) of left upper extremity    Need for hepatitis vaccination    History of cardiac arrest    Gastroesophageal reflux disease    Obesity    Chronic hepatitis C without hepatic coma    Encounter for Papanicolaou smear of cervix    Anemia    General medical examination    Irritable bowel syndrome with constipation    Prediabetes    Vitamin D deficiency    Encounter for screening colonoscopy    Screening mammogram, encounter for    Stage 3 chronic kidney disease    Thoracic arthritis    Arthritis, lumbar spine    Ecchymosis    Class 2 severe obesity with serious comorbidity and body mass index (BMI) of 37.0 to 37.9 in adult    Chronic bilateral low back pain with bilateral sciatica    COPD with exacerbation    Acute pulmonary edema    Pulmonary hypertension    Former smoker    Pulmonary nodule    Former heavy tobacco smoker    Class 2 severe obesity with serious comorbidity and body mass index (BMI) of 36.0 to 36.9 in adult    Acute renal failure  superimposed on stage 2 chronic kidney disease    Acute renal failure superimposed on chronic kidney disease    Class 1 obesity with serious comorbidity and body mass index (BMI) of 33.0 to 33.9 in adult    Stage 2 chronic kidney disease    History of prediabetes    Nicotine dependence with nicotine-induced disorder           Current Outpatient Medications:     albuterol sulfate HFA (ProAir HFA) 108 (90 Base) MCG/ACT inhaler, Inhale 1 puff Every 4 (Four) Hours As Needed for Wheezing., Disp: 18 g, Rfl: 3    amLODIPine (NORVASC) 10 MG tablet, TAKE 1 TABLET BY MOUTH DAILY, Disp: 90 tablet, Rfl: 0    Anoro Ellipta 62.5-25 MCG/INH aerosol powder  inhaler, INL 1 PUFF PO QD, Disp: , Rfl:     aspirin  MG tablet, Take 1 tablet by mouth Daily., Disp: 30 tablet, Rfl: 6    atorvastatin (LIPITOR) 80 MG tablet, Take 1 tablet by mouth Daily., Disp: 30 tablet, Rfl: 3    clopidogrel (PLAVIX) 75 MG tablet, Take 1 tablet by mouth Daily., Disp: 30 tablet, Rfl: 3    cyclobenzaprine (FLEXERIL) 5 MG tablet, Take 1 tablet by mouth 3 (Three) Times a Day As Needed for Muscle Spasms. for muscle spams, Disp: 90 tablet, Rfl: 2    ferrous sulfate (FeroSul) 325 (65 FE) MG tablet, Take 1 tablet by mouth Daily With Breakfast., Disp: 30 tablet, Rfl: 3    gabapentin (NEURONTIN) 300 MG capsule, TK 1 C PO QHS, Disp: , Rfl:     HYDROcodone-acetaminophen (NORCO) 7.5-325 MG per tablet, TK 1 T PO TID, Disp: , Rfl:     linaclotide (Linzess) 145 MCG capsule capsule, Take 1 capsule by mouth Every Other Day., Disp: 30 capsule, Rfl: 3    lisinopril-hydrochlorothiazide (PRINZIDE,ZESTORETIC) 20-25 MG per tablet, Take 2 tablets by mouth Daily., Disp: 60 tablet, Rfl: 0    metoprolol succinate XL (TOPROL-XL) 25 MG 24 hr tablet, Take 1 tablet by mouth Daily., Disp: 30 tablet, Rfl: 3    Nicotrol 10 MG inhaler, Inhale 1 puff As Needed for Smoking Cessation. Can use up to 8 times a day, Disp: 168 each, Rfl: 3    pantoprazole (PROTONIX) 40 MG EC tablet, TAKE 1  TABLET BY MOUTH DAILY, Disp: 90 tablet, Rfl: 0    potassium chloride (MICRO-K) 10 MEQ CR capsule, Take 1 capsule by mouth Daily., Disp: 30 capsule, Rfl: 3    vitamin D (ERGOCALCIFEROL) 1.25 MG (41581 UT) capsule capsule, Take 1 capsule by mouth 1 (One) Time Per Week., Disp: 4 capsule, Rfl: 3    HPI          Pt is 68 yo female with management of obesity, IBS constipation, history of CVA, history of meningioma sp removal,  former tobacco user, gait instability, high fall risk GERD, vitamin D defiicency, on chronic anticoagulation, HTN, HLP, CKD stage 2, history of DVT in left upper extremity,  IBS-C, insomnia, chronic hep C,  history of cardiac arrest, prediabetes, iron deficiency anemia, subclinical hyperthryoidsim, history of cocaine use, benign thyroid nodules,  Degenerative changes of lumbar spine, or anterior wedging of T11 and T12, internal hemorrhoids, COPD, pulmonary hypertension, pulmonary edema, pulmonary nodule      5/3/22 In office visit for recheck. Pt was recently at [facility] admitted from 4/27/22 to 4/29/22 for acute on chronic kidney injury, hypotesnion and mild low magnesium. She presented with weakness and her CR was at 5.  She wa started on IV fluids and her BP mediation was held. Nephrology was consulted and her CR improved. She was discharged in stable condition. She was having constipationt the time. Today her BP is high. She went back to taking lisinopril-HCTZ 20-25 mg daily but is not taking norvasc 10 mg daily. She states she feels better.  She is drinking more fluids.  No chest pain no dizziness. She continues to smoke about 3 cigarettes a day     7/6/23 in office visit for recheck. Pt is due for new labwork, DEXA scan, mammogram, pap smear. Her bP is elevated today. She has had difficulty in coming to appts. She smokes 1/3 ppd tobacco., She is only taking  her lisinpril-HCTZ 20/25 mg 2 tablets daily and not norvasc or metoprolol.      9/6/23 in office visit for recheck. Pt had labwork on 7/6/23  that showed O thyroid studies TSH is low and T4 normal pt has overactive thyroid or subclinical hypothyroidism,. Will need to monitor.  On CMP total protein elevated. GFR in 70s. Pt has CKD stage 2 On lipid panel LDL high at 116 and total cholesterol high. Continue on lipitor 80 mg PO qhs. Iron studies delores Vitamin D is normal. Recommend pt continue vitamin D once a week CBC shows stable hemoglobin and WBC. Hga1c normal and b12 normal . Her BP is high today and she ran out of lisinpril  40 mg daily. She continues to take norvsac 10 mg daily and toprol xL 25 mg daily. She has headache but no chest pain.         Chronic Kidney Disease  This is a chronic problem. The current episode started more than 1 year ago. The problem occurs constantly. The problem has been rapidly worsening. Pertinent negatives include no abdominal pain, anorexia, arthralgias, change in bowel habit, chest pain, chills, congestion, coughing, diaphoresis, fatigue, fever, headaches, joint swelling, myalgias, nausea, neck pain, numbness, rash, sore throat, vertigo, visual change, vomiting or weakness. Nothing aggravates the symptoms. She has tried nothing for the symptoms. The treatment provided no relief.   Nicotine Dependence  Presents for follow-up visit. The symptoms have been stable. She smokes < 1/2 a pack of cigarettes per day. Compliance with prior treatments has been variable.   Hyperlipidemia  This is a chronic problem. The current episode started more than 1 year ago. Recent lipid tests were reviewed and are variable. She has no history of chronic renal disease, diabetes, hypothyroidism, liver disease, obesity or nephrotic syndrome. Current antihyperlipidemic treatment includes statins. The current treatment provides moderate improvement of lipids. Risk factors for coronary artery disease include hypertension, a sedentary lifestyle, post-menopausal, stress, dyslipidemia and obesity.   Obesity   This is a chronic problem. The current  episode started more than 1 year ago. The problem occurs constantly. The problem has been unchanged. Associated symptoms include arthralgias, coughing, fatigue, numbness and weakness. Pertinent negatives include no abdominal pain, anorexia, change in bowel habit, chest pain, chills, congestion, diaphoresis, fever, headaches, joint swelling, myalgias, nausea, neck pain, rash, sore throat, swollen glands, urinary symptoms, vertigo, visual change or vomiting. Nothing aggravates the symptoms. She has tried nothing for the symptoms. The treatment provided no relief.   Hypertension   This is a chronic problem. The current episode started more than 1 year ago. The problem is unchanged. The problem is controlled. Pertinent negatives include no anxiety, blurred vision, chest pain, headaches, malaise/fatigue, neck pain, orthopnea, palpitations or shortness of breath. Risk factors for coronary artery disease include dyslipidemia, obesity, sedentary lifestyle and smoking/tobacco exposure. Past treatments include ACE inhibitors, diuretics and beta blockers. Current antihypertension treatment includes diuretics and beta blockers. The current treatment provides moderate improvement. Compliance problems include psychosocial issues.  There is no history of angina, kidney disease, CAD/MI, CVA, heart failure, left ventricular hypertrophy, PVD or retinopathy. There is no history of chronic renal disease, coarctation of the aorta, hyperaldosteronism, hypercortisolism, hyperparathyroidism, a hypertension causing med, pheochromocytoma, renovascular disease, sleep apnea or a thyroid problem    Review of Systems  Review of Systems   Constitutional:  Positive for activity change and fatigue. Negative for appetite change, chills, diaphoresis and fever.   HENT:  Negative for congestion, postnasal drip, rhinorrhea, sinus pressure, sinus pain, sneezing, sore throat, trouble swallowing and voice change.    Respiratory:  Negative for cough,  choking, chest tightness, shortness of breath, wheezing and stridor.    Cardiovascular:  Negative for chest pain.   Gastrointestinal:  Negative for diarrhea, nausea and vomiting.   Musculoskeletal:  Positive for arthralgias and gait problem.   Neurological:  Positive for weakness and numbness. Negative for headaches.   Psychiatric/Behavioral:  Positive for decreased concentration.      Patient Active Problem List   Diagnosis    Encounter for screening for diabetes mellitus    Encounter for screening mammogram for malignant neoplasm of breast    Encounter for screening for malignant neoplasm of colon    Encounter for gynecological examination    History of brain tumor    Encounter for screening for endocrine disorder    Encounter for screening for cardiovascular disorders    Tobacco user    Need for hepatitis C screening test    Tobacco abuse counseling    Cerebrovascular accident (CVA)    Need for immunization against influenza    Chronic low back pain with bilateral sciatica    Chronic back pain    Right hemiparesis    Slurring of speech    Hyperlipidemia    Insomnia    Constipation    Iron deficiency anemia    Hepatitis C antibody positive in blood    Gait instability    History of CVA (cerebrovascular accident)    Essential hypertension    Depression    Deep vein thrombosis (DVT) of left upper extremity    Need for hepatitis vaccination    History of cardiac arrest    Gastroesophageal reflux disease    Obesity    Chronic hepatitis C without hepatic coma    Encounter for Papanicolaou smear of cervix    Anemia    General medical examination    Irritable bowel syndrome with constipation    Prediabetes    Vitamin D deficiency    Encounter for screening colonoscopy    Screening mammogram, encounter for    Stage 3 chronic kidney disease    Thoracic arthritis    Arthritis, lumbar spine    Ecchymosis    Class 2 severe obesity with serious comorbidity and body mass index (BMI) of 37.0 to 37.9 in adult    Chronic bilateral  low back pain with bilateral sciatica    COPD with exacerbation    Acute pulmonary edema    Pulmonary hypertension    Former smoker    Pulmonary nodule    Former heavy tobacco smoker    Class 2 severe obesity with serious comorbidity and body mass index (BMI) of 36.0 to 36.9 in adult    Acute renal failure superimposed on stage 2 chronic kidney disease    Acute renal failure superimposed on chronic kidney disease    Class 1 obesity with serious comorbidity and body mass index (BMI) of 33.0 to 33.9 in adult    Stage 2 chronic kidney disease    History of prediabetes    Nicotine dependence with nicotine-induced disorder     Past Surgical History:   Procedure Laterality Date    BRAIN SURGERY       Social History     Socioeconomic History    Marital status: Legally    Tobacco Use    Smoking status: Every Day     Packs/day: 0.25     Types: Cigarettes    Smokeless tobacco: Never    Tobacco comments:     2-5 cigarettes a day   Vaping Use    Vaping Use: Never used   Substance and Sexual Activity    Alcohol use: Yes     Comment: once a month, wine or beer    Drug use: No    Sexual activity: Yes     Partners: Male     Birth control/protection: None     Family History   Problem Relation Age of Onset    Alcohol abuse Other     Diabetes Other     Heart disease Other     Hypertension Other     COPD Other     Alcohol abuse Mother     Arthritis Mother     COPD Mother     Heart disease Mother     Hypertension Mother     Stroke Mother     Diabetes Father     Heart disease Father     Diabetes Paternal Grandmother      Lab on 07/06/2023   Component Date Value Ref Range Status    WBC 07/06/2023 5.69  3.40 - 10.80 10*3/mm3 Final    RBC 07/06/2023 4.29  3.77 - 5.28 10*6/mm3 Final    Hemoglobin 07/06/2023 12.1  12.0 - 15.9 g/dL Final    Hematocrit 07/06/2023 35.0  34.0 - 46.6 % Final    MCV 07/06/2023 81.6  79.0 - 97.0 fL Final    MCH 07/06/2023 28.2  26.6 - 33.0 pg Final    MCHC 07/06/2023 34.6  31.5 - 35.7 g/dL Final    RDW  07/06/2023 17.8 (H)  12.3 - 15.4 % Final    RDW-SD 07/06/2023 51.6  37.0 - 54.0 fl Final    MPV 07/06/2023 12.4 (H)  6.0 - 12.0 fL Final    Platelets 07/06/2023 253  140 - 450 10*3/mm3 Final    Neutrophil % 07/06/2023 50.3  42.7 - 76.0 % Final    Lymphocyte % 07/06/2023 37.6  19.6 - 45.3 % Final    Monocyte % 07/06/2023 7.2  5.0 - 12.0 % Final    Eosinophil % 07/06/2023 4.2  0.3 - 6.2 % Final    Basophil % 07/06/2023 0.5  0.0 - 1.5 % Final    Immature Grans % 07/06/2023 0.2  0.0 - 0.5 % Final    Neutrophils, Absolute 07/06/2023 2.86  1.70 - 7.00 10*3/mm3 Final    Lymphocytes, Absolute 07/06/2023 2.14  0.70 - 3.10 10*3/mm3 Final    Monocytes, Absolute 07/06/2023 0.41  0.10 - 0.90 10*3/mm3 Final    Eosinophils, Absolute 07/06/2023 0.24  0.00 - 0.40 10*3/mm3 Final    Basophils, Absolute 07/06/2023 0.03  0.00 - 0.20 10*3/mm3 Final    Immature Grans, Absolute 07/06/2023 0.01  0.00 - 0.05 10*3/mm3 Final    nRBC 07/06/2023 0.0  0.0 - 0.2 /100 WBC Final    Glucose 07/06/2023 117 (H)  65 - 99 mg/dL Final    BUN 07/06/2023 28 (H)  8 - 23 mg/dL Final    Creatinine 07/06/2023 0.86  0.57 - 1.00 mg/dL Final    Sodium 07/06/2023 140  136 - 145 mmol/L Final    Potassium 07/06/2023 3.8  3.5 - 5.2 mmol/L Final    Chloride 07/06/2023 105  98 - 107 mmol/L Final    CO2 07/06/2023 25.0  22.0 - 29.0 mmol/L Final    Calcium 07/06/2023 10.2  8.6 - 10.5 mg/dL Final    Total Protein 07/06/2023 8.6 (H)  6.0 - 8.5 g/dL Final    Albumin 07/06/2023 4.2  3.5 - 5.2 g/dL Final    ALT (SGPT) 07/06/2023 9  1 - 33 U/L Final    AST (SGOT) 07/06/2023 9  1 - 32 U/L Final    Alkaline Phosphatase 07/06/2023 76  39 - 117 U/L Final    Total Bilirubin 07/06/2023 <0.2  0.0 - 1.2 mg/dL Final    Globulin 07/06/2023 4.4  gm/dL Final    A/G Ratio 07/06/2023 1.0  g/dL Final    BUN/Creatinine Ratio 07/06/2023 32.6 (H)  7.0 - 25.0 Final    Anion Gap 07/06/2023 10.0  5.0 - 15.0 mmol/L Final    eGFR 07/06/2023 75.1  >60.0 mL/min/1.73 Final    Hemoglobin A1C 07/06/2023  5.50  4.80 - 5.60 % Final    Total Cholesterol 07/06/2023 217 (H)  0 - 200 mg/dL Final    Triglycerides 07/06/2023 148  0 - 150 mg/dL Final    HDL Cholesterol 07/06/2023 75 (H)  40 - 60 mg/dL Final    LDL Cholesterol  07/06/2023 116 (H)  0 - 100 mg/dL Final    VLDL Cholesterol 07/06/2023 26  5 - 40 mg/dL Final    LDL/HDL Ratio 07/06/2023 1.50   Final    25 Hydroxy, Vitamin D 07/06/2023 24.0 (L)  30.0 - 100.0 ng/ml Final    Vitamin B-12 07/06/2023 511  211 - 946 pg/mL Final    TSH 07/06/2023 0.162 (L)  0.270 - 4.200 uIU/mL Final    Iron 07/06/2023 58  37 - 145 mcg/dL Final    Iron Saturation (TSAT) 07/06/2023 17 (L)  20 - 50 % Final    Transferrin 07/06/2023 230  200 - 360 mg/dL Final    TIBC 07/06/2023 343  298 - 536 mcg/dL Final    Ferritin 07/06/2023 425.00 (H)  13.00 - 150.00 ng/mL Final    Free T4 07/06/2023 1.34  0.93 - 1.70 ng/dL Final      XR Chest PA & Lateral  Narrative: Chest x-ray PA and lateral     CLINICAL INDICATION: Shortness of breath and fever    COMPARISON: None    FINDINGS: Cardiac silhouette is normal in size. Pulmonary  vascularity is unremarkable.     No focal infiltrate or consolidation.  No pleural effusion.  No  pneumothorax.  Impression: No evidence of active disease.    Electronically signed by:  Hermilo Elias MD  7/30/2021 5:27 PM CDT  Workstation: [id number]    @[email or web address]@  Immunization History   Administered Date(s) Administered    Flu Vaccine Quad PF 6-35MO 09/27/2017, 09/27/2017    Flu Vaccine Quad PF >36MO 09/27/2017    Flucelvax Quad Vial =>4yrs 10/31/2019    Fluzone >6mos 09/10/2020, 12/23/2021    Hepatitis A 12/15/2017    Hepatitis B 12/15/2017, 12/15/2017, 01/26/2018, 01/26/2018    Influenza, Unspecified 10/31/2019    Pneumococcal Conjugate 20-Valent (PCV20) 07/06/2023    Pneumococcal Polysaccharide (PPSV23) 12/23/2021       The following portions of the patient's history were reviewed and updated as appropriate: allergies, current medications, past family history, past  "medical history, past social history, past surgical history and problem list.        Physical Exam  /92 (BP Location: Left arm, Patient Position: Sitting, Cuff Size: Large Adult)   Pulse 84   Temp 98.2 °F (36.8 °C)   Ht 172.7 cm (68\")   Wt 103 kg (226 lb 6.4 oz)   SpO2 98%   BMI 34.42 kg/m²     Physical Exam  Vitals and nursing note reviewed.   Constitutional:       Appearance: She is well-developed. She is not diaphoretic.   HENT:      Head: Normocephalic and atraumatic.      Right Ear: External ear normal.   Eyes:      Conjunctiva/sclera: Conjunctivae normal.      Pupils: Pupils are equal, round, and reactive to light.   Cardiovascular:      Rate and Rhythm: Normal rate and regular rhythm.      Heart sounds: Normal heart sounds. No murmur heard.  Pulmonary:      Effort: Pulmonary effort is normal. No respiratory distress.      Breath sounds: Normal breath sounds.   Abdominal:      General: Bowel sounds are normal. There is no distension.      Palpations: Abdomen is soft.      Tenderness: There is no abdominal tenderness.      Comments: Obese abdomen    Musculoskeletal:         General: Tenderness present. No deformity. Normal range of motion.      Cervical back: Normal range of motion and neck supple.   Skin:     General: Skin is warm.      Coloration: Skin is not pale.      Findings: No erythema or rash.   Neurological:      Mental Status: She is alert and oriented to person, place, and time.      Cranial Nerves: No cranial nerve deficit.   Psychiatric:         Behavior: Behavior normal.       Assessment/Plan    Diagnosis Plan   1. Essential hypertension  CBC Auto Differential    Comprehensive Metabolic Panel    Lipid Panel    TSH    T4, Free    Vitamin D,25-Hydroxy    Vitamin B12    Iron Profile    Ferritin      2. History of cardiac arrest  CBC Auto Differential    Comprehensive Metabolic Panel    Lipid Panel    TSH    T4, Free    Vitamin D,25-Hydroxy    Vitamin B12    Iron Profile    Ferritin    "   3. Mixed hyperlipidemia  CBC Auto Differential    Comprehensive Metabolic Panel    Lipid Panel    TSH    T4, Free    Vitamin D,25-Hydroxy    Vitamin B12    Iron Profile    Ferritin      4. Vitamin D deficiency  CBC Auto Differential    Comprehensive Metabolic Panel    Lipid Panel    TSH    T4, Free    Vitamin D,25-Hydroxy    Vitamin B12    Iron Profile    Ferritin      5. Stage 2 chronic kidney disease  CBC Auto Differential    Comprehensive Metabolic Panel    Lipid Panel    TSH    T4, Free    Vitamin D,25-Hydroxy    Vitamin B12    Iron Profile    Ferritin      6. History of CVA (cerebrovascular accident)  CBC Auto Differential    Comprehensive Metabolic Panel    Lipid Panel    TSH    T4, Free    Vitamin D,25-Hydroxy    Vitamin B12    Iron Profile    Ferritin      7. COPD with exacerbation  CBC Auto Differential    Comprehensive Metabolic Panel    Lipid Panel    TSH    T4, Free    Vitamin D,25-Hydroxy    Vitamin B12    Iron Profile    Ferritin      8. Gait instability  CBC Auto Differential    Comprehensive Metabolic Panel    Lipid Panel    TSH    T4, Free    Vitamin D,25-Hydroxy    Vitamin B12    Iron Profile    Ferritin      9. Tobacco user  CBC Auto Differential    Comprehensive Metabolic Panel    Lipid Panel    TSH    T4, Free    Vitamin D,25-Hydroxy    Vitamin B12    Iron Profile    Ferritin      10. Nicotine dependence with nicotine-induced disorder, unspecified nicotine product type  CBC Auto Differential    Comprehensive Metabolic Panel    Lipid Panel    TSH    T4, Free    Vitamin D,25-Hydroxy    Vitamin B12    Iron Profile    Ferritin      11. History of prediabetes        12. Class 1 obesity due to excess calories with serious comorbidity and body mass index (BMI) of 34.0 to 34.9 in adult        13. Abnormal thyroid function test               -went over labowrk   -recommend mammogram - schedule at imaging center   -recommend DEXA scan schedule at imaging center   -recommnend pap smear -refer  to OB/GYN    -CKD stage 2 - continue to monitor   -COPD/COPD exacerbation/pulmonary HTN/ pulmonary edema- Pulmonology following on Stioloto.  -pulmonary nodule/lymph node in lung - adivsed pt to followup with Pulmonologist soon and gt repeat CT of chest in December 2020   -vitamin D deficiency - vitamin D once a week  -hypertension  - on lisionpril-HCTZ 20/25 mg x 2 tablets daily. Restart norvasc 10 mg daily and toprol XL 25 mg daily. Recommend los sodium diet. Stressed importance of BP control  refilled lisinopril-HCTZ 20/25 mg x 2 tablets.   -tobacco user - recommend 1 800 QUIT Now and nicotine patches counseled quit smoking >5 minutes   -chronic back pain/mild degnerative changes of lumbar spine - PM following  Was on cymbalta 60 mg PO q dailyi. Has been on tylenol PRN.  Continue on tramadol 50 mg every 8 hours PRN.  -gait instability/high fall risk - pt uses cane to ambulate   -chronic hep C - continue to monitor   -iron deficiency anemia - pt on iron supplements. Recommend  Possible Hematology referral for possible iron IV therapy .   -GERD - on protonix 40 mg PO q daily.    -history of CVA /gait instability/slurring of speech/history of brain tumor -Neurology following currently stable  On aspirin 81 mg daily, on plavix 72 mg daily.   -hyperlipidemia - stable lipitor 80 mg PO qhs recheck lipid panel DASH diet/heart healthy diet    -constipation/IBS  - stable on linzess 145 mcg daily.    -obesity -  counseled weight loss >5 miinutes  BMI at 36.80   -advised pt to be safe and call with questions and concerns  -advised to go to ER or call 911 if symptoms worrisome or severe  -advised to followup with Specialist and referrals  -advised pt to be safe during COVID-19 pandemic  I spent 34  minutes caring for Nathaly on this date of service. This time includes time spent by me in the following activities: preparing for the visit, reviewing tests, obtaining and/or reviewing a separately obtained history, performing a medically  appropriate examination and/or evaluation, counseling and educating the patient/family/caregiver, ordering medications, tests, or procedures, referring and communicating with other health care professionals, documenting information in the medical record, independently interpreting results and communicating that information with the patient/family/caregiver and care coordination   -recheck in 6 weeks         This document has been electronically signed by Brown Lion MD on September 6, 2023 09:52 CDT

## 2023-09-05 RX ORDER — LISINOPRIL AND HYDROCHLOROTHIAZIDE 25; 20 MG/1; MG/1
2 TABLET ORAL DAILY
Qty: 60 TABLET | Refills: 0 | Status: SHIPPED | OUTPATIENT
Start: 2023-09-05 | End: 2023-09-06 | Stop reason: SDUPTHER

## 2023-09-05 NOTE — TELEPHONE ENCOUNTER
ACE Inhibitors Protocol [id number]/02/2023 12:18 PM   Protocol Details Blood pressure on record

## 2023-09-06 ENCOUNTER — OFFICE VISIT (OUTPATIENT)
Dept: FAMILY MEDICINE CLINIC | Facility: CLINIC | Age: 68
End: 2023-09-06
Payer: MEDICARE

## 2023-09-06 VITALS
HEART RATE: 84 BPM | WEIGHT: 226.4 LBS | TEMPERATURE: 98.2 F | DIASTOLIC BLOOD PRESSURE: 92 MMHG | HEIGHT: 68 IN | BODY MASS INDEX: 34.31 KG/M2 | OXYGEN SATURATION: 98 % | SYSTOLIC BLOOD PRESSURE: 162 MMHG

## 2023-09-06 DIAGNOSIS — J44.1 COPD WITH EXACERBATION: ICD-10-CM

## 2023-09-06 DIAGNOSIS — F17.209 NICOTINE DEPENDENCE WITH NICOTINE-INDUCED DISORDER, UNSPECIFIED NICOTINE PRODUCT TYPE: ICD-10-CM

## 2023-09-06 DIAGNOSIS — I10 ESSENTIAL HYPERTENSION: Primary | ICD-10-CM

## 2023-09-06 DIAGNOSIS — R94.6 ABNORMAL THYROID FUNCTION TEST: ICD-10-CM

## 2023-09-06 DIAGNOSIS — Z86.73 HISTORY OF CVA (CEREBROVASCULAR ACCIDENT): ICD-10-CM

## 2023-09-06 DIAGNOSIS — Z86.74 HISTORY OF CARDIAC ARREST: ICD-10-CM

## 2023-09-06 DIAGNOSIS — R26.81 GAIT INSTABILITY: ICD-10-CM

## 2023-09-06 DIAGNOSIS — E55.9 VITAMIN D DEFICIENCY: ICD-10-CM

## 2023-09-06 DIAGNOSIS — N18.2 STAGE 2 CHRONIC KIDNEY DISEASE: ICD-10-CM

## 2023-09-06 DIAGNOSIS — Z72.0 TOBACCO USER: ICD-10-CM

## 2023-09-06 DIAGNOSIS — E66.09 CLASS 1 OBESITY DUE TO EXCESS CALORIES WITH SERIOUS COMORBIDITY AND BODY MASS INDEX (BMI) OF 34.0 TO 34.9 IN ADULT: ICD-10-CM

## 2023-09-06 DIAGNOSIS — Z87.898 HISTORY OF PREDIABETES: ICD-10-CM

## 2023-09-06 DIAGNOSIS — E78.2 MIXED HYPERLIPIDEMIA: ICD-10-CM

## 2023-09-06 PROCEDURE — 99214 OFFICE O/P EST MOD 30 MIN: CPT | Performed by: FAMILY MEDICINE

## 2023-09-06 PROCEDURE — 3080F DIAST BP >= 90 MM HG: CPT | Performed by: FAMILY MEDICINE

## 2023-09-06 PROCEDURE — 3077F SYST BP >= 140 MM HG: CPT | Performed by: FAMILY MEDICINE

## 2023-09-06 RX ORDER — LISINOPRIL AND HYDROCHLOROTHIAZIDE 25; 20 MG/1; MG/1
2 TABLET ORAL DAILY
Qty: 60 TABLET | Refills: 0 | Status: SHIPPED | OUTPATIENT
Start: 2023-09-06

## 2023-09-06 NOTE — PATIENT INSTRUCTIONS
Get labwork next month fasting    Recheck in 6-8 weeks    Continue on lisinopril-HCTZ  daily    Monitor blood pressure at home goal <130/80  Check in morning and afternoon    Sign up for mychart in case you run of medications.